# Patient Record
Sex: FEMALE | Race: WHITE | Employment: OTHER | URBAN - METROPOLITAN AREA
[De-identification: names, ages, dates, MRNs, and addresses within clinical notes are randomized per-mention and may not be internally consistent; named-entity substitution may affect disease eponyms.]

---

## 2019-09-17 ENCOUNTER — HOSPITAL ENCOUNTER (OUTPATIENT)
Dept: WOUND CARE | Age: 84
Discharge: HOME | End: 2019-09-17
Payer: MEDICARE

## 2019-09-17 VITALS
WEIGHT: 210 LBS | HEART RATE: 60 BPM | BODY MASS INDEX: 48.6 KG/M2 | HEIGHT: 55 IN | DIASTOLIC BLOOD PRESSURE: 68 MMHG | SYSTOLIC BLOOD PRESSURE: 104 MMHG | TEMPERATURE: 97.8 F | RESPIRATION RATE: 18 BRPM

## 2019-09-17 PROCEDURE — 87070 CULTURE OTHR SPECIMN AEROBIC: CPT

## 2019-09-17 PROCEDURE — 87186 SC STD MICRODIL/AGAR DIL: CPT

## 2019-09-17 PROCEDURE — 11042 DBRDMT SUBQ TIS 1ST 20SQCM/<: CPT

## 2019-09-17 PROCEDURE — 11042 DBRDMT SUBQ TIS 1ST 20SQCM/<: CPT | Performed by: FAMILY MEDICINE

## 2019-09-17 PROCEDURE — 99213 OFFICE O/P EST LOW 20 MIN: CPT

## 2019-09-17 PROCEDURE — 99203 OFFICE O/P NEW LOW 30 MIN: CPT | Performed by: FAMILY MEDICINE

## 2019-09-17 PROCEDURE — 87075 CULTR BACTERIA EXCEPT BLOOD: CPT

## 2019-09-17 PROCEDURE — 87077 CULTURE AEROBIC IDENTIFY: CPT

## 2019-09-17 RX ORDER — LIDOCAINE HYDROCHLORIDE 20 MG/ML
JELLY TOPICAL ONCE
Status: DISCONTINUED | OUTPATIENT
Start: 2019-09-17 | End: 2019-09-18 | Stop reason: HOSPADM

## 2019-09-17 RX ORDER — MAGNESIUM OXIDE 400 MG/1
400 TABLET ORAL DAILY
COMMUNITY

## 2019-09-17 RX ORDER — ALLOPURINOL 100 MG/1
100 TABLET ORAL DAILY
COMMUNITY

## 2019-09-17 RX ORDER — TAMSULOSIN HYDROCHLORIDE 0.4 MG/1
0.4 CAPSULE ORAL DAILY
COMMUNITY
End: 2019-10-22

## 2019-09-17 RX ORDER — WARFARIN SODIUM 5 MG/1
5 TABLET ORAL
Status: ON HOLD | COMMUNITY
End: 2022-02-24 | Stop reason: HOSPADM

## 2019-09-17 RX ORDER — WARFARIN SODIUM 2.5 MG/1
2.5 TABLET ORAL
Status: ON HOLD | COMMUNITY
End: 2022-02-24 | Stop reason: HOSPADM

## 2019-09-17 RX ORDER — FERROUS SULFATE 325(65) MG
325 TABLET ORAL
COMMUNITY

## 2019-09-17 RX ORDER — SPIRONOLACTONE 25 MG/1
25 TABLET ORAL 2 TIMES DAILY
Status: ON HOLD | COMMUNITY
End: 2019-10-26 | Stop reason: HOSPADM

## 2019-09-17 NOTE — PROGRESS NOTES
Wound care supplies ordered for patient from [facility]
shown are from today's visit. Procedure: Excisional Debridement: Wound # 1. At 2.55 cm sq. The patient was placed in the supine position. Lidocaine  gauze was applied  at beginning of wound evaluation. An  Excisional Debridement was performed. Using a curette ,  the wound was debrided sharply of all fibrotic, necrotic, and hyperkeratotic tissue, including a layer of surrounding healthy tissue to stimulate epithelization. The wound was excised through the level of the subcutaneous Wound Percentage debrided is 100%   Wound was irrigated with normal saline solution. Bleeding was with a moderate amount of bleeding, and controlled with pressure . Patient tolerated procedure well and was given proper instruction. 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 H&P, General medical evaluation for the purpose of Comprehensive wound    management for maximum healing and minimal morbidity. 2.   Excisional Debridement. Collagen, duoderm  3. We had a lengthy discussion about the diagnosis and aspects  to consider.              Sudhir Wong D.O.                   9/17/2019    1:55 PM

## 2019-09-19 LAB — ANAEROBIC CULTURE: NORMAL

## 2019-09-21 LAB
ORGANISM: ABNORMAL
ORGANISM: ABNORMAL
WOUND/ABSCESS: ABNORMAL
WOUND/ABSCESS: ABNORMAL

## 2019-09-24 ENCOUNTER — HOSPITAL ENCOUNTER (OUTPATIENT)
Dept: WOUND CARE | Age: 84
Discharge: HOME | End: 2019-09-24
Payer: MEDICARE

## 2019-10-01 ENCOUNTER — HOSPITAL ENCOUNTER (OUTPATIENT)
Dept: WOUND CARE | Age: 84
Discharge: HOME | End: 2019-10-01
Payer: MEDICARE

## 2019-10-01 VITALS
WEIGHT: 210 LBS | SYSTOLIC BLOOD PRESSURE: 92 MMHG | HEIGHT: 55 IN | DIASTOLIC BLOOD PRESSURE: 60 MMHG | RESPIRATION RATE: 18 BRPM | HEART RATE: 72 BPM | BODY MASS INDEX: 48.6 KG/M2 | TEMPERATURE: 98.2 F

## 2019-10-01 PROCEDURE — 87147 CULTURE TYPE IMMUNOLOGIC: CPT

## 2019-10-01 PROCEDURE — 87186 SC STD MICRODIL/AGAR DIL: CPT

## 2019-10-01 PROCEDURE — 87077 CULTURE AEROBIC IDENTIFY: CPT

## 2019-10-01 PROCEDURE — 87070 CULTURE OTHR SPECIMN AEROBIC: CPT

## 2019-10-01 PROCEDURE — 11042 DBRDMT SUBQ TIS 1ST 20SQCM/<: CPT

## 2019-10-01 PROCEDURE — 11042 DBRDMT SUBQ TIS 1ST 20SQCM/<: CPT | Performed by: FAMILY MEDICINE

## 2019-10-01 PROCEDURE — 87075 CULTR BACTERIA EXCEPT BLOOD: CPT

## 2019-10-01 RX ORDER — LIDOCAINE HYDROCHLORIDE 20 MG/ML
JELLY TOPICAL ONCE
Status: DISCONTINUED | OUTPATIENT
Start: 2019-10-01 | End: 2019-10-02 | Stop reason: HOSPADM

## 2019-10-03 LAB — ANAEROBIC CULTURE: NORMAL

## 2019-10-04 LAB
ORGANISM: ABNORMAL
WOUND/ABSCESS: ABNORMAL

## 2019-10-08 ENCOUNTER — HOSPITAL ENCOUNTER (OUTPATIENT)
Dept: WOUND CARE | Age: 84
Discharge: HOME | End: 2019-10-08
Payer: MEDICARE

## 2019-10-08 VITALS
RESPIRATION RATE: 18 BRPM | WEIGHT: 210 LBS | HEIGHT: 55 IN | HEART RATE: 76 BPM | BODY MASS INDEX: 48.6 KG/M2 | DIASTOLIC BLOOD PRESSURE: 70 MMHG | SYSTOLIC BLOOD PRESSURE: 92 MMHG | TEMPERATURE: 97.7 F

## 2019-10-08 DIAGNOSIS — L97.112 LOWER LIMB ULCER, THIGH, RIGHT, WITH FAT LAYER EXPOSED (HCC): Primary | ICD-10-CM

## 2019-10-08 PROCEDURE — 11042 DBRDMT SUBQ TIS 1ST 20SQCM/<: CPT

## 2019-10-08 PROCEDURE — 11042 DBRDMT SUBQ TIS 1ST 20SQCM/<: CPT | Performed by: FAMILY MEDICINE

## 2019-10-08 RX ORDER — GENTAMICIN SULFATE 1 MG/G
OINTMENT TOPICAL DAILY
COMMUNITY
End: 2020-02-24 | Stop reason: ALTCHOICE

## 2019-10-08 RX ORDER — LIDOCAINE HYDROCHLORIDE 20 MG/ML
JELLY TOPICAL ONCE
Status: DISCONTINUED | OUTPATIENT
Start: 2019-10-08 | End: 2019-10-09 | Stop reason: HOSPADM

## 2019-10-08 RX ORDER — GENTAMICIN SULFATE 1 MG/G
OINTMENT TOPICAL
Qty: 30 G | Refills: 1 | Status: SHIPPED | OUTPATIENT
Start: 2019-10-08 | End: 2019-10-15

## 2019-10-08 RX ORDER — SULFAMETHOXAZOLE AND TRIMETHOPRIM 800; 160 MG/1; MG/1
1 TABLET ORAL 2 TIMES DAILY
COMMUNITY
End: 2019-10-15 | Stop reason: ALTCHOICE

## 2019-10-08 RX ORDER — SULFAMETHOXAZOLE AND TRIMETHOPRIM 800; 160 MG/1; MG/1
1 TABLET ORAL 2 TIMES DAILY
Qty: 14 TABLET | Refills: 1 | Status: SHIPPED | OUTPATIENT
Start: 2019-10-08 | End: 2019-10-15 | Stop reason: ALTCHOICE

## 2019-10-08 ASSESSMENT — PAIN DESCRIPTION - ONSET: ONSET: ON-GOING

## 2019-10-08 ASSESSMENT — PAIN DESCRIPTION - LOCATION: LOCATION: LEG

## 2019-10-08 ASSESSMENT — PAIN DESCRIPTION - DESCRIPTORS: DESCRIPTORS: BURNING

## 2019-10-08 ASSESSMENT — PAIN DESCRIPTION - PAIN TYPE: TYPE: ACUTE PAIN

## 2019-10-08 ASSESSMENT — PAIN - FUNCTIONAL ASSESSMENT: PAIN_FUNCTIONAL_ASSESSMENT: PREVENTS OR INTERFERES SOME ACTIVE ACTIVITIES AND ADLS

## 2019-10-08 ASSESSMENT — PAIN DESCRIPTION - FREQUENCY: FREQUENCY: INTERMITTENT

## 2019-10-08 ASSESSMENT — PAIN DESCRIPTION - ORIENTATION: ORIENTATION: RIGHT

## 2019-10-08 ASSESSMENT — PAIN SCALES - GENERAL: PAINLEVEL_OUTOF10: 2

## 2019-10-15 ENCOUNTER — HOSPITAL ENCOUNTER (OUTPATIENT)
Dept: WOUND CARE | Age: 84
Discharge: HOME | End: 2019-10-15
Payer: MEDICARE

## 2019-10-15 VITALS
BODY MASS INDEX: 55.15 KG/M2 | TEMPERATURE: 97.8 F | DIASTOLIC BLOOD PRESSURE: 62 MMHG | HEART RATE: 88 BPM | RESPIRATION RATE: 20 BRPM | SYSTOLIC BLOOD PRESSURE: 104 MMHG | WEIGHT: 233 LBS

## 2019-10-15 PROCEDURE — 11042 DBRDMT SUBQ TIS 1ST 20SQCM/<: CPT | Performed by: FAMILY MEDICINE

## 2019-10-15 PROCEDURE — 11042 DBRDMT SUBQ TIS 1ST 20SQCM/<: CPT

## 2019-10-15 RX ORDER — LIDOCAINE HYDROCHLORIDE 20 MG/ML
JELLY TOPICAL ONCE
Status: DISCONTINUED | OUTPATIENT
Start: 2019-10-15 | End: 2019-10-16 | Stop reason: HOSPADM

## 2019-10-15 ASSESSMENT — PAIN SCALES - GENERAL: PAINLEVEL_OUTOF10: 0

## 2019-10-22 ENCOUNTER — HOSPITAL ENCOUNTER (OUTPATIENT)
Dept: WOUND CARE | Age: 84
Discharge: HOME | DRG: 623 | End: 2019-10-22
Payer: MEDICARE

## 2019-10-22 ENCOUNTER — HOSPITAL ENCOUNTER (INPATIENT)
Age: 84
LOS: 5 days | Discharge: HOME | DRG: 623 | End: 2019-10-27
Attending: EMERGENCY MEDICINE | Admitting: INTERNAL MEDICINE
Payer: MEDICARE

## 2019-10-22 VITALS
SYSTOLIC BLOOD PRESSURE: 112 MMHG | WEIGHT: 227 LBS | TEMPERATURE: 97.9 F | BODY MASS INDEX: 53.73 KG/M2 | DIASTOLIC BLOOD PRESSURE: 62 MMHG | HEART RATE: 64 BPM | RESPIRATION RATE: 20 BRPM

## 2019-10-22 DIAGNOSIS — L97.112 SKIN ULCER OF RIGHT THIGH WITH FAT LAYER EXPOSED (HCC): Primary | ICD-10-CM

## 2019-10-22 DIAGNOSIS — N17.9 AKI (ACUTE KIDNEY INJURY) (HCC): ICD-10-CM

## 2019-10-22 DIAGNOSIS — E87.5 HYPERKALEMIA: Primary | ICD-10-CM

## 2019-10-22 LAB
ALBUMIN SERPL-MCNC: 4.1 G/DL (ref 3.5–5.2)
ALP BLD-CCNC: 39 U/L (ref 35–104)
ALT SERPL-CCNC: 13 U/L (ref 0–32)
ANION GAP SERPL CALCULATED.3IONS-SCNC: 10 MMOL/L (ref 7–16)
ANION GAP SERPL CALCULATED.3IONS-SCNC: 9 MMOL/L (ref 7–16)
AST SERPL-CCNC: 31 U/L (ref 0–31)
BASOPHILS ABSOLUTE: 0.08 E9/L (ref 0–0.2)
BASOPHILS RELATIVE PERCENT: 1.2 % (ref 0–2)
BILIRUB SERPL-MCNC: 0.4 MG/DL (ref 0–1.2)
BUN BLDV-MCNC: 41 MG/DL (ref 8–23)
BUN BLDV-MCNC: 41 MG/DL (ref 8–23)
CALCIUM SERPL-MCNC: 9.3 MG/DL (ref 8.6–10.2)
CALCIUM SERPL-MCNC: 9.7 MG/DL (ref 8.6–10.2)
CHLORIDE BLD-SCNC: 114 MMOL/L (ref 98–107)
CHLORIDE BLD-SCNC: 114 MMOL/L (ref 98–107)
CO2: 11 MMOL/L (ref 22–29)
CO2: 13 MMOL/L (ref 22–29)
CO2: 18 MMOL/L (ref 22–29)
CO2: 19 MMOL/L (ref 22–29)
CREAT SERPL-MCNC: 1.5 MG/DL (ref 0.5–1)
CREAT SERPL-MCNC: 1.6 MG/DL (ref 0.5–1)
EOSINOPHILS ABSOLUTE: 0.18 E9/L (ref 0.05–0.5)
EOSINOPHILS RELATIVE PERCENT: 2.7 % (ref 0–6)
GFR AFRICAN AMERICAN: 35
GFR AFRICAN AMERICAN: 37
GFR AFRICAN AMERICAN: 37
GFR AFRICAN AMERICAN: 40
GFR NON-AFRICAN AMERICAN: 29 ML/MIN/1.73
GFR NON-AFRICAN AMERICAN: 31 ML/MIN/1.73
GFR NON-AFRICAN AMERICAN: 31 ML/MIN/1.73
GFR NON-AFRICAN AMERICAN: 33 ML/MIN/1.73
GLUCOSE BLD-MCNC: 75 MG/DL (ref 74–99)
GLUCOSE BLD-MCNC: 79 MG/DL (ref 74–99)
GLUCOSE BLD-MCNC: 79 MG/DL (ref 74–99)
GLUCOSE BLD-MCNC: 96 MG/DL (ref 74–99)
HCT VFR BLD CALC: 36.9 % (ref 34–48)
HEMOGLOBIN: 11.1 G/DL (ref 11.5–15.5)
IMMATURE GRANULOCYTES #: 0.02 E9/L
IMMATURE GRANULOCYTES %: 0.3 % (ref 0–5)
INR BLD: 1.4
LYMPHOCYTES ABSOLUTE: 1.46 E9/L (ref 1.5–4)
LYMPHOCYTES RELATIVE PERCENT: 22.1 % (ref 20–42)
MCH RBC QN AUTO: 31.3 PG (ref 26–35)
MCHC RBC AUTO-ENTMCNC: 30.1 % (ref 32–34.5)
MCV RBC AUTO: 103.9 FL (ref 80–99.9)
MONOCYTES ABSOLUTE: 0.44 E9/L (ref 0.1–0.95)
MONOCYTES RELATIVE PERCENT: 6.6 % (ref 2–12)
NEUTROPHILS ABSOLUTE: 4.44 E9/L (ref 1.8–7.3)
NEUTROPHILS RELATIVE PERCENT: 67.1 % (ref 43–80)
PDW BLD-RTO: 15.7 FL (ref 11.5–15)
PLATELET # BLD: 255 E9/L (ref 130–450)
PMV BLD AUTO: 10.5 FL (ref 7–12)
POC ANION GAP: 1 MMOL/L (ref 7–16)
POC ANION GAP: 7 MMOL/L (ref 7–16)
POC BUN: 52 MG/DL (ref 8–23)
POC BUN: 62 MG/DL (ref 8–23)
POC CHLORIDE: 115 MMOL/L (ref 100–108)
POC CHLORIDE: 118 MMOL/L (ref 100–108)
POC CREATININE: 1.6 MG/DL (ref 0.5–1)
POC CREATININE: 1.7 MG/DL (ref 0.5–1)
POC POTASSIUM: 6.3 MMOL/L (ref 3.5–5)
POC POTASSIUM: 8.9 MMOL/L (ref 3.5–5)
POC SODIUM: 137 MMOL/L (ref 132–146)
POC SODIUM: 141 MMOL/L (ref 132–146)
POTASSIUM REFLEX MAGNESIUM: 7.9 MMOL/L (ref 3.5–5)
POTASSIUM SERPL-SCNC: 7.3 MMOL/L (ref 3.5–5)
PROTHROMBIN TIME: 15.2 SEC (ref 9.3–12.4)
RBC # BLD: 3.55 E12/L (ref 3.5–5.5)
SODIUM BLD-SCNC: 134 MMOL/L (ref 132–146)
SODIUM BLD-SCNC: 137 MMOL/L (ref 132–146)
TOTAL PROTEIN: 7.6 G/DL (ref 6.4–8.3)
WBC # BLD: 6.6 E9/L (ref 4.5–11.5)

## 2019-10-22 PROCEDURE — 2580000003 HC RX 258: Performed by: INTERNAL MEDICINE

## 2019-10-22 PROCEDURE — 2580000003 HC RX 258: Performed by: EMERGENCY MEDICINE

## 2019-10-22 PROCEDURE — 82565 ASSAY OF CREATININE: CPT

## 2019-10-22 PROCEDURE — 11042 DBRDMT SUBQ TIS 1ST 20SQCM/<: CPT | Performed by: FAMILY MEDICINE

## 2019-10-22 PROCEDURE — 2060000000 HC ICU INTERMEDIATE R&B

## 2019-10-22 PROCEDURE — 96374 THER/PROPH/DIAG INJ IV PUSH: CPT

## 2019-10-22 PROCEDURE — 96375 TX/PRO/DX INJ NEW DRUG ADDON: CPT

## 2019-10-22 PROCEDURE — 6360000002 HC RX W HCPCS: Performed by: EMERGENCY MEDICINE

## 2019-10-22 PROCEDURE — 11042 DBRDMT SUBQ TIS 1ST 20SQCM/<: CPT

## 2019-10-22 PROCEDURE — 2500000003 HC RX 250 WO HCPCS: Performed by: EMERGENCY MEDICINE

## 2019-10-22 PROCEDURE — 82947 ASSAY GLUCOSE BLOOD QUANT: CPT

## 2019-10-22 PROCEDURE — 36415 COLL VENOUS BLD VENIPUNCTURE: CPT

## 2019-10-22 PROCEDURE — 99285 EMERGENCY DEPT VISIT HI MDM: CPT

## 2019-10-22 PROCEDURE — 80051 ELECTROLYTE PANEL: CPT

## 2019-10-22 PROCEDURE — 80048 BASIC METABOLIC PNL TOTAL CA: CPT

## 2019-10-22 PROCEDURE — 93005 ELECTROCARDIOGRAM TRACING: CPT | Performed by: EMERGENCY MEDICINE

## 2019-10-22 PROCEDURE — 85610 PROTHROMBIN TIME: CPT

## 2019-10-22 PROCEDURE — 84520 ASSAY OF UREA NITROGEN: CPT

## 2019-10-22 PROCEDURE — 6370000000 HC RX 637 (ALT 250 FOR IP): Performed by: INTERNAL MEDICINE

## 2019-10-22 PROCEDURE — 85025 COMPLETE CBC W/AUTO DIFF WBC: CPT

## 2019-10-22 PROCEDURE — 6370000000 HC RX 637 (ALT 250 FOR IP): Performed by: EMERGENCY MEDICINE

## 2019-10-22 PROCEDURE — 80053 COMPREHEN METABOLIC PANEL: CPT

## 2019-10-22 RX ORDER — SULFAMETHOXAZOLE AND TRIMETHOPRIM 800; 160 MG/1; MG/1
1 TABLET ORAL 2 TIMES DAILY
Qty: 20 TABLET | Refills: 0 | Status: ON HOLD | OUTPATIENT
Start: 2019-10-22 | End: 2019-10-26 | Stop reason: HOSPADM

## 2019-10-22 RX ORDER — SODIUM CHLORIDE 9 MG/ML
INJECTION, SOLUTION INTRAVENOUS CONTINUOUS
Status: DISCONTINUED | OUTPATIENT
Start: 2019-10-22 | End: 2019-10-23

## 2019-10-22 RX ORDER — LIDOCAINE HYDROCHLORIDE 20 MG/ML
JELLY TOPICAL ONCE
Status: DISCONTINUED | OUTPATIENT
Start: 2019-10-22 | End: 2019-10-23 | Stop reason: HOSPADM

## 2019-10-22 RX ORDER — DEXTROSE MONOHYDRATE 25 G/50ML
25 INJECTION, SOLUTION INTRAVENOUS ONCE
Status: COMPLETED | OUTPATIENT
Start: 2019-10-22 | End: 2019-10-22

## 2019-10-22 RX ORDER — DEXTROSE MONOHYDRATE 50 MG/ML
100 INJECTION, SOLUTION INTRAVENOUS PRN
Status: DISCONTINUED | OUTPATIENT
Start: 2019-10-22 | End: 2019-10-27 | Stop reason: HOSPADM

## 2019-10-22 RX ORDER — SULFAMETHOXAZOLE AND TRIMETHOPRIM 800; 160 MG/1; MG/1
1 TABLET ORAL 2 TIMES DAILY
Status: ON HOLD | COMMUNITY
End: 2019-10-26 | Stop reason: HOSPADM

## 2019-10-22 RX ORDER — ACETAMINOPHEN 500 MG
1000 TABLET ORAL ONCE
Status: COMPLETED | OUTPATIENT
Start: 2019-10-22 | End: 2019-10-22

## 2019-10-22 RX ORDER — LISINOPRIL 10 MG/1
10 TABLET ORAL DAILY
Status: ON HOLD | COMMUNITY
End: 2019-10-26 | Stop reason: HOSPADM

## 2019-10-22 RX ORDER — SODIUM CHLORIDE 0.9 % (FLUSH) 0.9 %
10 SYRINGE (ML) INJECTION PRN
Status: DISCONTINUED | OUTPATIENT
Start: 2019-10-22 | End: 2019-10-27 | Stop reason: HOSPADM

## 2019-10-22 RX ORDER — WARFARIN SODIUM 5 MG/1
5 TABLET ORAL
Status: DISCONTINUED | OUTPATIENT
Start: 2019-10-26 | End: 2019-10-23

## 2019-10-22 RX ORDER — FERROUS SULFATE 325(65) MG
325 TABLET ORAL
Status: DISCONTINUED | OUTPATIENT
Start: 2019-10-23 | End: 2019-10-27 | Stop reason: HOSPADM

## 2019-10-22 RX ORDER — DEXTROSE MONOHYDRATE 25 G/50ML
12.5 INJECTION, SOLUTION INTRAVENOUS PRN
Status: DISCONTINUED | OUTPATIENT
Start: 2019-10-22 | End: 2019-10-27 | Stop reason: HOSPADM

## 2019-10-22 RX ORDER — SODIUM CHLORIDE 0.9 % (FLUSH) 0.9 %
10 SYRINGE (ML) INJECTION EVERY 12 HOURS SCHEDULED
Status: DISCONTINUED | OUTPATIENT
Start: 2019-10-22 | End: 2019-10-27 | Stop reason: HOSPADM

## 2019-10-22 RX ORDER — WARFARIN SODIUM 2.5 MG/1
2.5 TABLET ORAL
Status: DISCONTINUED | OUTPATIENT
Start: 2019-10-22 | End: 2019-10-23

## 2019-10-22 RX ORDER — FUROSEMIDE 10 MG/ML
40 INJECTION INTRAMUSCULAR; INTRAVENOUS ONCE
Status: COMPLETED | OUTPATIENT
Start: 2019-10-22 | End: 2019-10-22

## 2019-10-22 RX ORDER — 0.9 % SODIUM CHLORIDE 0.9 %
1000 INTRAVENOUS SOLUTION INTRAVENOUS ONCE
Status: COMPLETED | OUTPATIENT
Start: 2019-10-22 | End: 2019-10-22

## 2019-10-22 RX ORDER — GENTAMICIN SULFATE 1 MG/G
OINTMENT TOPICAL DAILY
Status: DISCONTINUED | OUTPATIENT
Start: 2019-10-23 | End: 2019-10-27 | Stop reason: HOSPADM

## 2019-10-22 RX ORDER — PANTOPRAZOLE SODIUM 40 MG/1
40 TABLET, DELAYED RELEASE ORAL
Status: DISCONTINUED | OUTPATIENT
Start: 2019-10-23 | End: 2019-10-27 | Stop reason: HOSPADM

## 2019-10-22 RX ORDER — NICOTINE POLACRILEX 4 MG
15 LOZENGE BUCCAL PRN
Status: DISCONTINUED | OUTPATIENT
Start: 2019-10-22 | End: 2019-10-27 | Stop reason: HOSPADM

## 2019-10-22 RX ORDER — ACETAMINOPHEN 325 MG/1
650 TABLET ORAL EVERY 4 HOURS PRN
Status: DISCONTINUED | OUTPATIENT
Start: 2019-10-22 | End: 2019-10-27 | Stop reason: HOSPADM

## 2019-10-22 RX ORDER — SODIUM POLYSTYRENE SULFONATE 15 G/60ML
30 SUSPENSION ORAL; RECTAL ONCE
Status: COMPLETED | OUTPATIENT
Start: 2019-10-22 | End: 2019-10-22

## 2019-10-22 RX ADMIN — Medication 10 ML: at 22:35

## 2019-10-22 RX ADMIN — ACETAMINOPHEN 1000 MG: 500 TABLET, FILM COATED ORAL at 18:53

## 2019-10-22 RX ADMIN — WARFARIN SODIUM 2.5 MG: 2.5 TABLET ORAL at 23:23

## 2019-10-22 RX ADMIN — INSULIN HUMAN 10 UNITS: 100 INJECTION, SOLUTION PARENTERAL at 20:19

## 2019-10-22 RX ADMIN — SODIUM POLYSTYRENE SULFONATE 30 G: 15 SUSPENSION ORAL; RECTAL at 19:54

## 2019-10-22 RX ADMIN — SODIUM BICARBONATE 50 MEQ: 84 INJECTION INTRAVENOUS at 20:26

## 2019-10-22 RX ADMIN — FUROSEMIDE 40 MG: 10 INJECTION, SOLUTION INTRAMUSCULAR; INTRAVENOUS at 20:21

## 2019-10-22 RX ADMIN — SODIUM CHLORIDE 1000 ML: 9 INJECTION, SOLUTION INTRAVENOUS at 20:38

## 2019-10-22 RX ADMIN — CALCIUM GLUCONATE 1 G: 98 INJECTION, SOLUTION INTRAVENOUS at 20:32

## 2019-10-22 RX ADMIN — DEXTROSE MONOHYDRATE 25 G: 500 INJECTION PARENTERAL at 20:13

## 2019-10-22 RX ADMIN — SODIUM CHLORIDE: 9 INJECTION, SOLUTION INTRAVENOUS at 22:34

## 2019-10-22 ASSESSMENT — ENCOUNTER SYMPTOMS
DIARRHEA: 1
VOMITING: 0
NAUSEA: 0
CHEST TIGHTNESS: 0
SHORTNESS OF BREATH: 0
ABDOMINAL PAIN: 0
BACK PAIN: 0

## 2019-10-22 ASSESSMENT — PAIN DESCRIPTION - PAIN TYPE
TYPE: CHRONIC PAIN
TYPE: ACUTE PAIN

## 2019-10-22 ASSESSMENT — PAIN DESCRIPTION - LOCATION
LOCATION: LEG
LOCATION: LEG

## 2019-10-22 ASSESSMENT — PAIN DESCRIPTION - ORIENTATION
ORIENTATION: RIGHT
ORIENTATION: RIGHT

## 2019-10-22 ASSESSMENT — PAIN DESCRIPTION - FREQUENCY: FREQUENCY: INTERMITTENT

## 2019-10-22 ASSESSMENT — PAIN SCALES - GENERAL
PAINLEVEL_OUTOF10: 8
PAINLEVEL_OUTOF10: 10
PAINLEVEL_OUTOF10: 4

## 2019-10-22 ASSESSMENT — PAIN DESCRIPTION - ONSET: ONSET: ON-GOING

## 2019-10-22 ASSESSMENT — PAIN DESCRIPTION - DESCRIPTORS
DESCRIPTORS: BURNING
DESCRIPTORS: SHARP

## 2019-10-23 ENCOUNTER — APPOINTMENT (OUTPATIENT)
Dept: ULTRASOUND IMAGING | Age: 84
DRG: 623 | End: 2019-10-23
Payer: MEDICARE

## 2019-10-23 LAB
ANION GAP SERPL CALCULATED.3IONS-SCNC: 10 MMOL/L (ref 7–16)
ANION GAP SERPL CALCULATED.3IONS-SCNC: 10 MMOL/L (ref 7–16)
ANION GAP SERPL CALCULATED.3IONS-SCNC: 12 MMOL/L (ref 7–16)
BACTERIA: NORMAL /HPF
BILIRUBIN URINE: NEGATIVE
BLOOD, URINE: NEGATIVE
BUN BLDV-MCNC: 40 MG/DL (ref 8–23)
BUN BLDV-MCNC: 40 MG/DL (ref 8–23)
BUN BLDV-MCNC: 41 MG/DL (ref 8–23)
CALCIUM SERPL-MCNC: 9.2 MG/DL (ref 8.6–10.2)
CALCIUM SERPL-MCNC: 9.5 MG/DL (ref 8.6–10.2)
CALCIUM SERPL-MCNC: 9.5 MG/DL (ref 8.6–10.2)
CHLORIDE BLD-SCNC: 110 MMOL/L (ref 98–107)
CHLORIDE BLD-SCNC: 113 MMOL/L (ref 98–107)
CHLORIDE BLD-SCNC: 115 MMOL/L (ref 98–107)
CLARITY: CLEAR
CO2: 13 MMOL/L (ref 22–29)
CO2: 14 MMOL/L (ref 22–29)
CO2: 15 MMOL/L (ref 22–29)
COLOR: YELLOW
CREAT SERPL-MCNC: 1.4 MG/DL (ref 0.5–1)
CREAT SERPL-MCNC: 1.5 MG/DL (ref 0.5–1)
CREAT SERPL-MCNC: 1.5 MG/DL (ref 0.5–1)
EKG ATRIAL RATE: 94 BPM
EKG Q-T INTERVAL: 350 MS
EKG QRS DURATION: 90 MS
EKG QTC CALCULATION (BAZETT): 411 MS
EKG R AXIS: 28 DEGREES
EKG T AXIS: 12 DEGREES
EKG VENTRICULAR RATE: 83 BPM
FOLATE: 6.8 NG/ML (ref 4.8–24.2)
GFR AFRICAN AMERICAN: 40
GFR AFRICAN AMERICAN: 40
GFR AFRICAN AMERICAN: 43
GFR NON-AFRICAN AMERICAN: 33 ML/MIN/1.73
GFR NON-AFRICAN AMERICAN: 33 ML/MIN/1.73
GFR NON-AFRICAN AMERICAN: 36 ML/MIN/1.73
GLUCOSE BLD-MCNC: 80 MG/DL (ref 74–99)
GLUCOSE BLD-MCNC: 81 MG/DL (ref 74–99)
GLUCOSE BLD-MCNC: 92 MG/DL (ref 74–99)
GLUCOSE URINE: NEGATIVE MG/DL
HBA1C MFR BLD: 5.1 % (ref 4–5.6)
HCT VFR BLD CALC: 34.2 % (ref 34–48)
HEMOGLOBIN: 10.2 G/DL (ref 11.5–15.5)
INR BLD: 1.3
KETONES, URINE: NEGATIVE MG/DL
LACTIC ACID: 0.9 MMOL/L (ref 0.5–2.2)
LEUKOCYTE ESTERASE, URINE: NEGATIVE
MAGNESIUM: 1.6 MG/DL (ref 1.6–2.6)
MCH RBC QN AUTO: 30.9 PG (ref 26–35)
MCHC RBC AUTO-ENTMCNC: 29.8 % (ref 32–34.5)
MCV RBC AUTO: 103.6 FL (ref 80–99.9)
NITRITE, URINE: NEGATIVE
PDW BLD-RTO: 15.9 FL (ref 11.5–15)
PH UA: 5 (ref 5–9)
PHOSPHORUS: 3.4 MG/DL (ref 2.5–4.5)
PLATELET # BLD: 260 E9/L (ref 130–450)
PMV BLD AUTO: 10.6 FL (ref 7–12)
POTASSIUM SERPL-SCNC: 5.6 MMOL/L (ref 3.5–5)
POTASSIUM SERPL-SCNC: 5.9 MMOL/L (ref 3.5–5)
POTASSIUM SERPL-SCNC: 6.2 MMOL/L (ref 3.5–5)
PROTEIN UA: NEGATIVE MG/DL
PROTHROMBIN TIME: 15 SEC (ref 9.3–12.4)
RBC # BLD: 3.3 E12/L (ref 3.5–5.5)
RBC UA: NORMAL /HPF (ref 0–2)
SODIUM BLD-SCNC: 137 MMOL/L (ref 132–146)
SODIUM BLD-SCNC: 137 MMOL/L (ref 132–146)
SODIUM BLD-SCNC: 138 MMOL/L (ref 132–146)
SPECIFIC GRAVITY UA: 1.01 (ref 1–1.03)
TSH SERPL DL<=0.05 MIU/L-ACNC: 1.96 UIU/ML (ref 0.27–4.2)
UROBILINOGEN, URINE: 0.2 E.U./DL
VITAMIN B-12: 450 PG/ML (ref 211–946)
WBC # BLD: 6 E9/L (ref 4.5–11.5)
WBC UA: NORMAL /HPF (ref 0–5)

## 2019-10-23 PROCEDURE — 6370000000 HC RX 637 (ALT 250 FOR IP): Performed by: INTERNAL MEDICINE

## 2019-10-23 PROCEDURE — 83735 ASSAY OF MAGNESIUM: CPT

## 2019-10-23 PROCEDURE — 99222 1ST HOSP IP/OBS MODERATE 55: CPT | Performed by: SURGERY

## 2019-10-23 PROCEDURE — 83036 HEMOGLOBIN GLYCOSYLATED A1C: CPT

## 2019-10-23 PROCEDURE — 80048 BASIC METABOLIC PNL TOTAL CA: CPT

## 2019-10-23 PROCEDURE — 2580000003 HC RX 258: Performed by: INTERNAL MEDICINE

## 2019-10-23 PROCEDURE — 85027 COMPLETE CBC AUTOMATED: CPT

## 2019-10-23 PROCEDURE — 81001 URINALYSIS AUTO W/SCOPE: CPT

## 2019-10-23 PROCEDURE — 2500000003 HC RX 250 WO HCPCS: Performed by: INTERNAL MEDICINE

## 2019-10-23 PROCEDURE — 36415 COLL VENOUS BLD VENIPUNCTURE: CPT

## 2019-10-23 PROCEDURE — 84100 ASSAY OF PHOSPHORUS: CPT

## 2019-10-23 PROCEDURE — 84443 ASSAY THYROID STIM HORMONE: CPT

## 2019-10-23 PROCEDURE — 82746 ASSAY OF FOLIC ACID SERUM: CPT

## 2019-10-23 PROCEDURE — 2580000003 HC RX 258: Performed by: CLINICAL NURSE SPECIALIST

## 2019-10-23 PROCEDURE — 85610 PROTHROMBIN TIME: CPT

## 2019-10-23 PROCEDURE — 87070 CULTURE OTHR SPECIMN AEROBIC: CPT

## 2019-10-23 PROCEDURE — 93010 ELECTROCARDIOGRAM REPORT: CPT | Performed by: INTERNAL MEDICINE

## 2019-10-23 PROCEDURE — 2060000000 HC ICU INTERMEDIATE R&B

## 2019-10-23 PROCEDURE — 6360000002 HC RX W HCPCS: Performed by: CLINICAL NURSE SPECIALIST

## 2019-10-23 PROCEDURE — 82607 VITAMIN B-12: CPT

## 2019-10-23 PROCEDURE — 83605 ASSAY OF LACTIC ACID: CPT

## 2019-10-23 PROCEDURE — 76882 US LMTD JT/FCL EVL NVASC XTR: CPT

## 2019-10-23 RX ORDER — LEVOFLOXACIN 750 MG/1
750 TABLET ORAL EVERY OTHER DAY
Status: DISCONTINUED | OUTPATIENT
Start: 2019-10-23 | End: 2019-10-27 | Stop reason: HOSPADM

## 2019-10-23 RX ORDER — LEVOFLOXACIN 750 MG/1
TABLET ORAL
Status: DISPENSED
Start: 2019-10-23 | End: 2019-10-23

## 2019-10-23 RX ADMIN — GENTAMICIN SULFATE: 1 OINTMENT TOPICAL at 10:15

## 2019-10-23 RX ADMIN — FERROUS SULFATE TAB 325 MG (65 MG ELEMENTAL FE) 325 MG: 325 (65 FE) TAB at 11:03

## 2019-10-23 RX ADMIN — ACETAMINOPHEN 650 MG: 325 TABLET, FILM COATED ORAL at 08:36

## 2019-10-23 RX ADMIN — ACETAMINOPHEN 650 MG: 325 TABLET, FILM COATED ORAL at 23:16

## 2019-10-23 RX ADMIN — PANTOPRAZOLE SODIUM 40 MG: 40 TABLET, DELAYED RELEASE ORAL at 06:06

## 2019-10-23 RX ADMIN — LEVOFLOXACIN 750 MG: 750 TABLET, FILM COATED ORAL at 08:35

## 2019-10-23 RX ADMIN — SODIUM BICARBONATE: 84 INJECTION, SOLUTION INTRAVENOUS at 03:27

## 2019-10-23 RX ADMIN — METOPROLOL TARTRATE 25 MG: 25 TABLET ORAL at 20:28

## 2019-10-23 RX ADMIN — METOPROLOL TARTRATE 25 MG: 25 TABLET ORAL at 08:35

## 2019-10-23 RX ADMIN — VANCOMYCIN HYDROCHLORIDE 1250 MG: 10 INJECTION, POWDER, LYOPHILIZED, FOR SOLUTION INTRAVENOUS at 15:28

## 2019-10-23 ASSESSMENT — PAIN SCALES - GENERAL
PAINLEVEL_OUTOF10: 4
PAINLEVEL_OUTOF10: 8
PAINLEVEL_OUTOF10: 0
PAINLEVEL_OUTOF10: 0
PAINLEVEL_OUTOF10: 3
PAINLEVEL_OUTOF10: 2

## 2019-10-23 ASSESSMENT — PAIN DESCRIPTION - PROGRESSION: CLINICAL_PROGRESSION: GRADUALLY WORSENING

## 2019-10-23 ASSESSMENT — PAIN DESCRIPTION - PAIN TYPE
TYPE: ACUTE PAIN
TYPE: CHRONIC PAIN

## 2019-10-23 ASSESSMENT — PAIN DESCRIPTION - DESCRIPTORS: DESCRIPTORS: ACHING;DISCOMFORT;DULL

## 2019-10-23 ASSESSMENT — PAIN DESCRIPTION - ONSET: ONSET: ON-GOING

## 2019-10-23 ASSESSMENT — PAIN SCALES - WONG BAKER
WONGBAKER_NUMERICALRESPONSE: 0
WONGBAKER_NUMERICALRESPONSE: 0

## 2019-10-23 ASSESSMENT — PAIN - FUNCTIONAL ASSESSMENT: PAIN_FUNCTIONAL_ASSESSMENT: ACTIVITIES ARE NOT PREVENTED

## 2019-10-23 ASSESSMENT — PAIN DESCRIPTION - LOCATION
LOCATION: HEAD
LOCATION: GENERALIZED

## 2019-10-23 ASSESSMENT — PAIN DESCRIPTION - FREQUENCY: FREQUENCY: CONTINUOUS

## 2019-10-23 ASSESSMENT — PAIN DESCRIPTION - ORIENTATION: ORIENTATION: RIGHT;LEFT

## 2019-10-24 ENCOUNTER — ANESTHESIA (OUTPATIENT)
Dept: OPERATING ROOM | Age: 84
DRG: 623 | End: 2019-10-24
Payer: MEDICARE

## 2019-10-24 ENCOUNTER — ANESTHESIA EVENT (OUTPATIENT)
Dept: OPERATING ROOM | Age: 84
DRG: 623 | End: 2019-10-24
Payer: MEDICARE

## 2019-10-24 VITALS
RESPIRATION RATE: 15 BRPM | SYSTOLIC BLOOD PRESSURE: 80 MMHG | DIASTOLIC BLOOD PRESSURE: 51 MMHG | OXYGEN SATURATION: 98 %

## 2019-10-24 LAB
ANION GAP SERPL CALCULATED.3IONS-SCNC: 12 MMOL/L (ref 7–16)
BUN BLDV-MCNC: 36 MG/DL (ref 8–23)
CALCIUM SERPL-MCNC: 9.3 MG/DL (ref 8.6–10.2)
CHLORIDE BLD-SCNC: 107 MMOL/L (ref 98–107)
CHLORIDE URINE RANDOM: 62 MMOL/L
CO2: 17 MMOL/L (ref 22–29)
CREAT SERPL-MCNC: 1.5 MG/DL (ref 0.5–1)
CREATININE URINE: 173 MG/DL (ref 29–226)
GFR AFRICAN AMERICAN: 40
GFR NON-AFRICAN AMERICAN: 33 ML/MIN/1.73
GLUCOSE BLD-MCNC: 85 MG/DL (ref 74–99)
HCT VFR BLD CALC: 34.5 % (ref 34–48)
HEMOGLOBIN: 10.5 G/DL (ref 11.5–15.5)
INR BLD: 1.5
MCH RBC QN AUTO: 31.3 PG (ref 26–35)
MCHC RBC AUTO-ENTMCNC: 30.4 % (ref 32–34.5)
MCV RBC AUTO: 102.7 FL (ref 80–99.9)
OSMOLALITY URINE: 638 MOSM/KG (ref 300–900)
OSMOLALITY: 296 MOSM/KG (ref 285–310)
PDW BLD-RTO: 15.9 FL (ref 11.5–15)
PLATELET # BLD: 253 E9/L (ref 130–450)
PMV BLD AUTO: 10.7 FL (ref 7–12)
POTASSIUM SERPL-SCNC: 5.2 MMOL/L (ref 3.5–5)
POTASSIUM, UR: 43.8 MMOL/L
PROTHROMBIN TIME: 16.7 SEC (ref 9.3–12.4)
RBC # BLD: 3.36 E12/L (ref 3.5–5.5)
SODIUM BLD-SCNC: 136 MMOL/L (ref 132–146)
SODIUM URINE: 67 MMOL/L
VANCOMYCIN RANDOM: 7.9 MCG/ML (ref 5–40)
WBC # BLD: 7.4 E9/L (ref 4.5–11.5)

## 2019-10-24 PROCEDURE — 84133 ASSAY OF URINE POTASSIUM: CPT

## 2019-10-24 PROCEDURE — 2580000003 HC RX 258: Performed by: NURSE ANESTHETIST, CERTIFIED REGISTERED

## 2019-10-24 PROCEDURE — 3700000000 HC ANESTHESIA ATTENDED CARE: Performed by: SURGERY

## 2019-10-24 PROCEDURE — 36415 COLL VENOUS BLD VENIPUNCTURE: CPT

## 2019-10-24 PROCEDURE — 7100000000 HC PACU RECOVERY - FIRST 15 MIN: Performed by: SURGERY

## 2019-10-24 PROCEDURE — 80202 ASSAY OF VANCOMYCIN: CPT

## 2019-10-24 PROCEDURE — 87070 CULTURE OTHR SPECIMN AEROBIC: CPT

## 2019-10-24 PROCEDURE — 7100000001 HC PACU RECOVERY - ADDTL 15 MIN: Performed by: SURGERY

## 2019-10-24 PROCEDURE — 87205 SMEAR GRAM STAIN: CPT

## 2019-10-24 PROCEDURE — 2060000000 HC ICU INTERMEDIATE R&B

## 2019-10-24 PROCEDURE — 2500000003 HC RX 250 WO HCPCS: Performed by: INTERNAL MEDICINE

## 2019-10-24 PROCEDURE — 85610 PROTHROMBIN TIME: CPT

## 2019-10-24 PROCEDURE — 82570 ASSAY OF URINE CREATININE: CPT

## 2019-10-24 PROCEDURE — 0JBL0ZZ EXCISION OF RIGHT UPPER LEG SUBCUTANEOUS TISSUE AND FASCIA, OPEN APPROACH: ICD-10-PCS | Performed by: SURGERY

## 2019-10-24 PROCEDURE — 87176 TISSUE HOMOGENIZATION CULTR: CPT

## 2019-10-24 PROCEDURE — 83930 ASSAY OF BLOOD OSMOLALITY: CPT

## 2019-10-24 PROCEDURE — 11042 DBRDMT SUBQ TIS 1ST 20SQCM/<: CPT | Performed by: SURGERY

## 2019-10-24 PROCEDURE — 3600000006 HC SURGERY LEVEL 6 BASE: Performed by: SURGERY

## 2019-10-24 PROCEDURE — 6370000000 HC RX 637 (ALT 250 FOR IP): Performed by: INTERNAL MEDICINE

## 2019-10-24 PROCEDURE — 3700000001 HC ADD 15 MINUTES (ANESTHESIA): Performed by: SURGERY

## 2019-10-24 PROCEDURE — 80048 BASIC METABOLIC PNL TOTAL CA: CPT

## 2019-10-24 PROCEDURE — 2580000003 HC RX 258: Performed by: INTERNAL MEDICINE

## 2019-10-24 PROCEDURE — 82436 ASSAY OF URINE CHLORIDE: CPT

## 2019-10-24 PROCEDURE — 6360000002 HC RX W HCPCS: Performed by: NURSE ANESTHETIST, CERTIFIED REGISTERED

## 2019-10-24 PROCEDURE — 6370000000 HC RX 637 (ALT 250 FOR IP): Performed by: SPECIALIST

## 2019-10-24 PROCEDURE — 87075 CULTR BACTERIA EXCEPT BLOOD: CPT

## 2019-10-24 PROCEDURE — 3600000016 HC SURGERY LEVEL 6 ADDTL 15MIN: Performed by: SURGERY

## 2019-10-24 PROCEDURE — 83935 ASSAY OF URINE OSMOLALITY: CPT

## 2019-10-24 PROCEDURE — 84300 ASSAY OF URINE SODIUM: CPT

## 2019-10-24 PROCEDURE — 2709999900 HC NON-CHARGEABLE SUPPLY: Performed by: SURGERY

## 2019-10-24 PROCEDURE — 2500000003 HC RX 250 WO HCPCS: Performed by: SURGERY

## 2019-10-24 PROCEDURE — 85027 COMPLETE CBC AUTOMATED: CPT

## 2019-10-24 PROCEDURE — 0J9L3ZX DRAINAGE OF RIGHT UPPER LEG SUBCUTANEOUS TISSUE AND FASCIA, PERCUTANEOUS APPROACH, DIAGNOSTIC: ICD-10-PCS | Performed by: SURGERY

## 2019-10-24 PROCEDURE — 87102 FUNGUS ISOLATION CULTURE: CPT

## 2019-10-24 PROCEDURE — 2500000003 HC RX 250 WO HCPCS: Performed by: NURSE ANESTHETIST, CERTIFIED REGISTERED

## 2019-10-24 RX ORDER — HYDROMORPHONE HYDROCHLORIDE 1 MG/ML
0.5 INJECTION, SOLUTION INTRAMUSCULAR; INTRAVENOUS; SUBCUTANEOUS EVERY 5 MIN PRN
Status: DISCONTINUED | OUTPATIENT
Start: 2019-10-24 | End: 2019-10-24 | Stop reason: HOSPADM

## 2019-10-24 RX ORDER — SODIUM CHLORIDE 9 MG/ML
INJECTION, SOLUTION INTRAVENOUS CONTINUOUS PRN
Status: DISCONTINUED | OUTPATIENT
Start: 2019-10-24 | End: 2019-10-24 | Stop reason: SDUPTHER

## 2019-10-24 RX ORDER — PROPOFOL 10 MG/ML
INJECTION, EMULSION INTRAVENOUS CONTINUOUS PRN
Status: DISCONTINUED | OUTPATIENT
Start: 2019-10-24 | End: 2019-10-24 | Stop reason: SDUPTHER

## 2019-10-24 RX ORDER — BUPIVACAINE HYDROCHLORIDE AND EPINEPHRINE 2.5; 5 MG/ML; UG/ML
INJECTION, SOLUTION EPIDURAL; INFILTRATION; INTRACAUDAL; PERINEURAL PRN
Status: DISCONTINUED | OUTPATIENT
Start: 2019-10-24 | End: 2019-10-24 | Stop reason: ALTCHOICE

## 2019-10-24 RX ORDER — SODIUM BICARBONATE 650 MG/1
1300 TABLET ORAL 2 TIMES DAILY
Status: DISCONTINUED | OUTPATIENT
Start: 2019-10-24 | End: 2019-10-25

## 2019-10-24 RX ORDER — LIDOCAINE HYDROCHLORIDE 10 MG/ML
INJECTION, SOLUTION EPIDURAL; INFILTRATION; INTRACAUDAL; PERINEURAL PRN
Status: DISCONTINUED | OUTPATIENT
Start: 2019-10-24 | End: 2019-10-24 | Stop reason: ALTCHOICE

## 2019-10-24 RX ORDER — LIDOCAINE HYDROCHLORIDE 10 MG/ML
INJECTION, SOLUTION EPIDURAL; INFILTRATION; INTRACAUDAL; PERINEURAL PRN
Status: DISCONTINUED | OUTPATIENT
Start: 2019-10-24 | End: 2019-10-24 | Stop reason: SDUPTHER

## 2019-10-24 RX ORDER — LINEZOLID 600 MG/1
600 TABLET, FILM COATED ORAL EVERY 12 HOURS SCHEDULED
Status: DISCONTINUED | OUTPATIENT
Start: 2019-10-24 | End: 2019-10-27 | Stop reason: HOSPADM

## 2019-10-24 RX ORDER — WARFARIN SODIUM 5 MG/1
5 TABLET ORAL
Status: COMPLETED | OUTPATIENT
Start: 2019-10-25 | End: 2019-10-25

## 2019-10-24 RX ORDER — MEPERIDINE HYDROCHLORIDE 25 MG/ML
12.5 INJECTION INTRAMUSCULAR; INTRAVENOUS; SUBCUTANEOUS EVERY 5 MIN PRN
Status: DISCONTINUED | OUTPATIENT
Start: 2019-10-24 | End: 2019-10-24 | Stop reason: HOSPADM

## 2019-10-24 RX ADMIN — METOPROLOL TARTRATE 25 MG: 25 TABLET ORAL at 20:08

## 2019-10-24 RX ADMIN — PROPOFOL 50 MCG/KG/MIN: 10 INJECTION, EMULSION INTRAVENOUS at 10:53

## 2019-10-24 RX ADMIN — Medication 10 ML: at 20:09

## 2019-10-24 RX ADMIN — SODIUM CHLORIDE: 9 INJECTION, SOLUTION INTRAVENOUS at 10:50

## 2019-10-24 RX ADMIN — SODIUM BICARBONATE 650 MG TABLET 1300 MG: at 20:08

## 2019-10-24 RX ADMIN — SODIUM BICARBONATE: 84 INJECTION, SOLUTION INTRAVENOUS at 07:02

## 2019-10-24 RX ADMIN — PANTOPRAZOLE SODIUM 40 MG: 40 TABLET, DELAYED RELEASE ORAL at 05:31

## 2019-10-24 RX ADMIN — ACETAMINOPHEN 650 MG: 325 TABLET, FILM COATED ORAL at 20:09

## 2019-10-24 RX ADMIN — SODIUM BICARBONATE: 84 INJECTION, SOLUTION INTRAVENOUS at 18:00

## 2019-10-24 RX ADMIN — LINEZOLID 600 MG: 600 TABLET, FILM COATED ORAL at 13:45

## 2019-10-24 RX ADMIN — LIDOCAINE HYDROCHLORIDE 20 MG: 10 INJECTION, SOLUTION EPIDURAL; INFILTRATION; INTRACAUDAL; PERINEURAL at 10:53

## 2019-10-24 ASSESSMENT — PULMONARY FUNCTION TESTS
PIF_VALUE: 3
PIF_VALUE: -15
PIF_VALUE: 4
PIF_VALUE: 3
PIF_VALUE: 1
PIF_VALUE: 3
PIF_VALUE: 42
PIF_VALUE: 3
PIF_VALUE: 3
PIF_VALUE: -15
PIF_VALUE: 3
PIF_VALUE: 0
PIF_VALUE: 3
PIF_VALUE: -15

## 2019-10-24 ASSESSMENT — PAIN SCALES - GENERAL
PAINLEVEL_OUTOF10: 0
PAINLEVEL_OUTOF10: 7
PAINLEVEL_OUTOF10: 0
PAINLEVEL_OUTOF10: 7

## 2019-10-24 ASSESSMENT — PAIN DESCRIPTION - PAIN TYPE
TYPE: ACUTE PAIN
TYPE: ACUTE PAIN

## 2019-10-24 ASSESSMENT — PAIN DESCRIPTION - ORIENTATION: ORIENTATION: RIGHT

## 2019-10-24 ASSESSMENT — PAIN SCALES - WONG BAKER
WONGBAKER_NUMERICALRESPONSE: 0
WONGBAKER_NUMERICALRESPONSE: 0

## 2019-10-24 ASSESSMENT — PAIN DESCRIPTION - DESCRIPTORS
DESCRIPTORS: ACHING
DESCRIPTORS: ACHING

## 2019-10-24 ASSESSMENT — PAIN DESCRIPTION - LOCATION
LOCATION: LEG
LOCATION: LEG

## 2019-10-25 LAB
ANION GAP SERPL CALCULATED.3IONS-SCNC: 10 MMOL/L (ref 7–16)
BUN BLDV-MCNC: 31 MG/DL (ref 8–23)
CALCIUM SERPL-MCNC: 8.9 MG/DL (ref 8.6–10.2)
CHLORIDE BLD-SCNC: 102 MMOL/L (ref 98–107)
CO2: 25 MMOL/L (ref 22–29)
CREAT SERPL-MCNC: 1.4 MG/DL (ref 0.5–1)
GFR AFRICAN AMERICAN: 43
GFR NON-AFRICAN AMERICAN: 36 ML/MIN/1.73
GLUCOSE BLD-MCNC: 104 MG/DL (ref 74–99)
HCT VFR BLD CALC: 31.1 % (ref 34–48)
HEMOGLOBIN: 9.7 G/DL (ref 11.5–15.5)
INR BLD: 1.4
IRON SATURATION: 13 % (ref 15–50)
IRON: 35 MCG/DL (ref 37–145)
LV EF: 50 %
LVEF MODALITY: NORMAL
MCH RBC QN AUTO: 31.2 PG (ref 26–35)
MCHC RBC AUTO-ENTMCNC: 31.2 % (ref 32–34.5)
MCV RBC AUTO: 100 FL (ref 80–99.9)
PDW BLD-RTO: 15.8 FL (ref 11.5–15)
PLATELET # BLD: 226 E9/L (ref 130–450)
PMV BLD AUTO: 10.9 FL (ref 7–12)
POTASSIUM SERPL-SCNC: 5.2 MMOL/L (ref 3.5–5)
PROTHROMBIN TIME: 16.3 SEC (ref 9.3–12.4)
RBC # BLD: 3.11 E12/L (ref 3.5–5.5)
SODIUM BLD-SCNC: 137 MMOL/L (ref 132–146)
TOTAL IRON BINDING CAPACITY: 276 MCG/DL (ref 250–450)
WBC # BLD: 7.2 E9/L (ref 4.5–11.5)
WOUND/ABSCESS: NORMAL

## 2019-10-25 PROCEDURE — 97530 THERAPEUTIC ACTIVITIES: CPT

## 2019-10-25 PROCEDURE — 83550 IRON BINDING TEST: CPT

## 2019-10-25 PROCEDURE — 97116 GAIT TRAINING THERAPY: CPT | Performed by: PHYSICAL THERAPIST

## 2019-10-25 PROCEDURE — 2500000003 HC RX 250 WO HCPCS: Performed by: INTERNAL MEDICINE

## 2019-10-25 PROCEDURE — 6370000000 HC RX 637 (ALT 250 FOR IP): Performed by: INTERNAL MEDICINE

## 2019-10-25 PROCEDURE — 85610 PROTHROMBIN TIME: CPT

## 2019-10-25 PROCEDURE — 6370000000 HC RX 637 (ALT 250 FOR IP): Performed by: SPECIALIST

## 2019-10-25 PROCEDURE — 97162 PT EVAL MOD COMPLEX 30 MIN: CPT | Performed by: PHYSICAL THERAPIST

## 2019-10-25 PROCEDURE — 36415 COLL VENOUS BLD VENIPUNCTURE: CPT

## 2019-10-25 PROCEDURE — 97535 SELF CARE MNGMENT TRAINING: CPT

## 2019-10-25 PROCEDURE — 83540 ASSAY OF IRON: CPT

## 2019-10-25 PROCEDURE — 85027 COMPLETE CBC AUTOMATED: CPT

## 2019-10-25 PROCEDURE — 2580000003 HC RX 258: Performed by: INTERNAL MEDICINE

## 2019-10-25 PROCEDURE — 93306 TTE W/DOPPLER COMPLETE: CPT

## 2019-10-25 PROCEDURE — 97165 OT EVAL LOW COMPLEX 30 MIN: CPT

## 2019-10-25 PROCEDURE — 2060000000 HC ICU INTERMEDIATE R&B

## 2019-10-25 PROCEDURE — 80048 BASIC METABOLIC PNL TOTAL CA: CPT

## 2019-10-25 RX ORDER — SODIUM BICARBONATE 650 MG/1
650 TABLET ORAL 2 TIMES DAILY
Status: DISCONTINUED | OUTPATIENT
Start: 2019-10-25 | End: 2019-10-27 | Stop reason: HOSPADM

## 2019-10-25 RX ADMIN — Medication 10 ML: at 20:30

## 2019-10-25 RX ADMIN — METOPROLOL TARTRATE 25 MG: 25 TABLET ORAL at 08:23

## 2019-10-25 RX ADMIN — METOPROLOL TARTRATE 25 MG: 25 TABLET ORAL at 20:30

## 2019-10-25 RX ADMIN — PANTOPRAZOLE SODIUM 40 MG: 40 TABLET, DELAYED RELEASE ORAL at 05:31

## 2019-10-25 RX ADMIN — SODIUM BICARBONATE: 84 INJECTION, SOLUTION INTRAVENOUS at 05:42

## 2019-10-25 RX ADMIN — SODIUM BICARBONATE 650 MG TABLET 1300 MG: at 08:23

## 2019-10-25 RX ADMIN — SODIUM BICARBONATE 650 MG TABLET 650 MG: at 20:29

## 2019-10-25 RX ADMIN — LINEZOLID 600 MG: 600 TABLET, FILM COATED ORAL at 08:23

## 2019-10-25 RX ADMIN — LINEZOLID 600 MG: 600 TABLET, FILM COATED ORAL at 20:29

## 2019-10-25 RX ADMIN — ACETAMINOPHEN 650 MG: 325 TABLET, FILM COATED ORAL at 10:56

## 2019-10-25 RX ADMIN — WARFARIN SODIUM 5 MG: 5 TABLET ORAL at 16:12

## 2019-10-25 RX ADMIN — ACETAMINOPHEN 650 MG: 325 TABLET, FILM COATED ORAL at 16:11

## 2019-10-25 RX ADMIN — GENTAMICIN SULFATE: 1 OINTMENT TOPICAL at 08:23

## 2019-10-25 RX ADMIN — FERROUS SULFATE TAB 325 MG (65 MG ELEMENTAL FE) 325 MG: 325 (65 FE) TAB at 13:31

## 2019-10-25 RX ADMIN — ACETAMINOPHEN 650 MG: 325 TABLET, FILM COATED ORAL at 20:29

## 2019-10-25 RX ADMIN — LEVOFLOXACIN 750 MG: 750 TABLET, FILM COATED ORAL at 08:23

## 2019-10-25 ASSESSMENT — PAIN SCALES - GENERAL
PAINLEVEL_OUTOF10: 7
PAINLEVEL_OUTOF10: 7
PAINLEVEL_OUTOF10: 0
PAINLEVEL_OUTOF10: 6
PAINLEVEL_OUTOF10: 0

## 2019-10-26 LAB
ANION GAP SERPL CALCULATED.3IONS-SCNC: 11 MMOL/L (ref 7–16)
ANION GAP SERPL CALCULATED.3IONS-SCNC: 14 MMOL/L (ref 7–16)
BUN BLDV-MCNC: 33 MG/DL (ref 8–23)
BUN BLDV-MCNC: 34 MG/DL (ref 8–23)
CALCIUM SERPL-MCNC: 8.2 MG/DL (ref 8.6–10.2)
CALCIUM SERPL-MCNC: 8.4 MG/DL (ref 8.6–10.2)
CHLORIDE BLD-SCNC: 101 MMOL/L (ref 98–107)
CHLORIDE BLD-SCNC: 101 MMOL/L (ref 98–107)
CO2: 23 MMOL/L (ref 22–29)
CO2: 24 MMOL/L (ref 22–29)
CREAT SERPL-MCNC: 2 MG/DL (ref 0.5–1)
CREAT SERPL-MCNC: 2.1 MG/DL (ref 0.5–1)
CULTURE SURGICAL: NORMAL
CULTURE SURGICAL: NORMAL
FERRITIN: 114 NG/ML
GFR AFRICAN AMERICAN: 27
GFR AFRICAN AMERICAN: 29
GFR NON-AFRICAN AMERICAN: 22 ML/MIN/1.73
GFR NON-AFRICAN AMERICAN: 24 ML/MIN/1.73
GLUCOSE BLD-MCNC: 105 MG/DL (ref 74–99)
GLUCOSE BLD-MCNC: 87 MG/DL (ref 74–99)
HCT VFR BLD CALC: 27.8 % (ref 34–48)
HEMOGLOBIN: 8.5 G/DL (ref 11.5–15.5)
INR BLD: 1.5
MAGNESIUM: 1.3 MG/DL (ref 1.6–2.6)
MCH RBC QN AUTO: 31 PG (ref 26–35)
MCHC RBC AUTO-ENTMCNC: 30.6 % (ref 32–34.5)
MCV RBC AUTO: 101.5 FL (ref 80–99.9)
PDW BLD-RTO: 15.7 FL (ref 11.5–15)
PHOSPHORUS: 3.4 MG/DL (ref 2.5–4.5)
PLATELET # BLD: 181 E9/L (ref 130–450)
PMV BLD AUTO: 11.2 FL (ref 7–12)
POTASSIUM SERPL-SCNC: 4.6 MMOL/L (ref 3.5–5)
POTASSIUM SERPL-SCNC: 4.7 MMOL/L (ref 3.5–5)
PROTHROMBIN TIME: 17.3 SEC (ref 9.3–12.4)
RBC # BLD: 2.74 E12/L (ref 3.5–5.5)
SODIUM BLD-SCNC: 135 MMOL/L (ref 132–146)
SODIUM BLD-SCNC: 139 MMOL/L (ref 132–146)
WBC # BLD: 5.8 E9/L (ref 4.5–11.5)

## 2019-10-26 PROCEDURE — 82728 ASSAY OF FERRITIN: CPT

## 2019-10-26 PROCEDURE — 83735 ASSAY OF MAGNESIUM: CPT

## 2019-10-26 PROCEDURE — 2580000003 HC RX 258: Performed by: INTERNAL MEDICINE

## 2019-10-26 PROCEDURE — 6370000000 HC RX 637 (ALT 250 FOR IP): Performed by: INTERNAL MEDICINE

## 2019-10-26 PROCEDURE — 6370000000 HC RX 637 (ALT 250 FOR IP): Performed by: SPECIALIST

## 2019-10-26 PROCEDURE — 85610 PROTHROMBIN TIME: CPT

## 2019-10-26 PROCEDURE — 6370000000 HC RX 637 (ALT 250 FOR IP): Performed by: NURSE PRACTITIONER

## 2019-10-26 PROCEDURE — 84100 ASSAY OF PHOSPHORUS: CPT

## 2019-10-26 PROCEDURE — 36415 COLL VENOUS BLD VENIPUNCTURE: CPT

## 2019-10-26 PROCEDURE — 2060000000 HC ICU INTERMEDIATE R&B

## 2019-10-26 PROCEDURE — 85027 COMPLETE CBC AUTOMATED: CPT

## 2019-10-26 PROCEDURE — 6360000002 HC RX W HCPCS: Performed by: INTERNAL MEDICINE

## 2019-10-26 PROCEDURE — 80048 BASIC METABOLIC PNL TOTAL CA: CPT

## 2019-10-26 RX ORDER — LEVOFLOXACIN 750 MG/1
750 TABLET ORAL EVERY OTHER DAY
Qty: 7 TABLET | Refills: 0 | Status: SHIPPED | OUTPATIENT
Start: 2019-10-27 | End: 2019-11-03

## 2019-10-26 RX ORDER — WARFARIN SODIUM 5 MG/1
5 TABLET ORAL
Status: COMPLETED | OUTPATIENT
Start: 2019-10-26 | End: 2019-10-26

## 2019-10-26 RX ORDER — MAGNESIUM SULFATE 1 G/100ML
1 INJECTION INTRAVENOUS ONCE
Status: COMPLETED | OUTPATIENT
Start: 2019-10-26 | End: 2019-10-26

## 2019-10-26 RX ORDER — SODIUM BICARBONATE 650 MG/1
650 TABLET ORAL DAILY
Qty: 30 TABLET | Refills: 0 | Status: SHIPPED | OUTPATIENT
Start: 2019-10-26 | End: 2019-11-11

## 2019-10-26 RX ORDER — LINEZOLID 600 MG/1
600 TABLET, FILM COATED ORAL EVERY 12 HOURS SCHEDULED
Qty: 14 TABLET | Refills: 0 | Status: SHIPPED | OUTPATIENT
Start: 2019-10-26 | End: 2019-11-02

## 2019-10-26 RX ORDER — SODIUM CHLORIDE 9 MG/ML
INJECTION, SOLUTION INTRAVENOUS CONTINUOUS
Status: ACTIVE | OUTPATIENT
Start: 2019-10-26 | End: 2019-10-26

## 2019-10-26 RX ADMIN — METOPROLOL TARTRATE 25 MG: 25 TABLET ORAL at 20:15

## 2019-10-26 RX ADMIN — ACETAMINOPHEN 650 MG: 325 TABLET, FILM COATED ORAL at 11:49

## 2019-10-26 RX ADMIN — ACETAMINOPHEN 650 MG: 325 TABLET, FILM COATED ORAL at 06:24

## 2019-10-26 RX ADMIN — Medication 10 ML: at 08:56

## 2019-10-26 RX ADMIN — ACETAMINOPHEN 650 MG: 325 TABLET, FILM COATED ORAL at 23:07

## 2019-10-26 RX ADMIN — LINEZOLID 600 MG: 600 TABLET, FILM COATED ORAL at 20:15

## 2019-10-26 RX ADMIN — LINEZOLID 600 MG: 600 TABLET, FILM COATED ORAL at 08:56

## 2019-10-26 RX ADMIN — ACETAMINOPHEN 650 MG: 325 TABLET, FILM COATED ORAL at 00:54

## 2019-10-26 RX ADMIN — MAGNESIUM SULFATE HEPTAHYDRATE 1 G: 1 INJECTION, SOLUTION INTRAVENOUS at 10:44

## 2019-10-26 RX ADMIN — SODIUM BICARBONATE 650 MG TABLET 650 MG: at 08:56

## 2019-10-26 RX ADMIN — WARFARIN SODIUM 5 MG: 5 TABLET ORAL at 17:08

## 2019-10-26 RX ADMIN — Medication 10 ML: at 20:15

## 2019-10-26 RX ADMIN — SODIUM BICARBONATE 650 MG TABLET 650 MG: at 20:15

## 2019-10-26 RX ADMIN — GENTAMICIN SULFATE: 1 OINTMENT TOPICAL at 16:09

## 2019-10-26 RX ADMIN — METOPROLOL TARTRATE 25 MG: 25 TABLET ORAL at 08:56

## 2019-10-26 RX ADMIN — PANTOPRAZOLE SODIUM 40 MG: 40 TABLET, DELAYED RELEASE ORAL at 06:20

## 2019-10-26 RX ADMIN — SODIUM CHLORIDE: 9 INJECTION, SOLUTION INTRAVENOUS at 10:44

## 2019-10-26 ASSESSMENT — PAIN DESCRIPTION - DESCRIPTORS
DESCRIPTORS: JABBING;BURNING
DESCRIPTORS: BURNING;JABBING
DESCRIPTORS: ACHING;DISCOMFORT

## 2019-10-26 ASSESSMENT — PAIN SCALES - GENERAL
PAINLEVEL_OUTOF10: 0
PAINLEVEL_OUTOF10: 10
PAINLEVEL_OUTOF10: 8
PAINLEVEL_OUTOF10: 2
PAINLEVEL_OUTOF10: 4
PAINLEVEL_OUTOF10: 0
PAINLEVEL_OUTOF10: 3
PAINLEVEL_OUTOF10: 0
PAINLEVEL_OUTOF10: 4

## 2019-10-26 ASSESSMENT — PAIN - FUNCTIONAL ASSESSMENT
PAIN_FUNCTIONAL_ASSESSMENT: ACTIVITIES ARE NOT PREVENTED
PAIN_FUNCTIONAL_ASSESSMENT: ACTIVITIES ARE NOT PREVENTED

## 2019-10-26 ASSESSMENT — PAIN DESCRIPTION - LOCATION
LOCATION: LEG
LOCATION: LEG

## 2019-10-26 ASSESSMENT — PAIN DESCRIPTION - ONSET
ONSET: ON-GOING
ONSET: ON-GOING

## 2019-10-26 ASSESSMENT — PAIN DESCRIPTION - PAIN TYPE
TYPE: ACUTE PAIN
TYPE: ACUTE PAIN

## 2019-10-26 ASSESSMENT — PAIN DESCRIPTION - FREQUENCY
FREQUENCY: INTERMITTENT
FREQUENCY: INTERMITTENT

## 2019-10-26 ASSESSMENT — PAIN DESCRIPTION - ORIENTATION
ORIENTATION: RIGHT;UPPER
ORIENTATION: RIGHT;UPPER

## 2019-10-26 ASSESSMENT — PAIN DESCRIPTION - PROGRESSION: CLINICAL_PROGRESSION: GRADUALLY WORSENING

## 2019-10-27 VITALS
OXYGEN SATURATION: 99 % | WEIGHT: 230.4 LBS | HEIGHT: 55 IN | SYSTOLIC BLOOD PRESSURE: 119 MMHG | BODY MASS INDEX: 53.32 KG/M2 | TEMPERATURE: 97.9 F | RESPIRATION RATE: 16 BRPM | HEART RATE: 16 BPM | DIASTOLIC BLOOD PRESSURE: 58 MMHG

## 2019-10-27 LAB
ANION GAP SERPL CALCULATED.3IONS-SCNC: 12 MMOL/L (ref 7–16)
BUN BLDV-MCNC: 34 MG/DL (ref 8–23)
CALCIUM SERPL-MCNC: 8.5 MG/DL (ref 8.6–10.2)
CHLORIDE BLD-SCNC: 99 MMOL/L (ref 98–107)
CO2: 24 MMOL/L (ref 22–29)
CREAT SERPL-MCNC: 2.1 MG/DL (ref 0.5–1)
GFR AFRICAN AMERICAN: 27
GFR NON-AFRICAN AMERICAN: 22 ML/MIN/1.73
GLUCOSE BLD-MCNC: 97 MG/DL (ref 74–99)
HCT VFR BLD CALC: 28.6 % (ref 34–48)
HEMOGLOBIN: 8.8 G/DL (ref 11.5–15.5)
INR BLD: 1.6
MAGNESIUM: 1.6 MG/DL (ref 1.6–2.6)
MCH RBC QN AUTO: 31.3 PG (ref 26–35)
MCHC RBC AUTO-ENTMCNC: 30.8 % (ref 32–34.5)
MCV RBC AUTO: 101.8 FL (ref 80–99.9)
PDW BLD-RTO: 15.7 FL (ref 11.5–15)
PHOSPHORUS: 4.2 MG/DL (ref 2.5–4.5)
PLATELET # BLD: 185 E9/L (ref 130–450)
PMV BLD AUTO: 11.1 FL (ref 7–12)
POTASSIUM SERPL-SCNC: 4.4 MMOL/L (ref 3.5–5)
PROTHROMBIN TIME: 18.1 SEC (ref 9.3–12.4)
RBC # BLD: 2.81 E12/L (ref 3.5–5.5)
SODIUM BLD-SCNC: 135 MMOL/L (ref 132–146)
WBC # BLD: 5.5 E9/L (ref 4.5–11.5)

## 2019-10-27 PROCEDURE — 85027 COMPLETE CBC AUTOMATED: CPT

## 2019-10-27 PROCEDURE — 36415 COLL VENOUS BLD VENIPUNCTURE: CPT

## 2019-10-27 PROCEDURE — 85610 PROTHROMBIN TIME: CPT

## 2019-10-27 PROCEDURE — 84100 ASSAY OF PHOSPHORUS: CPT

## 2019-10-27 PROCEDURE — 6370000000 HC RX 637 (ALT 250 FOR IP): Performed by: INTERNAL MEDICINE

## 2019-10-27 PROCEDURE — 83735 ASSAY OF MAGNESIUM: CPT

## 2019-10-27 PROCEDURE — 6370000000 HC RX 637 (ALT 250 FOR IP): Performed by: SPECIALIST

## 2019-10-27 PROCEDURE — 2580000003 HC RX 258: Performed by: INTERNAL MEDICINE

## 2019-10-27 PROCEDURE — 80048 BASIC METABOLIC PNL TOTAL CA: CPT

## 2019-10-27 RX ORDER — WARFARIN SODIUM 5 MG/1
5 TABLET ORAL
Status: DISCONTINUED | OUTPATIENT
Start: 2019-10-27 | End: 2019-10-27 | Stop reason: HOSPADM

## 2019-10-27 RX ADMIN — SODIUM BICARBONATE 650 MG TABLET 650 MG: at 08:49

## 2019-10-27 RX ADMIN — GENTAMICIN SULFATE: 1 OINTMENT TOPICAL at 11:54

## 2019-10-27 RX ADMIN — FERROUS SULFATE TAB 325 MG (65 MG ELEMENTAL FE) 325 MG: 325 (65 FE) TAB at 11:54

## 2019-10-27 RX ADMIN — LINEZOLID 600 MG: 600 TABLET, FILM COATED ORAL at 08:51

## 2019-10-27 RX ADMIN — METOPROLOL TARTRATE 25 MG: 25 TABLET ORAL at 08:49

## 2019-10-27 RX ADMIN — PANTOPRAZOLE SODIUM 40 MG: 40 TABLET, DELAYED RELEASE ORAL at 06:34

## 2019-10-27 RX ADMIN — LEVOFLOXACIN 750 MG: 750 TABLET, FILM COATED ORAL at 08:49

## 2019-10-27 RX ADMIN — ACETAMINOPHEN 650 MG: 325 TABLET, FILM COATED ORAL at 03:29

## 2019-10-27 RX ADMIN — Medication 10 ML: at 08:49

## 2019-10-27 RX ADMIN — ACETAMINOPHEN 650 MG: 325 TABLET, FILM COATED ORAL at 11:54

## 2019-10-27 RX ADMIN — ACETAMINOPHEN 650 MG: 325 TABLET, FILM COATED ORAL at 07:48

## 2019-10-27 ASSESSMENT — PAIN SCALES - GENERAL
PAINLEVEL_OUTOF10: 3
PAINLEVEL_OUTOF10: 6
PAINLEVEL_OUTOF10: 0
PAINLEVEL_OUTOF10: 7
PAINLEVEL_OUTOF10: 4
PAINLEVEL_OUTOF10: 2
PAINLEVEL_OUTOF10: 0

## 2019-10-28 ENCOUNTER — HOSPITAL ENCOUNTER (OUTPATIENT)
Dept: WOUND CARE | Age: 84
Discharge: HOME | End: 2019-10-28
Payer: MEDICARE

## 2019-10-28 VITALS
HEART RATE: 80 BPM | DIASTOLIC BLOOD PRESSURE: 76 MMHG | WEIGHT: 224 LBS | RESPIRATION RATE: 16 BRPM | SYSTOLIC BLOOD PRESSURE: 118 MMHG | TEMPERATURE: 97.6 F | BODY MASS INDEX: 54.01 KG/M2

## 2019-10-28 DIAGNOSIS — S71.101A OPEN THIGH WOUND, RIGHT, INITIAL ENCOUNTER: ICD-10-CM

## 2019-10-28 PROCEDURE — 99213 OFFICE O/P EST LOW 20 MIN: CPT | Performed by: SURGERY

## 2019-10-28 PROCEDURE — 99213 OFFICE O/P EST LOW 20 MIN: CPT

## 2019-10-28 RX ORDER — LIDOCAINE HYDROCHLORIDE 20 MG/ML
JELLY TOPICAL ONCE
Status: DISCONTINUED | OUTPATIENT
Start: 2019-10-28 | End: 2019-10-29 | Stop reason: HOSPADM

## 2019-10-28 ASSESSMENT — PAIN SCALES - GENERAL: PAINLEVEL_OUTOF10: 0

## 2019-10-29 LAB
ANAEROBIC CULTURE: NORMAL
ANAEROBIC CULTURE: NORMAL

## 2019-11-11 ENCOUNTER — HOSPITAL ENCOUNTER (OUTPATIENT)
Dept: WOUND CARE | Age: 84
Discharge: HOME | End: 2019-11-11
Payer: MEDICARE

## 2019-11-11 VITALS
SYSTOLIC BLOOD PRESSURE: 120 MMHG | WEIGHT: 224 LBS | BODY MASS INDEX: 51.84 KG/M2 | HEART RATE: 76 BPM | HEIGHT: 55 IN | DIASTOLIC BLOOD PRESSURE: 60 MMHG | RESPIRATION RATE: 18 BRPM | TEMPERATURE: 97.7 F

## 2019-11-11 DIAGNOSIS — S71.101A OPEN THIGH WOUND, RIGHT, INITIAL ENCOUNTER: Primary | ICD-10-CM

## 2019-11-11 PROCEDURE — 11042 DBRDMT SUBQ TIS 1ST 20SQCM/<: CPT | Performed by: SURGERY

## 2019-11-11 PROCEDURE — 11042 DBRDMT SUBQ TIS 1ST 20SQCM/<: CPT

## 2019-11-11 RX ORDER — LIDOCAINE HYDROCHLORIDE 20 MG/ML
JELLY TOPICAL ONCE
Status: DISCONTINUED | OUTPATIENT
Start: 2019-11-11 | End: 2019-11-12 | Stop reason: HOSPADM

## 2019-11-11 ASSESSMENT — PAIN SCALES - GENERAL: PAINLEVEL_OUTOF10: 10

## 2019-11-11 ASSESSMENT — PAIN DESCRIPTION - LOCATION: LOCATION: LEG

## 2019-11-11 ASSESSMENT — PAIN - FUNCTIONAL ASSESSMENT: PAIN_FUNCTIONAL_ASSESSMENT: ACTIVITIES ARE NOT PREVENTED

## 2019-11-11 ASSESSMENT — PAIN DESCRIPTION - FREQUENCY: FREQUENCY: INTERMITTENT

## 2019-11-11 ASSESSMENT — PAIN DESCRIPTION - DESCRIPTORS: DESCRIPTORS: ACHING;CONSTANT;DISCOMFORT

## 2019-11-11 ASSESSMENT — PAIN DESCRIPTION - ORIENTATION: ORIENTATION: RIGHT

## 2019-11-11 ASSESSMENT — PAIN DESCRIPTION - PROGRESSION: CLINICAL_PROGRESSION: GRADUALLY WORSENING

## 2019-11-11 ASSESSMENT — PAIN DESCRIPTION - ONSET: ONSET: ON-GOING

## 2019-11-18 ENCOUNTER — HOSPITAL ENCOUNTER (OUTPATIENT)
Dept: WOUND CARE | Age: 84
Discharge: HOME | End: 2019-11-18
Payer: MEDICARE

## 2019-11-18 VITALS
RESPIRATION RATE: 18 BRPM | HEART RATE: 62 BPM | BODY MASS INDEX: 51.84 KG/M2 | TEMPERATURE: 97.7 F | WEIGHT: 224 LBS | HEIGHT: 55 IN | DIASTOLIC BLOOD PRESSURE: 62 MMHG | SYSTOLIC BLOOD PRESSURE: 100 MMHG

## 2019-11-18 DIAGNOSIS — S71.101A OPEN THIGH WOUND, RIGHT, INITIAL ENCOUNTER: Primary | ICD-10-CM

## 2019-11-18 PROCEDURE — 11042 DBRDMT SUBQ TIS 1ST 20SQCM/<: CPT

## 2019-11-18 PROCEDURE — 11042 DBRDMT SUBQ TIS 1ST 20SQCM/<: CPT | Performed by: SURGERY

## 2019-11-18 RX ORDER — LIDOCAINE HYDROCHLORIDE 20 MG/ML
JELLY TOPICAL PRN
Status: DISCONTINUED | OUTPATIENT
Start: 2019-11-18 | End: 2019-11-19 | Stop reason: HOSPADM

## 2019-11-18 ASSESSMENT — PAIN DESCRIPTION - ONSET: ONSET: ON-GOING

## 2019-11-18 ASSESSMENT — PAIN DESCRIPTION - FREQUENCY: FREQUENCY: INTERMITTENT

## 2019-11-18 ASSESSMENT — PAIN DESCRIPTION - ORIENTATION: ORIENTATION: RIGHT

## 2019-11-18 ASSESSMENT — PAIN DESCRIPTION - PAIN TYPE: TYPE: ACUTE PAIN

## 2019-11-18 ASSESSMENT — PAIN SCALES - GENERAL: PAINLEVEL_OUTOF10: 10

## 2019-11-18 ASSESSMENT — PAIN DESCRIPTION - PROGRESSION: CLINICAL_PROGRESSION: GRADUALLY WORSENING

## 2019-11-18 ASSESSMENT — PAIN DESCRIPTION - DESCRIPTORS: DESCRIPTORS: ACHING;BURNING;CONSTANT

## 2019-11-18 ASSESSMENT — PAIN DESCRIPTION - LOCATION: LOCATION: LEG

## 2019-11-25 LAB
FUNGUS (MYCOLOGY) CULTURE: NORMAL
FUNGUS STAIN: NORMAL

## 2019-12-02 ENCOUNTER — HOSPITAL ENCOUNTER (OUTPATIENT)
Dept: WOUND CARE | Age: 84
Discharge: HOME | End: 2019-12-02
Payer: MEDICARE

## 2019-12-02 VITALS
HEART RATE: 70 BPM | TEMPERATURE: 97.7 F | SYSTOLIC BLOOD PRESSURE: 100 MMHG | BODY MASS INDEX: 52.3 KG/M2 | HEIGHT: 55 IN | DIASTOLIC BLOOD PRESSURE: 64 MMHG | WEIGHT: 226 LBS | RESPIRATION RATE: 20 BRPM

## 2019-12-02 DIAGNOSIS — S71.101A OPEN THIGH WOUND, RIGHT, INITIAL ENCOUNTER: Primary | ICD-10-CM

## 2019-12-02 PROCEDURE — 11042 DBRDMT SUBQ TIS 1ST 20SQCM/<: CPT

## 2019-12-02 PROCEDURE — 11042 DBRDMT SUBQ TIS 1ST 20SQCM/<: CPT | Performed by: SURGERY

## 2019-12-02 RX ORDER — LIDOCAINE HYDROCHLORIDE 20 MG/ML
JELLY TOPICAL ONCE
Status: DISCONTINUED | OUTPATIENT
Start: 2019-12-02 | End: 2019-12-03 | Stop reason: HOSPADM

## 2019-12-02 RX ORDER — BUMETANIDE 0.5 MG/1
0.5 TABLET ORAL DAILY
COMMUNITY
End: 2020-01-27 | Stop reason: ALTCHOICE

## 2019-12-23 ENCOUNTER — HOSPITAL ENCOUNTER (OUTPATIENT)
Dept: WOUND CARE | Age: 84
Discharge: HOME | End: 2019-12-23
Payer: MEDICARE

## 2019-12-23 VITALS — HEART RATE: 80 BPM | SYSTOLIC BLOOD PRESSURE: 130 MMHG | RESPIRATION RATE: 14 BRPM | DIASTOLIC BLOOD PRESSURE: 60 MMHG

## 2019-12-23 DIAGNOSIS — S71.101A OPEN THIGH WOUND, RIGHT, INITIAL ENCOUNTER: Primary | ICD-10-CM

## 2019-12-23 PROCEDURE — 97597 DBRDMT OPN WND 1ST 20 CM/<: CPT | Performed by: SURGERY

## 2019-12-23 PROCEDURE — 11042 DBRDMT SUBQ TIS 1ST 20SQCM/<: CPT

## 2020-01-06 ENCOUNTER — HOSPITAL ENCOUNTER (OUTPATIENT)
Dept: WOUND CARE | Age: 85
Discharge: HOME | End: 2020-01-06
Payer: MEDICARE

## 2020-01-06 VITALS
WEIGHT: 233 LBS | RESPIRATION RATE: 24 BRPM | HEIGHT: 55 IN | HEART RATE: 84 BPM | BODY MASS INDEX: 53.92 KG/M2 | TEMPERATURE: 98 F | DIASTOLIC BLOOD PRESSURE: 60 MMHG | SYSTOLIC BLOOD PRESSURE: 100 MMHG

## 2020-01-06 PROCEDURE — 11042 DBRDMT SUBQ TIS 1ST 20SQCM/<: CPT | Performed by: SURGERY

## 2020-01-06 PROCEDURE — 11042 DBRDMT SUBQ TIS 1ST 20SQCM/<: CPT

## 2020-01-06 RX ORDER — LIDOCAINE HYDROCHLORIDE 20 MG/ML
JELLY TOPICAL ONCE
Status: DISCONTINUED | OUTPATIENT
Start: 2020-01-06 | End: 2020-01-07 | Stop reason: HOSPADM

## 2020-01-06 ASSESSMENT — PAIN SCALES - GENERAL: PAINLEVEL_OUTOF10: 0

## 2020-01-06 NOTE — PROGRESS NOTES
W[facility] Healing Center Followup Visit Note    Referring Physician : David Mchugh MD  [address] [facility] RECORD NUMBER:  [id number]  AGE: 81 y.o. GENDER: female  : 1935  EPISODE DATE:  2020    Subjective:     Chief Complaint   Patient presents with    Wound Check     right thigh      HISTORY of PRESENT ILLNESS HPI  Lianna Felix a 81 y. o. female who presents today for wound/ulcer evaluation. History of Wound Context:  The patient has had a wound of right which was first noted approximately 4 weeks ago. .  This has been treated with local debridement however she was found to have underlying fluid collection while in the hospital last week. She under excisional debridement with drainage of fluid collection. The patient has noted that the wound has been improving.  The patient has not had similar previous wounds in the past.       19 update - wound is filling in nicely. No more tunneling. Patient is using less and less aquacel rope.    - wound continues to heal. No signs of infection.    - Wound has contracted but continues to be slow to heal. No infection. Persistent edema   - Continues to contract. Less edema. : wound small. Top layer of fibrinous exudate removed.     Pt is currently no longer on abx.     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  Moderate dilation of RA Moderate TR RV systolic pressure = 60 mm Hg     Bilateral sensorineural hearing loss 2015    Degenerative joint disease of sacroiliac region 3/29/2013    HTN (hypertension) 2012    Morbid obesity with BMI of 50.0-59.9, adult [facility])      Past Surgical History:   Procedure Measurements:  Wound/Ulcer Descriptions are Pre Debridement except measurements:    Wound 09/17/19 Thigh Right;Medial #1 Right Medial inner Thigh wound (Acquired August 2019) (Active)   Wound Image   9/17/2019  1:40 PM   Dressing Status Clean;Dry; Intact 10/22/2019  2:12 PM   Dressing Changed Changed/New 12/23/2019  1:38 PM   Dressing/Treatment Alginate;Hydrocolloid 12/23/2019  1:38 PM   Wound Cleansed Rinsed/Irrigated with saline 10/22/2019  2:12 PM   Wound Length (cm) 1.3 cm 1/6/2020  1:24 PM   Wound Width (cm) 2.4 cm 1/6/2020  1:24 PM   Wound Depth (cm) 0.5 cm 1/6/2020  1:24 PM   Wound Surface Area (cm^2) 3.12 cm^2 1/6/2020  1:24 PM   Change in Wound Size % (l*w) -22.35 1/6/2020  1:24 PM   Wound Volume (cm^3) 1.56 cm^3 1/6/2020  1:24 PM   Wound Healing % -500 1/6/2020  1:24 PM   Post-Procedure Length (cm) 1.2 cm 12/23/2019  1:32 PM   Post-Procedure Width (cm) 2.3 cm 12/23/2019  1:32 PM   Post-Procedure Depth (cm) 0.8 cm 12/23/2019  1:32 PM   Post-Procedure Surface Area (cm^2) 2.76 cm^2 12/23/2019  1:32 PM   Post-Procedure Volume (cm^3) 2.21 cm^3 12/23/2019  1:32 PM   Wound Assessment Yellow;Pink 1/6/2020  1:24 PM   Drainage Amount Moderate 1/6/2020  1:24 PM   Drainage Description Yellow 1/6/2020  1:24 PM   Odor None 1/6/2020  1:24 PM   Laura-wound Assessment Pink; Intact 1/6/2020  1:24 PM   Culture Taken Yes 10/1/2019  1:55 PM   Number of days: 111     Percent of Wound/Ulcer Debrided: 100%    Total Surface Area Debrided:  3.12 sq cm     Estimated Blood Loss:  None  Hemostasis Achieved:  not needed    Procedural Pain:  4  / 10   Post Procedural Pain:  2 / 10     Response to treatment:  Well tolerated by patient. Plan:   Treatment Note please see attached Discharge Instructions    Written patient dismissal instructions given to patient and signed by patient or POA.                  Electronically signed by Ad Roman MD on 1/6/2020 at 1:40 PM

## 2020-01-27 ENCOUNTER — HOSPITAL ENCOUNTER (OUTPATIENT)
Dept: WOUND CARE | Age: 85
Discharge: HOME | End: 2020-01-27
Payer: MEDICARE

## 2020-01-27 VITALS
DIASTOLIC BLOOD PRESSURE: 72 MMHG | SYSTOLIC BLOOD PRESSURE: 110 MMHG | HEART RATE: 76 BPM | TEMPERATURE: 97.8 F | RESPIRATION RATE: 20 BRPM

## 2020-01-27 PROCEDURE — 11042 DBRDMT SUBQ TIS 1ST 20SQCM/<: CPT

## 2020-01-27 PROCEDURE — 11042 DBRDMT SUBQ TIS 1ST 20SQCM/<: CPT | Performed by: SURGERY

## 2020-01-27 RX ORDER — LIDOCAINE HYDROCHLORIDE 20 MG/ML
JELLY TOPICAL ONCE
Status: DISCONTINUED | OUTPATIENT
Start: 2020-01-27 | End: 2020-01-28 | Stop reason: HOSPADM

## 2020-01-27 RX ORDER — FUROSEMIDE 40 MG/1
40 TABLET ORAL 2 TIMES DAILY
COMMUNITY

## 2020-01-27 ASSESSMENT — PAIN SCALES - GENERAL: PAINLEVEL_OUTOF10: 0

## 2020-01-27 NOTE — PROGRESS NOTES
Wound Healing Center Followup Visit Note    Referring Physician : Daniela Alcala MD  [address] RECORD NUMBER:  [id number]  AGE: 81 y.o. GENDER: female  : 1935  EPISODE DATE:  2020    Subjective:     Chief Complaint   Patient presents with    Wound Check     right thigh      HISTORY of PRESENT ILLNESS HPI  Brandon Vilchis a 81 y. o. female who presents today for wound/ulcer evaluation. History of Wound Context:  The patient has had a wound of right which was first noted approximately 4 weeks ago. .  This has been treated with local debridement however she was found to have underlying fluid collection while in the hospital last week. She under excisional debridement with drainage of fluid collection. The patient has noted that the wound has been improving.  The patient has not had similar previous wounds in the past.       19 update - wound is filling in nicely. No more tunneling. Patient is using less and less aquacel rope.    - wound continues to heal. No signs of infection.    - Wound has contracted but continues to be slow to heal. No infection. Persistent edema   - Continues to contract. Less edema.   : wound small. Top layer of fibrinous exudate removed. : wound thi. Healthy base. Fibrin layer excisonal debrided     Pt is currently no longer on abx.     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  Moderate dilation of RA Moderate TR RV systolic pressure = 60 mm Hg     Bilateral sensorineural hearing loss 2015    Degenerative joint disease of sacroiliac region 3/29/2013    HTN (hypertension) TIME DAILY -PLEASE REORDER 1 BUSINESS DAY IN ADVANCE USING PHONE NUMBER ABOVE. 90 capsule 3     No current facility-administered medications on file prior to encounter. 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    Outpatient observation status Z04.9    HTN (hypertension) I10    Diverticulosis of colon K57.30    Degenerative joint disease of sacroiliac region M47.818    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 Performed by: Ad Roman MD    Consent obtained:  Yes    Time out taken:  Yes    Pain Control: Anesthetic  Anesthetic: 2% Lidocaine Gel Topical     Debridement:Excisional Debridement    Using curette the wound(s)/ulcer(s) was/were sharply debrided down through and including the removal of epidermis, dermis and subcutaneous tissue.         Devitalized Tissue Debrided:  fibrin to stimulate bleeding to promote healing, post debridement good bleeding base and wound edges noted    Pre Debridement Measurements:  Are located in the [place]  Documentation Flow Sheet    Wound/Ulcer #: 1    Post Debridement Measurements:  Wound/Ulcer Descriptions are Pre Debridement except measurements:    Wound 09/17/19 Thigh Right;Medial #1 Right Medial inner Thigh wound (Acquired August 2019) (Active)   Wound Image   9/17/2019  1:40 PM   Dressing Status Clean;Dry; Intact 1/27/2020  1:37 PM   Dressing Changed Changed/New 1/27/2020  1:37 PM   Dressing/Treatment Other (comment) 1/27/2020  1:37 PM   Wound Cleansed Rinsed/Irrigated with saline 1/27/2020  1:37 PM   Wound Length (cm) 1.2 cm 1/27/2020  1:12 PM   Wound Width (cm) 1.8 cm 1/27/2020  1:12 PM   Wound Depth (cm) 0.4 cm 1/27/2020  1:12 PM   Wound Surface Area (cm^2) 2.16 cm^2 1/27/2020  1:12 PM   Change in Wound Size % (l*w) 15.29 1/27/2020  1:12 PM   Wound Volume (cm^3) 0.86 cm^3 1/27/2020  1:12 PM   Wound Healing % -231 1/27/2020  1:12 PM   Post-Procedure Length (cm) 1.3 cm 1/27/2020  1:30 PM   Post-Procedure Width (cm) 1.9 cm 1/27/2020  1:30 PM   Post-Procedure Depth (cm) 0.4 cm 1/27/2020  1:30 PM   Post-Procedure Surface Area (cm^2) 2.47 cm^2 1/27/2020  1:30 PM   Post-Procedure Volume (cm^3) 0.99 cm^3 1/27/2020  1:30 PM   Wound Assessment Yellow;Pink 1/27/2020  1:12 PM   Drainage Amount Moderate 1/27/2020  1:12 PM   Drainage Description Yellow 1/27/2020  1:12 PM   Odor None 1/27/2020  1:12 PM   Laura-wound Assessment Pink; Intact 1/27/2020  1:12 PM   Culture Taken Yes 10/1/2019  1:55 PM   Number of days: 132     Percent of Wound/Ulcer Debrided: 100%    Total Surface Area Debrided:  2.5 sq cm     Estimated Blood Loss:  None  Hemostasis Achieved:  not needed    Procedural Pain:  3  / 10   Post Procedural Pain:  3 / 10     Response to treatment:  Well tolerated by patient. Plan:   Treatment Note please see attached Discharge Instructions    Written patient dismissal instructions given to patient and signed by patient or POA.          Discharge Instructions       Discharge Instructions      Visit [id number] Orders     Discharge condition: Stable     Assessment of pain at discharge: mild     Anesthetic used: 2% lidocaine     Discharge to: Home     Left via:Private automobile     Accompanied by: accompanied by self     ECF/HHA: [facility]      Dressing Orders: RT INNER THIGH: CLEANSE  WOUND WITH NORMAL STERILE SALINE,apply triple helex powder and wound gel INTO WOUND BED, COVER WITH DRY DRESSING AND SECURE. [facility].      Treatment Orders:  APPLY 1% CORTISONE CREAM SURROUNDING WOUND AREA AS NEEDED      [address] [facility] followup visit ____________4 weeks___DR Shemar Lopez ______________  (Please note your next appointment above and if you are unable to keep, kindly give a 24 hour notice.  Thank you.)     Physician signature:__________________________        If you experience any of the following, please call the [address] during business hours:     * Increase in Pain  * Temperature over 101  * Increase in drainage from your wound  * Drainage with a foul odor  * Bleeding  * Increase in swelling  * Need for compression bandage changes due to slippage, breakthrough drainage.     If you need medical attention outside of the business hours of the [address] please contact your PCP or go to the nearest emergency room.                                         Electronically signed by Michael Rodriguez MD on 1/27/2020 at 1:41 PM

## 2020-02-24 ENCOUNTER — HOSPITAL ENCOUNTER (OUTPATIENT)
Dept: WOUND CARE | Age: 85
Discharge: HOME | End: 2020-02-24
Payer: MEDICARE

## 2020-02-24 VITALS
HEIGHT: 55 IN | TEMPERATURE: 97.8 F | HEART RATE: 72 BPM | WEIGHT: 233 LBS | SYSTOLIC BLOOD PRESSURE: 112 MMHG | RESPIRATION RATE: 20 BRPM | DIASTOLIC BLOOD PRESSURE: 60 MMHG | BODY MASS INDEX: 53.92 KG/M2

## 2020-02-24 PROCEDURE — 11042 DBRDMT SUBQ TIS 1ST 20SQCM/<: CPT | Performed by: SURGERY

## 2020-02-24 PROCEDURE — 11042 DBRDMT SUBQ TIS 1ST 20SQCM/<: CPT

## 2020-02-24 RX ORDER — OMEPRAZOLE 20 MG/1
40 CAPSULE, DELAYED RELEASE ORAL DAILY
Status: ON HOLD | COMMUNITY
End: 2022-02-24 | Stop reason: HOSPADM

## 2020-02-24 RX ORDER — LIDOCAINE HYDROCHLORIDE 40 MG/ML
SOLUTION TOPICAL ONCE
Status: DISCONTINUED | OUTPATIENT
Start: 2020-02-24 | End: 2020-02-25 | Stop reason: HOSPADM

## 2020-02-24 NOTE — PLAN OF CARE
Problem: Wound:  Goal: Will show signs of wound healing; wound closure and no evidence of infection  Description  Will show signs of wound healing; wound closure and no evidence of infection  2/24/2020 1526 by Cristian Woody RN  Outcome: Ongoing  2/24/2020 1322 by Cristian Woody RN  Outcome: Ongoing

## 2020-02-24 NOTE — PROGRESS NOTES
ONE TIME DAILY -PLEASE REORDER 1 BUSINESS DAY IN ADVANCE USING PHONE NUMBER ABOVE. 90 capsule 3     No current facility-administered medications on file prior to encounter. REVIEW OF SYSTEMS See HPI    Objective:    /60   Pulse 72   Temp 97.8 °F (36.6 °C) (Oral)   Resp 20   Ht 4' 6\" (1.372 m)   Wt 233 lb (105.7 kg)   BMI 56.18 kg/m²   Wt Readings from Last 3 Encounters:   02/24/20 233 lb (105.7 kg)   01/06/20 233 lb (105.7 kg)   12/02/19 226 lb (102.5 kg)     PHYSICAL EXAM  CONSTITUTIONAL:   Awake, alert, cooperative   EYES:  lids and lashes normal   ENT: external ears and nose without lesions   NECK:  supple, symmetrical, trachea midline   SKIN:  Open wound Present    Assessment:     Problem List Items Addressed This Visit     Open thigh wound, right, initial encounter - Primary          Pre Debridement Measurements:  Are located in the [place]  Documentation Flow Sheet  Post Debridement Measurements:  Wound/Ulcer Descriptions are Pre Debridement except measurements:     Wound 09/17/19 Thigh Right;Medial #1 Right Medial inner Thigh wound (Acquired August 2019) (Active)   Wound Image   9/17/2019  1:40 PM   Dressing Status Clean;Dry; Intact 1/27/2020  1:37 PM   Dressing Changed Changed/New 1/27/2020  1:37 PM   Dressing/Treatment Other (comment) 1/27/2020  1:37 PM   Wound Cleansed Rinsed/Irrigated with saline 1/27/2020  1:37 PM   Wound Length (cm) 0.9 cm 2/24/2020  1:13 PM   Wound Width (cm) 1.4 cm 2/24/2020  1:13 PM   Wound Depth (cm) 0.2 cm 2/24/2020  1:13 PM   Wound Surface Area (cm^2) 1.26 cm^2 2/24/2020  1:13 PM   Change in Wound Size % (l*w) 50.59 2/24/2020  1:13 PM   Wound Volume (cm^3) 0.25 cm^3 2/24/2020  1:13 PM   Wound Healing % 4 2/24/2020  1:13 PM   Post-Procedure Length (cm) 0.9 cm 2/24/2020  1:33 PM   Post-Procedure Width (cm) 1.4 cm 2/24/2020  1:33 PM   Post-Procedure Depth (cm) 0.2 cm 2/24/2020  1:33 PM   Post-Procedure Surface Area (cm^2) 1.26 cm^2 2/24/2020  1:33 PM

## 2020-03-20 ENCOUNTER — HOSPITAL ENCOUNTER (OUTPATIENT)
Dept: CT IMAGING | Age: 85
Discharge: HOME | End: 2020-03-20
Payer: MEDICARE

## 2020-03-20 PROCEDURE — 6360000004 HC RX CONTRAST MEDICATION: Performed by: RADIOLOGY

## 2020-03-20 PROCEDURE — 74177 CT ABD & PELVIS W/CONTRAST: CPT

## 2020-03-20 RX ADMIN — IOPAMIDOL 80 ML: 755 INJECTION, SOLUTION INTRAVENOUS at 10:23

## 2020-03-23 ENCOUNTER — HOSPITAL ENCOUNTER (OUTPATIENT)
Dept: WOUND CARE | Age: 85
Discharge: HOME | End: 2020-03-23
Payer: MEDICARE

## 2020-04-06 ENCOUNTER — HOSPITAL ENCOUNTER (OUTPATIENT)
Dept: WOUND CARE | Age: 85
Discharge: HOME | End: 2020-04-06
Payer: MEDICARE

## 2020-04-06 VITALS
TEMPERATURE: 97.4 F | BODY MASS INDEX: 53.92 KG/M2 | RESPIRATION RATE: 18 BRPM | SYSTOLIC BLOOD PRESSURE: 130 MMHG | DIASTOLIC BLOOD PRESSURE: 70 MMHG | HEIGHT: 55 IN | HEART RATE: 88 BPM | WEIGHT: 233 LBS

## 2020-04-06 PROCEDURE — 11042 DBRDMT SUBQ TIS 1ST 20SQCM/<: CPT | Performed by: SURGERY

## 2020-04-06 PROCEDURE — 99213 OFFICE O/P EST LOW 20 MIN: CPT | Performed by: SURGERY

## 2020-04-06 PROCEDURE — 11042 DBRDMT SUBQ TIS 1ST 20SQCM/<: CPT

## 2020-04-06 RX ORDER — LIDOCAINE HYDROCHLORIDE 40 MG/ML
SOLUTION TOPICAL ONCE
Status: DISCONTINUED | OUTPATIENT
Start: 2020-04-06 | End: 2020-04-07 | Stop reason: HOSPADM

## 2020-04-06 ASSESSMENT — PAIN SCALES - GENERAL: PAINLEVEL_OUTOF10: 0

## 2020-04-06 ASSESSMENT — PAIN SCALES - WONG BAKER: WONGBAKER_NUMERICALRESPONSE: 0

## 2020-04-07 NOTE — PROGRESS NOTES
Degenerative joint disease of sacroiliac region 3/29/2013    HTN (hypertension) 1/19/2012    Morbid obesity with BMI of 50.0-59.9, adult [facility])      Past Surgical History:   Procedure Laterality Date    CARDIAC CATHETERIZATION  ~2005    CATARACT REMOVAL WITH IMPLANT Bilateral ~2007    CHOLECYSTECTOMY, OPEN  1995    PLEURAL CATHETER INSERTION Right 10/24/2019    EXCISIONAL DEBRIDEMENT RIGHT LEG performed by Paty Banda MD at [address] Bilateral 2008    TUBAL LIGATION  1966     Family History   Problem Relation Age of Onset    Cancer Maternal Grandmother         uterine    Heart Disease Mother         CAD    Diabetes Father         SON    Heart Disease Father     Hypertension Sister      Social History     Tobacco Use    Smoking status: Former Smoker    Smokeless tobacco: Never Used    Tobacco comment: social smoker quit 1980       Substance Use Topics    Alcohol use: No     Alcohol/week: 0.0 standard drinks    Drug use: No     Allergies   Allergen Reactions    Amoxicillin Hives    Iodine     Shellfish-Derived Products Hives     SEVERE HIVES WITH SHRIMP    Sodium Bicarbonate      Pt was throwing up while taking     Current Outpatient Medications on File Prior to Encounter   Medication Sig Dispense Refill    omeprazole (PRILOSEC) 20 MG delayed release capsule Take 40 mg by mouth daily      furosemide (LASIX) 40 MG tablet Take 40 mg by mouth 2 times daily      metoprolol tartrate (LOPRESSOR) 25 MG tablet Take 1 tablet by mouth 2 times daily 60 tablet 3    magnesium oxide (MAG-OX) 400 MG tablet Take 400 mg by mouth daily      ferrous sulfate 325 (65 Fe) MG tablet Take 325 mg by mouth daily (with breakfast)      allopurinol (ZYLOPRIM) 100 MG tablet Take 100 mg by mouth daily      warfarin (COUMADIN) 2.5 MG tablet Take 2.5 mg by mouth Indications: 2.5 Mg Monday thru Friday      warfarin (COUMADIN) 5 MG tablet Take 5 mg by mouth Indications: Saturday & Sunday's  omeprazole (PRILOSEC) 20 MG capsule TAKE 2 CAPSULES BY MOUTH ONE TIME DAILY -PLEASE REORDER 1 BUSINESS DAY IN ADVANCE USING PHONE NUMBER ABOVE. 90 capsule 3     No current facility-administered medications on file prior to encounter. REVIEW OF SYSTEMS See HPI    Objective:    /70   Pulse 88   Temp 97.4 °F (36.3 °C) (Oral)   Resp 18   Ht 4' 6\" (1.372 m)   Wt 233 lb (105.7 kg)   BMI 56.18 kg/m²   Wt Readings from Last 3 Encounters:   04/06/20 233 lb (105.7 kg)   02/24/20 233 lb (105.7 kg)   01/06/20 233 lb (105.7 kg)     PHYSICAL EXAM  CONSTITUTIONAL:   Awake, alert, cooperative   EYES:  lids and lashes normal   ENT: external ears and nose without lesions   NECK:  supple, symmetrical, trachea midline   SKIN:  Open wound Present    Assessment:     Problem List Items Addressed This Visit     Open thigh wound, right, initial encounter - Primary          Pre Debridement Measurements:  Are located in the [place]  Documentation Flow Sheet  Post Debridement Measurements:  Wound/Ulcer Descriptions are Pre Debridement except measurements:     Wound 09/17/19 Thigh Right;Medial #1 Right Medial inner Thigh wound (Acquired August 2019) (Active)   Wound Image   9/17/2019  1:40 PM   Dressing Status Clean;Dry; Intact 2/24/2020  1:57 PM   Dressing Changed Changed/New 2/24/2020  1:57 PM   Dressing/Treatment Hydrating gel;Dry dressing 4/6/2020  2:49 PM   Wound Cleansed Rinsed/Irrigated with saline 4/6/2020  2:49 PM   Wound Length (cm) 0.9 cm 4/6/2020  1:45 PM   Wound Width (cm) 1 cm 4/6/2020  1:45 PM   Wound Depth (cm) 0.2 cm 4/6/2020  1:45 PM   Wound Surface Area (cm^2) 0.9 cm^2 4/6/2020  1:45 PM   Change in Wound Size % (l*w) 64.71 4/6/2020  1:45 PM   Wound Volume (cm^3) 0.18 cm^3 4/6/2020  1:45 PM   Wound Healing % 31 4/6/2020  1:45 PM   Post-Procedure Length (cm) 0.9 cm 4/6/2020  2:34 PM   Post-Procedure Width (cm) 1 cm 4/6/2020  2:34 PM   Post-Procedure Depth (cm) 0.2 cm 4/6/2020  2:34 PM   Post-Procedure Surface Area (cm^2) 0.9 cm^2 4/6/2020  2:34 PM   Post-Procedure Volume (cm^3) 0.18 cm^3 4/6/2020  2:34 PM   Wound Assessment Pink;Yellow 4/6/2020  1:45 PM   Drainage Amount Small 4/6/2020  1:45 PM   Drainage Description Serous; Yellow 4/6/2020  1:45 PM   Odor None 4/6/2020  1:45 PM   Laura-wound Assessment Pink 4/6/2020  1:45 PM   Culture Taken Yes 10/1/2019  1:55 PM   Number of days: 202          Procedure Note  Indications:  Based on my examination of this patient's wound(s)/ulcer(s) today, debridement is required to promote healing and evaluate the wound base. Performed by: Mary Kate Garcia MD    Consent obtained:  Yes    Time out taken:  Yes    Pain Control:       Debridement:Excisional Debridement    Using curette the wound(s)/ulcer(s) was/were sharply debrided down through and including the removal of subcutaneous tissue. Devitalized Tissue Debrided:  biofilm to stimulate bleeding to promote healing, post debridement good bleeding base and wound edges noted    Wound/Ulcer #: 1    Percent of Wound/Ulcer Debrided: 100%    Total Surface Area Debrided:  1.26 sq cm     Estimated Blood Loss:  None  Hemostasis Achieved:  not needed    Procedural Pain:  3  / 10   Post Procedural Pain:  4 / 10     Response to treatment:  Well tolerated by patient. Plan:   Treatment Note please see attached Discharge Instructions    Written patient dismissal instructions given to patient and signed by patient or POA. Discharge Instructions       Discharge Instructions      Visit Discharge/Physician Orders     Discharge condition: Stable     Assessment of pain at discharge: mild     Anesthetic used: 2% lidocaine     Discharge to: Home     Left via:Private automobile     Accompanied by: accompanied by self     ECF/HHA: [facility]      Dressing Orders: RT INNER THIGH: CLEANSE  WOUND WITH NORMAL STERILE SALINE,apply triple helex powder and wound gel INTO WOUND BED, COVER WITH DRY DRESSING AND SECURE. [facility]

## 2020-05-04 ENCOUNTER — HOSPITAL ENCOUNTER (OUTPATIENT)
Dept: WOUND CARE | Age: 85
Discharge: HOME | End: 2020-05-04
Payer: MEDICARE

## 2020-05-04 VITALS
HEART RATE: 72 BPM | SYSTOLIC BLOOD PRESSURE: 104 MMHG | WEIGHT: 210 LBS | HEIGHT: 55 IN | TEMPERATURE: 97.4 F | BODY MASS INDEX: 48.6 KG/M2 | RESPIRATION RATE: 18 BRPM | DIASTOLIC BLOOD PRESSURE: 60 MMHG

## 2020-05-04 PROCEDURE — 99213 OFFICE O/P EST LOW 20 MIN: CPT | Performed by: SURGERY

## 2020-05-04 PROCEDURE — 11042 DBRDMT SUBQ TIS 1ST 20SQCM/<: CPT | Performed by: SURGERY

## 2020-05-04 PROCEDURE — 11042 DBRDMT SUBQ TIS 1ST 20SQCM/<: CPT

## 2020-05-04 RX ORDER — LIDOCAINE HYDROCHLORIDE 40 MG/ML
SOLUTION TOPICAL ONCE
Status: DISCONTINUED | OUTPATIENT
Start: 2020-05-04 | End: 2020-05-05 | Stop reason: HOSPADM

## 2020-05-04 NOTE — DISCHARGE INSTR - COC
Risk of Unplanned Readmission:        0           Discharging to Facility/ Agency   · Name:   · Address:  · Phone:  · Fax:    Dialysis Facility (if applicable)   · Name:  · Address:  · Dialysis Schedule:  · Phone:  · Fax:    / signature: {Esignature:[id number]}    PHYSICIAN SECTION    Prognosis: {Prognosis:9096683660}    Condition at Discharge: Lillian Juan Patient Condition:757821432}    Rehab Potential (if transferring to Rehab): {Prognosis:8141473129}    Recommended Labs or Other Treatments After Discharge: ***    Physician Certification: I certify the above information and transfer of Mick De Jesus  is necessary for the continuing treatment of the diagnosis listed and that she requires {Admit to Appropriate Level of Care:36181} for {GREATER/LESS:675845125} 30 days.      Update Admission H&P: {CHP DME Changes in LTGXU:853037069}    PHYSICIAN SIGNATURE:  {Esignature:[id number]}

## 2020-05-04 NOTE — PROGRESS NOTES
 Bilateral sensorineural hearing loss 1/29/2015    Degenerative joint disease of sacroiliac region 3/29/2013    HTN (hypertension) 1/19/2012    Morbid obesity with BMI of 50.0-59.9, adult [facility])      Past Surgical History:   Procedure Laterality Date    CARDIAC CATHETERIZATION  ~2005    CATARACT REMOVAL WITH IMPLANT Bilateral ~2007    CHOLECYSTECTOMY, OPEN  1995    PLEURAL CATHETER INSERTION Right 10/24/2019    EXCISIONAL DEBRIDEMENT RIGHT LEG performed by Brianna Delgado MD at [address] [facility] Bilateral 2008    TUBAL LIGATION  1966     Family History   Problem Relation Age of Onset    Cancer Maternal Grandmother         uterine    Heart Disease Mother         CAD    Diabetes Father         SON    Heart Disease Father     Hypertension Sister      Social History     Tobacco Use    Smoking status: Former Smoker    Smokeless tobacco: Never Used    Tobacco comment: social smoker quit 1980       Substance Use Topics    Alcohol use: No     Alcohol/week: 0.0 standard drinks    Drug use: No     Allergies   Allergen Reactions    Amoxicillin Hives    Iodine     Shellfish-Derived Products Hives     SEVERE HIVES WITH SHRIMP    Sodium Bicarbonate      Pt was throwing up while taking     Current Outpatient Medications on File Prior to Encounter   Medication Sig Dispense Refill    omeprazole (PRILOSEC) 20 MG delayed release capsule Take 40 mg by mouth daily      furosemide (LASIX) 40 MG tablet Take 40 mg by mouth 2 times daily      metoprolol tartrate (LOPRESSOR) 25 MG tablet Take 1 tablet by mouth 2 times daily 60 tablet 3    magnesium oxide (MAG-OX) 400 MG tablet Take 400 mg by mouth daily      ferrous sulfate 325 (65 Fe) MG tablet Take 325 mg by mouth daily (with breakfast)      allopurinol (ZYLOPRIM) 100 MG tablet Take 100 mg by mouth daily      warfarin (COUMADIN) 2.5 MG tablet Take 2.5 mg by mouth Indications: 2.5 Mg Monday thru Friday      warfarin (COUMADIN) 5 MG tablet Take 5 mg by mouth Indications: Saturday & Sunday's      omeprazole (PRILOSEC) 20 MG capsule TAKE 2 CAPSULES BY MOUTH ONE TIME DAILY -PLEASE REORDER 1 BUSINESS DAY IN ADVANCE USING PHONE NUMBER ABOVE. 90 capsule 3     No current facility-administered medications on file prior to encounter. REVIEW OF SYSTEMS See HPI    Objective:    /60   Pulse 72   Temp 97.4 °F (36.3 °C) (Oral)   Resp 18   Ht 4' 6\" (1.372 m)   Wt 210 lb (95.3 kg)   BMI 50.63 kg/m²   Wt Readings from Last 3 Encounters:   05/04/20 210 lb (95.3 kg)   04/06/20 233 lb (105.7 kg)   02/24/20 233 lb (105.7 kg)     PHYSICAL EXAM  CONSTITUTIONAL:   Awake, alert, cooperative   EYES:  lids and lashes normal   ENT: external ears and nose without lesions   NECK:  supple, symmetrical, trachea midline   SKIN:  Open wound Present    Assessment:     Problem List Items Addressed This Visit     Open thigh wound, right, initial encounter - Primary          Pre Debridement Measurements:  Are located in the [place]  Documentation Flow Sheet  Post Debridement Measurements:  Wound/Ulcer Descriptions are Pre Debridement except measurements:     Wound 09/17/19 Thigh Right;Medial #1 Right Medial inner Thigh wound (Acquired August 2019) (Active)   Wound Image   9/17/2019  1:40 PM   Dressing Status Clean;Dry; Intact 2/24/2020  1:57 PM   Dressing Changed Changed/New 2/24/2020  1:57 PM   Dressing/Treatment Hydrating gel;Dry dressing 4/6/2020  2:49 PM   Wound Cleansed Rinsed/Irrigated with saline 4/6/2020  2:49 PM   Wound Length (cm) 0.4 cm 5/4/2020  1:41 PM   Wound Width (cm) 0.3 cm 5/4/2020  1:41 PM   Wound Depth (cm) 0.2 cm 5/4/2020  1:41 PM   Wound Surface Area (cm^2) 0.12 cm^2 5/4/2020  1:41 PM   Change in Wound Size % (l*w) 95.29 5/4/2020  1:41 PM   Wound Volume (cm^3) 0.02 cm^3 5/4/2020  1:41 PM   Wound Healing % 92 5/4/2020  1:41 PM   Post-Procedure Length (cm) 0.4 cm 5/4/2020  1:53 PM   Post-Procedure Width (cm) 0.3 cm 5/4/2020  1:53 PM

## 2020-06-15 ENCOUNTER — HOSPITAL ENCOUNTER (OUTPATIENT)
Dept: WOUND CARE | Age: 85
Discharge: HOME | End: 2020-06-15
Payer: MEDICARE

## 2020-06-15 VITALS
HEART RATE: 70 BPM | DIASTOLIC BLOOD PRESSURE: 60 MMHG | TEMPERATURE: 97.8 F | SYSTOLIC BLOOD PRESSURE: 110 MMHG | RESPIRATION RATE: 20 BRPM

## 2020-06-15 PROCEDURE — 99213 OFFICE O/P EST LOW 20 MIN: CPT

## 2020-06-15 PROCEDURE — 99213 OFFICE O/P EST LOW 20 MIN: CPT | Performed by: SURGERY

## 2020-06-15 NOTE — PROGRESS NOTES
warfarin (COUMADIN) 5 MG tablet Take 5 mg by mouth Indications: Saturday & Sunday's      omeprazole (PRILOSEC) 20 MG capsule TAKE 2 CAPSULES BY MOUTH ONE TIME DAILY -PLEASE REORDER 1 BUSINESS DAY IN ADVANCE USING PHONE NUMBER ABOVE. 90 capsule 3     No current facility-administered medications on file prior to encounter. REVIEW OF SYSTEMS See HPI    Objective:    /60   Pulse 70   Temp 97.8 °F (36.6 °C) (Oral)   Resp 20   Wt Readings from Last 3 Encounters:   05/04/20 210 lb (95.3 kg)   04/06/20 233 lb (105.7 kg)   02/24/20 233 lb (105.7 kg)     PHYSICAL EXAM  CONSTITUTIONAL:   Awake, alert, cooperative   EYES:  lids and lashes normal   ENT: external ears and nose without lesions   NECK:  supple, symmetrical, trachea midline   SKIN:  Open wound Present    Assessment:     Problem List Items Addressed This Visit     Open thigh wound, right, initial encounter - Primary          Pre Debridement Measurements:  Are located in the [place]  Documentation Flow Sheet  Post Debridement Measurements:  Wound/Ulcer Descriptions are Pre Debridement except measurements:     Wound 09/17/19 Thigh Right;Medial #1 Right Medial inner Thigh wound (Acquired August 2019) (Active)   Wound Image   9/17/2019  1:40 PM   Dressing Status Clean;Dry; Intact 2/24/2020  1:57 PM   Dressing Changed Changed/New 2/24/2020  1:57 PM   Dressing/Treatment Hydrating gel;Dry dressing 5/4/2020  3:38 PM   Wound Cleansed Rinsed/Irrigated with saline 5/4/2020  3:38 PM   Wound Length (cm) 0.4 cm 5/4/2020  1:41 PM   Wound Width (cm) 0.3 cm 5/4/2020  1:41 PM   Wound Depth (cm) 0.2 cm 5/4/2020  1:41 PM   Wound Surface Area (cm^2) 0.12 cm^2 5/4/2020  1:41 PM   Change in Wound Size % (l*w) 95.29 5/4/2020  1:41 PM   Wound Volume (cm^3) 0.02 cm^3 5/4/2020  1:41 PM   Wound Healing % 92 5/4/2020  1:41 PM   Post-Procedure Length (cm) 0.4 cm 5/4/2020  1:53 PM   Post-Procedure Width (cm) 0.3 cm 5/4/2020  1:53 PM   Post-Procedure Depth (cm) 0.2 cm 5/4/2020 1:53 PM   Post-Procedure Surface Area (cm^2) 0.12 cm^2 5/4/2020  1:53 PM   Post-Procedure Volume (cm^3) 0.02 cm^3 5/4/2020  1:53 PM   Wound Assessment Pink;Yellow 5/4/2020  1:41 PM   Drainage Amount Scant 5/4/2020  1:41 PM   Drainage Description Yellow 5/4/2020  1:41 PM   Odor None 5/4/2020  1:41 PM   Laura-wound Assessment Intact 5/4/2020  1:41 PM   Culture Taken Yes 10/1/2019  1:55 PM   Number of days: 272          Procedure Note  Indications:  Based on my examination of this patient's wound(s)/ulcer(s) today, debridement is not required to promote healing and evaluate the wound base. Plan:   Treatment Note please see attached Discharge Instructions    Written patient dismissal instructions given to patient and signed by patient or POA. Discharge Instructions       Discharge Instructions      Visit Discharge/Physician Orders     Discharge condition: Stable     Assessment of pain at discharge: mild     Anesthetic used: 2% lidocaine     Discharge to: Home     Left via:Private automobile     Accompanied by: accompanied by self     ECF/[facility]: [facility]      Dressing Orders: RT INNER THIGH: CLEANSE  WOUND WITH NORMAL STERILE SALINE,apply triple helix powder and wound gel , COVER WITH DRY DRESSING AND SECURE.  CHANGE DAILY.      Treatment Orders:  APPLY 1% CORTISONE CREAM SURROUNDING WOUND AREA AS NEEDED      [facility] followup visit ____________4 weeks___DR Santiago Roy ______________  (Please note your next appointment above and if you are unable to keep, kindly give a 24 hour notice.  Thank you.)     Physician signature:__________________________        If you experience any of the following, please call the [address] [place] [address] during business hours:     * Increase in Pain  * Temperature over 101  * Increase in drainage from your wound  * Drainage with a foul odor  * Bleeding  * Increase in swelling  * Need for compression bandage changes due to slippage, breakthrough drainage.     If you need medical attention outside of the business hours of the [address] please contact your PCP or go to the nearest emergency room.                                           Electronically signed by Mannie Maier MD on 6/15/2020 at 1:43 PM

## 2021-01-02 ENCOUNTER — HOSPITAL ENCOUNTER (EMERGENCY)
Age: 86
Discharge: HOME | End: 2021-01-02
Attending: EMERGENCY MEDICINE
Payer: MEDICARE

## 2021-01-02 ENCOUNTER — APPOINTMENT (OUTPATIENT)
Dept: ULTRASOUND IMAGING | Age: 86
End: 2021-01-02
Payer: MEDICARE

## 2021-01-02 VITALS
TEMPERATURE: 97.5 F | HEART RATE: 86 BPM | RESPIRATION RATE: 20 BRPM | OXYGEN SATURATION: 98 % | DIASTOLIC BLOOD PRESSURE: 68 MMHG | SYSTOLIC BLOOD PRESSURE: 144 MMHG

## 2021-01-02 DIAGNOSIS — R09.89 JVD (JUGULAR VENOUS DISTENSION): Primary | ICD-10-CM

## 2021-01-02 LAB
ALBUMIN SERPL-MCNC: 4 G/DL (ref 3.5–5.2)
ALP BLD-CCNC: 52 U/L (ref 35–104)
ALT SERPL-CCNC: 14 U/L (ref 0–32)
ANION GAP SERPL CALCULATED.3IONS-SCNC: 9 MMOL/L (ref 7–16)
AST SERPL-CCNC: 25 U/L (ref 0–31)
BASOPHILS ABSOLUTE: 0.09 E9/L (ref 0–0.2)
BASOPHILS RELATIVE PERCENT: 1.2 % (ref 0–2)
BILIRUB SERPL-MCNC: 1.3 MG/DL (ref 0–1.2)
BUN BLDV-MCNC: 31 MG/DL (ref 8–23)
CALCIUM SERPL-MCNC: 9.7 MG/DL (ref 8.6–10.2)
CHLORIDE BLD-SCNC: 101 MMOL/L (ref 98–107)
CO2: 25 MMOL/L (ref 22–29)
CREAT SERPL-MCNC: 1.2 MG/DL (ref 0.5–1)
EOSINOPHILS ABSOLUTE: 0.11 E9/L (ref 0.05–0.5)
EOSINOPHILS RELATIVE PERCENT: 1.5 % (ref 0–6)
GFR AFRICAN AMERICAN: 52
GFR NON-AFRICAN AMERICAN: 43 ML/MIN/1.73
GLUCOSE BLD-MCNC: 87 MG/DL (ref 74–99)
HCT VFR BLD CALC: 39 % (ref 34–48)
HEMOGLOBIN: 11.5 G/DL (ref 11.5–15.5)
IMMATURE GRANULOCYTES #: 0.02 E9/L
IMMATURE GRANULOCYTES %: 0.3 % (ref 0–5)
LYMPHOCYTES ABSOLUTE: 1.25 E9/L (ref 1.5–4)
LYMPHOCYTES RELATIVE PERCENT: 16.8 % (ref 20–42)
MCH RBC QN AUTO: 27.3 PG (ref 26–35)
MCHC RBC AUTO-ENTMCNC: 29.5 % (ref 32–34.5)
MCV RBC AUTO: 92.6 FL (ref 80–99.9)
MONOCYTES ABSOLUTE: 0.42 E9/L (ref 0.1–0.95)
MONOCYTES RELATIVE PERCENT: 5.6 % (ref 2–12)
NEUTROPHILS ABSOLUTE: 5.56 E9/L (ref 1.8–7.3)
NEUTROPHILS RELATIVE PERCENT: 74.6 % (ref 43–80)
PDW BLD-RTO: 16.9 FL (ref 11.5–15)
PLATELET # BLD: 235 E9/L (ref 130–450)
PMV BLD AUTO: 10.4 FL (ref 7–12)
POTASSIUM SERPL-SCNC: 4.4 MMOL/L (ref 3.5–5)
PRO-BNP: 5119 PG/ML (ref 0–450)
RBC # BLD: 4.21 E12/L (ref 3.5–5.5)
SODIUM BLD-SCNC: 135 MMOL/L (ref 132–146)
TOTAL PROTEIN: 7.7 G/DL (ref 6.4–8.3)
TROPONIN: <0.01 NG/ML (ref 0–0.03)
WBC # BLD: 7.5 E9/L (ref 4.5–11.5)

## 2021-01-02 PROCEDURE — 99283 EMERGENCY DEPT VISIT LOW MDM: CPT

## 2021-01-02 PROCEDURE — 80053 COMPREHEN METABOLIC PANEL: CPT

## 2021-01-02 PROCEDURE — 85025 COMPLETE CBC W/AUTO DIFF WBC: CPT

## 2021-01-02 PROCEDURE — 93971 EXTREMITY STUDY: CPT

## 2021-01-02 PROCEDURE — 83880 ASSAY OF NATRIURETIC PEPTIDE: CPT

## 2021-01-02 PROCEDURE — 84484 ASSAY OF TROPONIN QUANT: CPT

## 2021-01-02 PROCEDURE — 93005 ELECTROCARDIOGRAM TRACING: CPT | Performed by: EMERGENCY MEDICINE

## 2021-01-02 ASSESSMENT — ENCOUNTER SYMPTOMS
PHOTOPHOBIA: 0
COUGH: 0
SINUS PRESSURE: 0
SINUS PAIN: 0
WHEEZING: 0
BACK PAIN: 0
RHINORRHEA: 0
VOMITING: 0
ABDOMINAL PAIN: 0
SHORTNESS OF BREATH: 0
DIARRHEA: 0
SORE THROAT: 0
CONSTIPATION: 0
NAUSEA: 0

## 2021-01-02 NOTE — ED PROVIDER NOTES
Patient is an 71-year-old female who presents the chief complaint of right-sided neck pain and bulging vein that she noticed today. Patient that she woke up and she noticed that she had a bulging right neck vein that was pulsatile, she was concerned for possible aneurysm or clot. She did have some tingling of her right face, but states that she is not sure if that was due to her being concerned about the bulging vein. The patient states that she is always short of breath, is not worse. She also has chronic leg edema that is also not worse. She is on a water pill daily, but states it does not do very much for her leg swelling. Denies any lightheadedness, dizziness, chest pain, abdominal pain, nausea, vomiting. No new medications or recent illnesses. The history is provided by the patient. No  was used. Review of Systems   Constitutional: Negative for chills, fatigue and fever. HENT: Negative for congestion, rhinorrhea, sinus pressure, sinus pain, sneezing and sore throat. Eyes: Negative for photophobia and visual disturbance. Respiratory: Negative for cough, shortness of breath and wheezing. Cardiovascular: Negative for chest pain and palpitations. Gastrointestinal: Negative for abdominal pain, constipation, diarrhea, nausea and vomiting. Genitourinary: Negative for dysuria, frequency and hematuria. Musculoskeletal: Positive for neck pain (bulging neck vein). Negative for back pain. Skin: Negative for rash and wound. Neurological: Negative for dizziness, light-headedness and headaches. Psychiatric/Behavioral: Negative for agitation, behavioral problems and confusion. Physical Exam  Constitutional:       General: She is not in acute distress. Appearance: She is not toxic-appearing. HENT:      Head: Normocephalic. Mouth/Throat:      Mouth: Mucous membranes are moist.   Eyes:      General: No scleral icterus.      Pupils: Pupils are equal, round, and reactive to light. Neck:      Musculoskeletal: Normal range of motion. No neck rigidity. Vascular: JVD present. No hepatojugular reflux. Comments: Patient does have JVD of the right external jugular vein. No hepatojugular reflex. No pulsatile nature, no bruit. Cardiovascular:      Rate and Rhythm: Normal rate. Rhythm irregular. Heart sounds: No murmur. No gallop. Pulmonary:      Effort: No respiratory distress. Breath sounds: Normal breath sounds. No wheezing. Comments: Clear breath sounds in all lung fields. No acute respiratory stress. Pulse ox 98% on room air. Abdominal:      General: There is no distension. Palpations: Abdomen is soft. Tenderness: There is no abdominal tenderness. Musculoskeletal: Normal range of motion. General: No swelling or deformity. Comments: Chronic lower extremity edema present, nonpitting. Lymphadenopathy:      Cervical: No cervical adenopathy. Skin:     General: Skin is warm. Coloration: Skin is not jaundiced. Findings: No bruising. Neurological:      Mental Status: She is alert and oriented to person, place, and time. Cranial Nerves: No cranial nerve deficit. Motor: No weakness. Comments: Patient is awake, alert, oriented x3. No focal neurological deficit. Psychiatric:         Mood and Affect: Mood normal.         Thought Content: Thought content normal.          Procedures     MDM  Number of Diagnoses or Management Options  JVD (jugular venous distension)  Diagnosis management comments: Patient is a 26-year-old female who presented with a chief complaint of bulge of her right neck, concern for possible clot of her carotid artery. On exam I did look like she had JVD, no hepatojugular reflex. He does have history of edema and leg swelling. She was not having any worsening shortness of breath on exam, her sats were 98% on room air.   I did get an ultrasound of the right upper extremity which did not show any evidence of aneurysm and no thrombus. Normal carotid artery. I did have a discussion with the patient about decreasing fluid by elevating her legs, she states that she cannot do this because she not lay on her back for very long. She does have a chair that lifts up her legs, she is not able to wear compression stockings because they are too long for her. She is on Lasix currently and states that they do not do very much for her leg swelling. The patient's BNP is elevated, but she has no other evidence of failure at this time on exam.  She is not requiring any supplemental oxygen. I did advise her to follow with her PCP to maybe switch her water pill. The patient is in no acute distress. The patient will follow up with her PCP, she understands has no further questions. ED Course as of Jan 02 1554   Sat Jan 02, 2021 1536 Patient was updated on her imaging and lab results. I did discuss that she likely has some additional volume which is contributing to enlarging external jugular vein. There is no evidence of any aneurysm or thrombus. The patient otherwise has no complaints. The patient has tried multiple options for like compressions and elevation but they are not improving her symptoms. She is on Lasix. She will follow up with her PCP and if she has any worsening symptoms she will return. No further questions. [MS]      ED Course User Index  [MS] Jeffery Salcido DO        ED Course as of Jan 02 1554   Sat Jan 02, 2021 1536 Patient was updated on her imaging and lab results. I did discuss that she likely has some additional volume which is contributing to enlarging external jugular vein. There is no evidence of any aneurysm or thrombus. The patient otherwise has no complaints. The patient has tried multiple options for like compressions and elevation but they are not improving her symptoms. She is on Lasix.   She will follow up with her PCP and if she has any worsening symptoms she will return. No further questions. 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 Past Surgical History:  has a past surgical history that includes Tubal ligation (1966); Cholecystectomy, open (1995); Total knee arthroplasty (Bilateral, 2008); Cataract removal with implant (Bilateral, ~2007); Cardiac catheterization (~2005); and Leg biopsy excision (Right, 10/24/2019). Social History:  reports that she has quit smoking. She has never used smokeless tobacco. She reports that she does not drink alcohol or use drugs. Family History: family history includes Cancer in her maternal grandmother; Diabetes in her father; Heart Disease in her father and mother; Hypertension in her sister. The patients home medications have been reviewed.     Allergies: Amoxicillin, Iodine, Shellfish-derived products, and Sodium bicarbonate    -------------------------------------------------- RESULTS -------------------------------------------------  Labs:  Results for orders placed or performed during the hospital encounter of 01/02/21   CBC Auto Differential   Result Value Ref Range    WBC 7.5 4.5 - 11.5 E9/L    RBC 4.21 3.50 - 5.50 E12/L    Hemoglobin 11.5 11.5 - 15.5 g/dL    Hematocrit 39.0 34.0 - 48.0 %    MCV 92.6 80.0 - 99.9 fL    MCH 27.3 26.0 - 35.0 pg    MCHC 29.5 (L) 32.0 - 34.5 %    RDW 16.9 (H) 11.5 - 15.0 fL    Platelets 728 189 - 606 E9/L    MPV 10.4 7.0 - 12.0 fL    Neutrophils % 74.6 43.0 - 80.0 %    Immature Granulocytes % 0.3 0.0 - 5.0 %    Lymphocytes % 16.8 (L) 20.0 - 42.0 % Monocytes % 5.6 2.0 - 12.0 %    Eosinophils % 1.5 0.0 - 6.0 %    Basophils % 1.2 0.0 - 2.0 %    Neutrophils Absolute 5.56 1.80 - 7.30 E9/L    Immature Granulocytes # 0.02 E9/L    Lymphocytes Absolute 1.25 (L) 1.50 - 4.00 E9/L    Monocytes Absolute 0.42 0.10 - 0.95 E9/L    Eosinophils Absolute 0.11 0.05 - 0.50 E9/L    Basophils Absolute 0.09 0.00 - 0.20 E9/L   Comprehensive metabolic panel   Result Value Ref Range    Sodium 135 132 - 146 mmol/L    Potassium 4.4 3.5 - 5.0 mmol/L    Chloride 101 98 - 107 mmol/L    CO2 25 22 - 29 mmol/L    Anion Gap 9 7 - 16 mmol/L    Glucose 87 74 - 99 mg/dL    BUN 31 (H) 8 - 23 mg/dL    CREATININE 1.2 (H) 0.5 - 1.0 mg/dL    GFR Non-African American 43 >=60 mL/min/1.73    GFR African American 52     Calcium 9.7 8.6 - 10.2 mg/dL    Total Protein 7.7 6.4 - 8.3 g/dL    Alb 4.0 3.5 - 5.2 g/dL    Total Bilirubin 1.3 (H) 0.0 - 1.2 mg/dL    Alkaline Phosphatase 52 35 - 104 U/L    ALT 14 0 - 32 U/L    AST 25 0 - 31 U/L   Brain Natriuretic Peptide   Result Value Ref Range    Pro-BNP 5,119 (H) 0 - 450 pg/mL   Troponin   Result Value Ref Range    Troponin <0.01 0.00 - 0.03 ng/mL   EKG 12 Lead   Result Value Ref Range    Ventricular Rate 63 BPM    Atrial Rate 312 BPM    QRS Duration 88 ms    Q-T Interval 402 ms    QTc Calculation (Bazett) 411 ms    R Axis 44 degrees    T Axis 19 degrees       Radiology:  US DUP UPPER EXTREMITY RIGHT VENOUS   Final Result   1. Dilated venous structure seen within the right supraclavicular region   demonstrating venous flow on the color Doppler images. Differential includes   a AV fistula or possible varicose vein. No appreciable mass is noted. Further evaluation with nonemergent CTA  is recommended. Jennifer Martinez ------------------------- NURSING NOTES AND VITALS REVIEWED ---------------------------  Date / Time Roomed:  1/2/2021 11:44 AM  ED Bed Assignment:  23/23    The nursing notes within the ED encounter and vital signs as below have been reviewed.    BP (!) 153/65   Pulse 78   Temp 97.5 °F (36.4 °C)   Resp 18   SpO2 97%   Oxygen Saturation Interpretation: Normal    EKG: This EKG is signed and interpreted by me. Rate: 63  Rhythm: Atrial fibrillation  Interpretation: Atrial fibrillation, no acute ischemic changes seen. ------------------------------------------ PROGRESS NOTES ------------------------------------------  I have spoken with the patient and discussed todays results, in addition to providing specific details for the plan of care and counseling regarding the diagnosis and prognosis. Their questions are answered at this time and they are agreeable with the plan. I discussed at length with them reasons for immediate return here for re evaluation. They will followup with primary care by calling their office tomorrow. --------------------------------- ADDITIONAL PROVIDER NOTES ---------------------------------  At this time the patient is without objective evidence of an acute process requiring hospitalization or inpatient management. They have remained hemodynamically stable throughout their entire ED visit and are stable for discharge with outpatient follow-up. The plan has been discussed in detail and they are aware of the specific conditions for emergent return, as well as the importance of follow-up. New Prescriptions    No medications on file       Diagnosis:  1. JVD (jugular venous distension)        Disposition:  Patient's disposition: Discharge to home  Patient's condition is stable.           Jeffery Salcido DO  01/02/21 2033

## 2021-01-03 LAB
EKG ATRIAL RATE: 312 BPM
EKG Q-T INTERVAL: 402 MS
EKG QRS DURATION: 88 MS
EKG QTC CALCULATION (BAZETT): 411 MS
EKG R AXIS: 44 DEGREES
EKG T AXIS: 19 DEGREES
EKG VENTRICULAR RATE: 63 BPM

## 2022-02-19 ENCOUNTER — HOSPITAL ENCOUNTER (INPATIENT)
Age: 87
LOS: 5 days | Discharge: LEFT BEFORE DISCHARGE | DRG: 378 | End: 2022-02-24
Attending: EMERGENCY MEDICINE | Admitting: INTERNAL MEDICINE
Payer: MEDICARE

## 2022-02-19 DIAGNOSIS — K92.2 GASTROINTESTINAL HEMORRHAGE, UNSPECIFIED GASTROINTESTINAL HEMORRHAGE TYPE: Primary | ICD-10-CM

## 2022-02-19 PROBLEM — D62 ACUTE BLOOD LOSS ANEMIA: Status: ACTIVE | Noted: 2022-02-19

## 2022-02-19 PROBLEM — E87.6 HYPOKALEMIA: Status: ACTIVE | Noted: 2022-02-19

## 2022-02-19 LAB
ABO/RH: NORMAL
ALBUMIN SERPL-MCNC: 4 G/DL (ref 3.5–5.2)
ALP BLD-CCNC: 42 U/L (ref 35–104)
ALT SERPL-CCNC: 6 U/L (ref 0–32)
ANION GAP SERPL CALCULATED.3IONS-SCNC: 14 MMOL/L (ref 7–16)
ANISOCYTOSIS: ABNORMAL
ANTIBODY SCREEN: NORMAL
AST SERPL-CCNC: 17 U/L (ref 0–31)
BASOPHILS ABSOLUTE: 0 E9/L (ref 0–0.2)
BASOPHILS RELATIVE PERCENT: 0.8 % (ref 0–2)
BILIRUB SERPL-MCNC: 0.6 MG/DL (ref 0–1.2)
BLOOD BANK DISPENSE STATUS: NORMAL
BLOOD BANK PRODUCT CODE: NORMAL
BPU ID: NORMAL
BUN BLDV-MCNC: 74 MG/DL (ref 6–23)
CALCIUM SERPL-MCNC: 9.9 MG/DL (ref 8.6–10.2)
CHLORIDE BLD-SCNC: 94 MMOL/L (ref 98–107)
CO2: 30 MMOL/L (ref 22–29)
CREAT SERPL-MCNC: 1.6 MG/DL (ref 0.5–1)
DESCRIPTION BLOOD BANK: NORMAL
EOSINOPHILS ABSOLUTE: 0 E9/L (ref 0.05–0.5)
EOSINOPHILS RELATIVE PERCENT: 0.7 % (ref 0–6)
GFR AFRICAN AMERICAN: 37
GFR NON-AFRICAN AMERICAN: 31 ML/MIN/1.73
GLUCOSE BLD-MCNC: 106 MG/DL (ref 74–99)
HCT VFR BLD CALC: 22.8 % (ref 34–48)
HCT VFR BLD CALC: 23.8 % (ref 34–48)
HEMOGLOBIN: 6.6 G/DL (ref 11.5–15.5)
HEMOGLOBIN: 6.7 G/DL (ref 11.5–15.5)
HYPOCHROMIA: ABNORMAL
INR BLD: 1.9
LACTIC ACID: 2.3 MMOL/L (ref 0.5–2.2)
LIPASE: 57 U/L (ref 13–60)
LYMPHOCYTES ABSOLUTE: 1.02 E9/L (ref 1.5–4)
LYMPHOCYTES RELATIVE PERCENT: 8.8 % (ref 20–42)
MAGNESIUM: 2.9 MG/DL (ref 1.6–2.6)
MCH RBC QN AUTO: 23.5 PG (ref 26–35)
MCHC RBC AUTO-ENTMCNC: 28.2 % (ref 32–34.5)
MCV RBC AUTO: 83.5 FL (ref 80–99.9)
MONOCYTES ABSOLUTE: 0.79 E9/L (ref 0.1–0.95)
MONOCYTES RELATIVE PERCENT: 7 % (ref 2–12)
NEUTROPHILS ABSOLUTE: 9.49 E9/L (ref 1.8–7.3)
NEUTROPHILS RELATIVE PERCENT: 84.2 % (ref 43–80)
PDW BLD-RTO: 18.1 FL (ref 11.5–15)
PLATELET # BLD: 411 E9/L (ref 130–450)
PMV BLD AUTO: 9.6 FL (ref 7–12)
POLYCHROMASIA: ABNORMAL
POTASSIUM REFLEX MAGNESIUM: 3.2 MMOL/L (ref 3.5–5)
PROTHROMBIN TIME: 21.9 SEC (ref 9.3–12.4)
RBC # BLD: 2.85 E12/L (ref 3.5–5.5)
SODIUM BLD-SCNC: 138 MMOL/L (ref 132–146)
TOTAL PROTEIN: 7.7 G/DL (ref 6.4–8.3)
WBC # BLD: 11.3 E9/L (ref 4.5–11.5)

## 2022-02-19 PROCEDURE — 99223 1ST HOSP IP/OBS HIGH 75: CPT | Performed by: INTERNAL MEDICINE

## 2022-02-19 PROCEDURE — 99284 EMERGENCY DEPT VISIT MOD MDM: CPT

## 2022-02-19 PROCEDURE — 86900 BLOOD TYPING SEROLOGIC ABO: CPT

## 2022-02-19 PROCEDURE — 85014 HEMATOCRIT: CPT

## 2022-02-19 PROCEDURE — 86901 BLOOD TYPING SEROLOGIC RH(D): CPT

## 2022-02-19 PROCEDURE — 36415 COLL VENOUS BLD VENIPUNCTURE: CPT

## 2022-02-19 PROCEDURE — 85018 HEMOGLOBIN: CPT

## 2022-02-19 PROCEDURE — 85025 COMPLETE CBC W/AUTO DIFF WBC: CPT

## 2022-02-19 PROCEDURE — 83735 ASSAY OF MAGNESIUM: CPT

## 2022-02-19 PROCEDURE — 2060000000 HC ICU INTERMEDIATE R&B

## 2022-02-19 PROCEDURE — 80053 COMPREHEN METABOLIC PANEL: CPT

## 2022-02-19 PROCEDURE — P9059 PLASMA, FRZ BETWEEN 8-24HOUR: HCPCS

## 2022-02-19 PROCEDURE — 83605 ASSAY OF LACTIC ACID: CPT

## 2022-02-19 PROCEDURE — 86923 COMPATIBILITY TEST ELECTRIC: CPT

## 2022-02-19 PROCEDURE — 86850 RBC ANTIBODY SCREEN: CPT

## 2022-02-19 PROCEDURE — 36430 TRANSFUSION BLD/BLD COMPNT: CPT

## 2022-02-19 PROCEDURE — 2580000003 HC RX 258: Performed by: INTERNAL MEDICINE

## 2022-02-19 PROCEDURE — C9113 INJ PANTOPRAZOLE SODIUM, VIA: HCPCS | Performed by: EMERGENCY MEDICINE

## 2022-02-19 PROCEDURE — 83690 ASSAY OF LIPASE: CPT

## 2022-02-19 PROCEDURE — 85610 PROTHROMBIN TIME: CPT

## 2022-02-19 PROCEDURE — 6360000002 HC RX W HCPCS: Performed by: EMERGENCY MEDICINE

## 2022-02-19 PROCEDURE — 6370000000 HC RX 637 (ALT 250 FOR IP): Performed by: INTERNAL MEDICINE

## 2022-02-19 PROCEDURE — P9016 RBC LEUKOCYTES REDUCED: HCPCS

## 2022-02-19 PROCEDURE — 2580000003 HC RX 258: Performed by: EMERGENCY MEDICINE

## 2022-02-19 RX ORDER — FERROUS SULFATE 325(65) MG
325 TABLET ORAL
Status: DISCONTINUED | OUTPATIENT
Start: 2022-02-20 | End: 2022-02-24 | Stop reason: HOSPADM

## 2022-02-19 RX ORDER — SODIUM CHLORIDE 9 MG/ML
INJECTION, SOLUTION INTRAVENOUS PRN
Status: DISCONTINUED | OUTPATIENT
Start: 2022-02-19 | End: 2022-02-24 | Stop reason: HOSPADM

## 2022-02-19 RX ORDER — SODIUM CHLORIDE 9 MG/ML
25 INJECTION, SOLUTION INTRAVENOUS PRN
Status: DISCONTINUED | OUTPATIENT
Start: 2022-02-19 | End: 2022-02-24 | Stop reason: HOSPADM

## 2022-02-19 RX ORDER — ACETAMINOPHEN 325 MG/1
650 TABLET ORAL EVERY 6 HOURS PRN
Status: DISCONTINUED | OUTPATIENT
Start: 2022-02-19 | End: 2022-02-24 | Stop reason: HOSPADM

## 2022-02-19 RX ORDER — ACETAMINOPHEN 650 MG/1
650 SUPPOSITORY RECTAL EVERY 6 HOURS PRN
Status: DISCONTINUED | OUTPATIENT
Start: 2022-02-19 | End: 2022-02-24 | Stop reason: HOSPADM

## 2022-02-19 RX ORDER — MAGNESIUM OXIDE 400 MG/1
400 TABLET ORAL DAILY
Status: DISCONTINUED | OUTPATIENT
Start: 2022-02-19 | End: 2022-02-24 | Stop reason: HOSPADM

## 2022-02-19 RX ORDER — SODIUM CHLORIDE, SODIUM LACTATE, POTASSIUM CHLORIDE, CALCIUM CHLORIDE 600; 310; 30; 20 MG/100ML; MG/100ML; MG/100ML; MG/100ML
INJECTION, SOLUTION INTRAVENOUS CONTINUOUS
Status: DISCONTINUED | OUTPATIENT
Start: 2022-02-19 | End: 2022-02-19

## 2022-02-19 RX ORDER — ONDANSETRON 2 MG/ML
4 INJECTION INTRAMUSCULAR; INTRAVENOUS EVERY 6 HOURS PRN
Status: DISCONTINUED | OUTPATIENT
Start: 2022-02-19 | End: 2022-02-24 | Stop reason: HOSPADM

## 2022-02-19 RX ORDER — ONDANSETRON 4 MG/1
4 TABLET, ORALLY DISINTEGRATING ORAL EVERY 8 HOURS PRN
Status: DISCONTINUED | OUTPATIENT
Start: 2022-02-19 | End: 2022-02-24 | Stop reason: HOSPADM

## 2022-02-19 RX ORDER — SODIUM CHLORIDE 0.9 % (FLUSH) 0.9 %
5-40 SYRINGE (ML) INJECTION PRN
Status: DISCONTINUED | OUTPATIENT
Start: 2022-02-19 | End: 2022-02-24 | Stop reason: HOSPADM

## 2022-02-19 RX ORDER — POLYETHYLENE GLYCOL 3350 17 G/17G
17 POWDER, FOR SOLUTION ORAL DAILY PRN
Status: DISCONTINUED | OUTPATIENT
Start: 2022-02-19 | End: 2022-02-24 | Stop reason: HOSPADM

## 2022-02-19 RX ORDER — ALLOPURINOL 100 MG/1
100 TABLET ORAL DAILY
Status: DISCONTINUED | OUTPATIENT
Start: 2022-02-20 | End: 2022-02-24 | Stop reason: HOSPADM

## 2022-02-19 RX ORDER — SODIUM CHLORIDE 9 MG/ML
INJECTION, SOLUTION INTRAVENOUS CONTINUOUS
Status: DISCONTINUED | OUTPATIENT
Start: 2022-02-19 | End: 2022-02-22

## 2022-02-19 RX ORDER — SODIUM CHLORIDE 0.9 % (FLUSH) 0.9 %
5-40 SYRINGE (ML) INJECTION EVERY 12 HOURS SCHEDULED
Status: DISCONTINUED | OUTPATIENT
Start: 2022-02-19 | End: 2022-02-24 | Stop reason: HOSPADM

## 2022-02-19 RX ADMIN — PANTOPRAZOLE SODIUM 80 MG: 40 INJECTION, POWDER, FOR SOLUTION INTRAVENOUS at 13:28

## 2022-02-19 RX ADMIN — MAGNESIUM OXIDE 400 MG: 400 TABLET ORAL at 18:47

## 2022-02-19 RX ADMIN — Medication 10 ML: at 21:33

## 2022-02-19 ASSESSMENT — ENCOUNTER SYMPTOMS
COUGH: 0
NAUSEA: 1
SHORTNESS OF BREATH: 0
BLOOD IN STOOL: 1
BACK PAIN: 0

## 2022-02-19 NOTE — PLAN OF CARE
Problem:  Bowel Function - Altered:  Goal: Bowel elimination is within specified parameters  Description: Bowel elimination is within specified parameters  Outcome: Ongoing     Problem: Fluid Volume - Imbalance:  Goal: Will show no signs and symptoms of excessive bleeding  Description: Will show no signs and symptoms of excessive bleeding  Outcome: Ongoing  Goal: Absence of imbalanced fluid volume signs and symptoms  Description: Absence of imbalanced fluid volume signs and symptoms  Outcome: Ongoing     Problem: Nausea/Vomiting:  Goal: Absence of nausea/vomiting  Description: Absence of nausea/vomiting  Outcome: Ongoing  Goal: Able to drink  Description: Able to drink  Outcome: Ongoing  Goal: Able to eat  Description: Able to eat  Outcome: Ongoing  Goal: Ability to achieve adequate nutritional intake will improve  Description: Ability to achieve adequate nutritional intake will improve  Outcome: Ongoing

## 2022-02-19 NOTE — ED PROVIDER NOTES
This is an 81-year-old female with a past medical history of atrial fibrillation who presents to the ED for evaluation of black stools. Patient states that she is on Coumadin for atrial fibrillation and notes that her last dose was on Thursday. Patient states that she has been having black-colored stools for about the past 1 month. Patient states she has been feeling increasingly fatigued has some slight nausea but no pain whatsoever. Patient states she is on a PPI did see her GI specialist yesterday was found to have a hemoglobin of 7.0 was advised to come to the ER for further evaluation. States she had a bowel movement today noticed some dark red blood with the bowel movement. No chest pain or shortness of breath. No other reported mitigating or exacerbating factors. The history is provided by the patient. No  was used. Review of Systems   Constitutional: Positive for fatigue. Negative for fever. HENT: Negative for congestion. Eyes: Negative for visual disturbance. Respiratory: Negative for cough and shortness of breath. Cardiovascular: Negative for chest pain. Gastrointestinal: Positive for blood in stool and nausea. Endocrine: Negative for polyuria. Genitourinary: Negative for dysuria. Musculoskeletal: Negative for back pain. Skin: Negative for rash. Allergic/Immunologic: Negative for immunocompromised state. Neurological: Negative for headaches. Hematological: Bruises/bleeds easily. Psychiatric/Behavioral: Negative for confusion. Physical Exam  Vitals and nursing note reviewed. Constitutional:       General: She is not in acute distress. Appearance: She is well-developed. She is not ill-appearing. HENT:      Head: Normocephalic and atraumatic. Mouth/Throat:      Mouth: Mucous membranes are moist.   Eyes:      Extraocular Movements: Extraocular movements intact. Neck:      Vascular: No JVD.    Cardiovascular:      Rate and Rhythm: Normal rate. Heart sounds: No murmur heard. Pulmonary:      Effort: Pulmonary effort is normal.      Breath sounds: No wheezing, rhonchi or rales. Chest:      Chest wall: No tenderness. Abdominal:      General: There is no distension. Palpations: Abdomen is soft. Tenderness: There is no abdominal tenderness. There is no guarding or rebound. Hernia: No hernia is present. Musculoskeletal:      Cervical back: Normal range of motion and neck supple. Right lower leg: No edema. Left lower leg: No edema. Skin:     General: Skin is warm and dry. Capillary Refill: Capillary refill takes less than 2 seconds. Neurological:      General: No focal deficit present. Mental Status: She is alert and oriented to person, place, and time. Mental status is at baseline. Cranial Nerves: No cranial nerve deficit. Psychiatric:         Mood and Affect: Mood normal.         Behavior: Behavior normal.          Procedures     MDM  Number of Diagnoses or Management Options  Gastrointestinal hemorrhage, unspecified gastrointestinal hemorrhage type  Diagnosis management comments: Patient is an 81-year-old female presented to the ED for evaluation of black-colored stools and abnormal hemoglobin she did appear somewhat pale but was hemodynamically stable here in the department patient already stopped taking her Coumadin INR was 1.9 I did speak with the GI fellow who recommended giving 1 unit FFP patient was transfused 1 unit packed red blood cells as well.  Plan was for scope tomorrow patient was placed on a liquid diet until midnight which point she was n.p.o. 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, Atrial fibrillation ([address] Bilateral Hemoglobin 6.7 (LL) 11.5 - 15.5 g/dL    Hematocrit 23.8 (L) 34.0 - 48.0 %    MCV 83.5 80.0 - 99.9 fL    MCH 23.5 (L) 26.0 - 35.0 pg    MCHC 28.2 (L) 32.0 - 34.5 %    RDW 18.1 (H) 11.5 - 15.0 fL    Platelets 025 946 - 916 E9/L    MPV 9.6 7.0 - 12.0 fL    Neutrophils % 84.2 (H) 43.0 - 80.0 %    Lymphocytes % 8.8 (L) 20.0 - 42.0 %    Monocytes % 7.0 2.0 - 12.0 %    Eosinophils % 0.7 0.0 - 6.0 %    Basophils % 0.8 0.0 - 2.0 %    Neutrophils Absolute 9.49 (H) 1.80 - 7.30 E9/L    Lymphocytes Absolute 1.02 (L) 1.50 - 4.00 E9/L    Monocytes Absolute 0.79 0.10 - 0.95 E9/L    Eosinophils Absolute 0.00 (L) 0.05 - 0.50 E9/L    Basophils Absolute 0.00 0.00 - 0.20 E9/L    Anisocytosis 1+     Polychromasia 1+     Hypochromia 2+    Lipase   Result Value Ref Range    Lipase 57 13 - 60 U/L   Lactic Acid   Result Value Ref Range    Lactic Acid 2.3 (H) 0.5 - 2.2 mmol/L   Protime-INR   Result Value Ref Range    Protime 21.9 (H) 9.3 - 12.4 sec    INR 1.9    Magnesium   Result Value Ref Range    Magnesium 2.9 (H) 1.6 - 2.6 mg/dL   Protime-INR   Result Value Ref Range    Protime 20.4 (H) 9.3 - 12.4 sec    INR 1.7    Hemoglobin and Hematocrit   Result Value Ref Range    Hemoglobin 6.6 (LL) 11.5 - 15.5 g/dL    Hematocrit 22.8 (L) 34.0 - 48.0 %   CBC with Auto Differential   Result Value Ref Range    WBC 9.9 4.5 - 11.5 E9/L    RBC 3.20 (L) 3.50 - 5.50 E12/L    Hemoglobin 8.2 (L) 11.5 - 15.5 g/dL    Hematocrit 27.2 (L) 34.0 - 48.0 %    MCV 85.0 80.0 - 99.9 fL    MCH 25.6 (L) 26.0 - 35.0 pg    MCHC 30.1 (L) 32.0 - 34.5 %    RDW 17.3 (H) 11.5 - 15.0 fL    Platelets 021 871 - 298 E9/L    MPV 9.4 7.0 - 12.0 fL    Neutrophils % 72.6 43.0 - 80.0 %    Immature Granulocytes % 0.4 0.0 - 5.0 %    Lymphocytes % 17.4 (L) 20.0 - 42.0 %    Monocytes % 7.4 2.0 - 12.0 %    Eosinophils % 1.5 0.0 - 6.0 %    Basophils % 0.7 0.0 - 2.0 %    Neutrophils Absolute 7.19 1.80 - 7.30 E9/L    Immature Granulocytes # 0.04 E9/L    Lymphocytes Absolute 1.72 1.50 - 4.00 E9/L    Monocytes Absolute 0.73 0.10 - 0.95 E9/L    Eosinophils Absolute 0.15 0.05 - 0.50 E9/L    Basophils Absolute 0.07 0.00 - 0.20 E9/L   Comprehensive Metabolic Panel   Result Value Ref Range    Sodium 137 132 - 146 mmol/L    Potassium 3.5 3.5 - 5.0 mmol/L    Chloride 97 (L) 98 - 107 mmol/L    CO2 27 22 - 29 mmol/L    Anion Gap 13 7 - 16 mmol/L    Glucose 92 74 - 99 mg/dL    BUN 64 (H) 6 - 23 mg/dL    CREATININE 1.5 (H) 0.5 - 1.0 mg/dL    GFR Non-African American 33 >=60 mL/min/1.73    GFR African American 40     Calcium 9.6 8.6 - 10.2 mg/dL    Total Protein 6.8 6.4 - 8.3 g/dL    Albumin 3.7 3.5 - 5.2 g/dL    Total Bilirubin 1.1 0.0 - 1.2 mg/dL    Alkaline Phosphatase 40 35 - 104 U/L    ALT 8 0 - 32 U/L    AST 20 0 - 31 U/L   Magnesium   Result Value Ref Range    Magnesium 2.4 1.6 - 2.6 mg/dL   TYPE AND SCREEN   Result Value Ref Range    ABO/Rh A POS     Antibody Screen NEG    PREPARE RBC (CROSSMATCH)   Result Value Ref Range    Product Code Blood Bank [id number]     Description Blood Bank Red Blood Cells, Leuko-reduced     Unit Number [id number]     Dispense Status Blood Bank transfused     Product Code Blood Bank [id number]     Description Blood Bank Red Blood Cells, Leuko-reduced     Unit Number [id number]     Dispense Status Blood Bank transfused     Product Code Blood Bank [id number]     Description Blood Bank Red Blood Cells, Leuko-reduced     Unit Number [id number]     Dispense Status Blood Bank issued    PREPARE FRESH FROZEN PLASMA   Result Value Ref Range    Product Code Blood Bank [id number]     Description Blood Bank Plasma, 5 Day, Thawed     Unit Number [id number]     Dispense Status Blood Bank transfused        RADIOLOGY:  No orders to display           ------------------------- NURSING NOTES AND VITALS REVIEWED ---------------------------  Date / Time Roomed:  2/19/2022 11:06 AM  ED Bed Assignment:  [id number]    The nursing notes within the ED encounter and vital signs as below have been reviewed. Patient Vitals for the past 24 hrs:   BP Temp Temp src Pulse Resp SpO2 Height Weight   02/20/22 0353 (!) 100/40 98.6 °F (37 °C) Oral 80 10 -- -- --   02/20/22 0156 (!) 106/54 98.6 °F (37 °C) Oral 97 18 -- -- --   02/20/22 0124 (!) 101/52 98.6 °F (37 °C) Oral 95 19 -- -- --   02/19/22 2330 (!) 120/30 98.4 °F (36.9 °C) Oral 85 19 -- -- --   02/19/22 2315 (!) 124/50 98.6 °F (37 °C) Oral 90 18 -- -- --   02/19/22 2300 -- -- -- 90 -- -- -- --   02/19/22 2129 (!) 143/53 98.4 °F (36.9 °C) Oral 65 18 -- -- --   02/19/22 2051 133/70 98.4 °F (36.9 °C) Oral 81 17 -- -- --   02/19/22 2030 (!) 125/58 98.4 °F (36.9 °C) Oral 73 16 96 % -- --   02/19/22 1957 (!) 110/38 98.6 °F (37 °C) Oral 75 16 96 % -- --   02/19/22 1805 (!) 146/58 98.1 °F (36.7 °C) Oral 64 20 100 % -- --   02/19/22 1619 (!) 146/58 98.1 °F (36.7 °C) Oral 64 20 100 % -- --   02/19/22 1546 (!) 110/49 97.9 °F (36.6 °C) Oral 77 20 99 % -- --   02/19/22 1417 -- -- -- 54 -- -- -- --   02/19/22 1409 (!) 119/46 98.3 °F (36.8 °C) Oral 54 20 100 % -- --   02/19/22 1105 (!) 121/56 -- -- 89 20 100 % 4' 6\" (1.372 m) 200 lb (90.7 kg)   02/19/22 1100 -- 97.4 °F (36.3 °C) -- -- -- -- -- --       Oxygen Saturation Interpretation: Normal    ------------------------------------------ PROGRESS NOTES ------------------------------------------  Re-evaluation(s):  Time: 7053 Patients symptoms show no change  Repeat physical examination is not changed    Counseling:  I have spoken with the patient and discussed todays results, in addition to providing specific details for the plan of care and counseling regarding the diagnosis and prognosis. Their questions are answered at this time and they are agreeable with the plan of admission.    --------------------------------- ADDITIONAL PROVIDER NOTES ---------------------------------  Consultations:  Spoke with Dr. Demarco Argueta  Discussed case. They will admit the patient.   This patient's ED course included: a personal history and physicial examination, re-evaluation prior to disposition, multiple bedside re-evaluations, IV medications, cardiac monitoring, continuous pulse oximetry and complex medical decision making and emergency management    This patient has remained hemodynamically stable during their ED course. Please note that the withdrawal or failure to initiate urgent interventions for this patient would likely result in a life threatening deterioration or permanent disability. Systems at risk for deterioration include: GI/CV    Accordingly this patient received 32 minutes of critical care time, excluding separately billable procedures. Diagnosis:  1. Gastrointestinal hemorrhage, unspecified gastrointestinal hemorrhage type        Disposition:  Patient's disposition: Admit to telemetry  Patient's condition is stable.         Mariposa Spears,   02/20/22 1439

## 2022-02-19 NOTE — ED NOTES
Report called to the floor. No further questions and did inform them that the patient still needs a unit of blood.       Yuval Fang RN  02/19/22 5040

## 2022-02-19 NOTE — H&P
[address] [place], [place] [address]ist Group   History and Physical      CHIEF COMPLAINT: Black stool and blood in stool    History of Present Illness:  81 y.o. female with a history of paroxysmal atrial fibrillation on chronic warfarin, peptic ulcer disease, stage IIIb chronic kidney disease, hypertension and obesity presents with melena and hematochezia. Patient states that over the last month or possibly longer, she has noticed frequent dark stool. She had outpatient labs done about 1 week ago which showed hemoglobin of 9. Over the last few days she has also noticed blood in the toilet after bowel movements. She subsequently had labs done again yesterday and hemoglobin was found to be 7.0, and she was advised to go to the ED. At this time she is awake, alert, feels very fatigued. She has occasional shortness of breath, but uses oxygen intermittently at home. She has also had intermittent dizziness. In the ED, vital signs showed blood pressure 121/56 but heart rate, respiratory rate, temperature, and oxygen saturation with limits on room air. CBC remarkable for hemoglobin of 6.7. Chemistries remarkable for mild hypokalemia with potassium of 3.2, creatinine consistent with her chronic kidney disease, and mild lactic acidosis with lactic acid 2.3. Hepatic function panel is unremarkable. INR 1.9. Informant(s) for H&P: Patient, ED physician, chart review    REVIEW OF SYSTEMS:  no fevers, chills, cp, sob, n/v, ha, vision/hearing changes, wt changes, hot/cold flashes, other open skin lesions, diarrhea, constipation, dysuria/hematuria unless noted in HPI. Complete ROS performed with the patient and is otherwise negative. 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  Moderate dilation of RA Moderate TR RV systolic pressure = 60 mm Hg     Bilateral sensorineural hearing loss 01/29/2015    Degenerative joint disease of sacroiliac region ([facility] [address] 03/29/2013    HTN (hypertension) 01/19/2012    Morbid obesity with BMI of 50.0-59.9, adult ([facility] [address]     Peptic ulcer disease     Stage 3b [facility]        Surgical History:  Past Surgical History:   Procedure Laterality Date    CARDIAC CATHETERIZATION  ~2005    CATARACT REMOVAL WITH IMPLANT Bilateral ~2007    CHOLECYSTECTOMY, OPEN  1995    LEG BIOPSY EXCISION Right 10/24/2019    EXCISIONAL DEBRIDEMENT RIGHT LEG performed by Edith Wilder MD at [facility] [address] Bilateral 2008   [facility]       Medications Prior to Admission:    Prior to Admission medications    Medication Sig Start Date End Date Taking?  Authorizing Provider   omeprazole (PRILOSEC) 20 MG delayed release capsule Take 40 mg by mouth daily    Historical Provider, MD   furosemide (LASIX) 40 MG tablet Take 40 mg by mouth 2 times daily    Historical Provider, MD   metoprolol tartrate (LOPRESSOR) 25 MG tablet Take 1 tablet by mouth 2 times daily 10/26/19   Oumar Elizabeth DO   magnesium oxide (MAG-OX) 400 MG tablet Take 400 mg by mouth daily    Historical Provider, MD   ferrous sulfate 325 (65 Fe) MG tablet Take 325 mg by mouth daily (with breakfast)    Historical Provider, MD   allopurinol (ZYLOPRIM) 100 MG tablet Take 100 mg by mouth daily    Historical Provider, MD   warfarin (COUMADIN) 2.5 MG tablet Take 2.5 mg by mouth Indications: 2.5 Mg Monday thru Friday    Historical Provider, MD   warfarin (COUMADIN) 5 MG tablet Take 5 mg by mouth Indications: Saturday & Sunday's    Historical Provider, MD   omeprazole (PRILOSEC) 20 MG capsule TAKE 2 CAPSULES BY MOUTH ONE TIME DAILY -PLEASE REORDER 1 BUSINESS DAY IN ADVANCE USING PHONE NUMBER ABOVE. 9/16/15 9/17/19  Sofía Fees       Allergies:    Amoxicillin, Iodine, Shellfish-derived products, and Sodium bicarbonate    Social History:    reports that she has quit smoking. She has never used smokeless tobacco. She reports that she does not drink alcohol and does not use drugs. Family History:   family history includes Cancer in her maternal grandmother; Diabetes in her father; Heart Disease in her father and mother; Hypertension in her sister. PHYSICAL EXAM:  Vitals:  BP (!) 121/56   Pulse 89   Temp 97.4 °F (36.3 °C)   Resp 20   Ht 4' 6\" (1.372 m)   Wt 200 lb (90.7 kg)   SpO2 100%   BMI 48.22 kg/m²     General Appearance: alert and oriented to person, place and time and in no acute distress  Skin: warm and dry, mild pallor  Head: normocephalic and atraumatic  Eyes: Mild conjunctival pallor present. Pupils equal, round, and reactive to light, extraocular eye movements intact, anicteric sclera  Neck: neck supple and non tender without mass   Pulmonary/Chest: Nonlabored on room air. Clear to auscultation bilaterally  Cardiovascular: Normal rate, irregular rhythm, S1, S2 with grade 1/6 systolic flow murmur  Abdomen: soft, mild epigastric tenderness without peritoneal signs, non-distended, normal bowel sounds, no masses or organomegaly  Extremities: no cyanosis, no clubbing and no edema  Neurologic: no cranial nerve deficit and speech normal    LABS:  Recent Labs     02/19/22  1116      K 3.2*   CL 94*   CO2 30*   BUN 74*   CREATININE 1.6*   GLUCOSE 106*   CALCIUM 9.9       Recent Labs     02/19/22  1116   WBC 11.3   RBC 2.85*   HGB 6.7*   HCT 23.8*   MCV 83.5   MCH 23.5*   MCHC 28.2*   RDW 18.1*      MPV 9.6       No results for input(s): POCGLU in the last 72 hours. Radiology: No results found. ASSESSMENT AND PLAN:      Principal Problem:    Upper GI hemorrhage  Active Problems:    Acute blood loss anemia    HTN (hypertension)    Atrial fibrillation (HCC)    GERD (gastroesophageal reflux disease)    Hypokalemia  Resolved Problems:    * No resolved hospital problems. *      1.   Upper gastrointestinal hemorrhage  -IV PPI bolus ordered per ED  -Start continuous PPI drip  -IV fluids with lactated Ringer's at 100 mL/h for 5 hours then 75mL/hr after  -Consult GI for urgeny endoscopy     2. Acute blood loss anemia  -Due to above  -1 unit PRBC ordered in the ED  -PPI as noted  -H&H every 6 hours for now. Transfuse for hemoglobin less than 7, signs of brisk bleeding, or hemodynamic instability    3. Paroxysmal atrial fibrillation  -Hold anticoagulation given acute GI hemorrhage  -She has been on warfarin for several years, and this is at least the second GI hemorrhage she has had. She has inquired about changing anticoagulation to apixaban which is a reasonable consideration. We will discuss further after her GI bleeding has been addressed  -Heart rate is well controlled. We will hold beta-blocker for now given soft blood pressure in setting of GI bleed. Will use as needed IV medications if heart rate becomes uncontrolled    4. Hypokalemia  -Mild  -Hold off on replacement for now as she will be receiving PRBC transfusion which may increase potassium level    5. GERD  -IV PPI for now, then oral after endoscopy    6. Obesity with BMI 48.22  -Counseled on diet and lifestyle modifications    Code Status: DNR CCA  DVT prophylaxis: SCDs    NOTE: This report was transcribed using voice recognition software.  Every effort was made to ensure accuracy; however, inadvertent computerized transcription errors may be present.     Electronically signed by Anders Caballero DO on 2/19/2022 at 1:42 PM

## 2022-02-19 NOTE — CONSULTS
[facility]  Dr. Jim Rajput M.D., Dr. Cheri Smith M.D., NGOZI Macias.O., Dr. Roby Gilliam M.D., NGOZI Ramirez.O. Patient Name: aMra Guerrier  MRN: [id number]  : 1935 (81 y.o. female)  Allergies: is allergic to amoxicillin, iodine, shellfish-derived products, and sodium bicarbonate. Date of Service: 2022       Reason for Consultation:  Anemia    HISTORY OF PRESENT ILLNESS:      The patient is a 81 y.o. female who presents with anemia. She has a history of afib on coumadin. She was seen in the office with Dr. Saroj Joya 2022 for melena and POC hemoglobin was 7.0 - she was advised to come to the ER, but she did not. Overnight, she had an episode of dark stool mixed with bright red blood, with increasing fatigue, which prompted her visit to the ER. Currently she denies abdominal pain, nasuea, vomiting, hematemesis. Last dose of coumadin was 2 days ago. 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  Moderate dilation of RA Moderate TR RV systolic pressure = 60 mm Hg     Bilateral sensorineural hearing loss 2015    Degenerative joint disease of sacroiliac region ([address] 2013    HTN (hypertension) 2012    Morbid obesity with BMI of 50.0-59.9, adult ([address]     Peptic ulcer disease     Stage 3b chronic kidney disease ([address]        Past Surgical History:  Past Surgical History:   Procedure Laterality Date    CARDIAC CATHETERIZATION  ~    CATARACT REMOVAL WITH IMPLANT Bilateral ~    CHOLECYSTECTOMY, OPEN      LEG BIOPSY EXCISION Right 10/24/2019    EXCISIONAL DEBRIDEMENT RIGHT LEG performed by Oswaldo Avila MD at [facility] Bilateral     TUBAL LIGATION  1966       Home Medications:  Prior to Admission medications    Medication Sig Start Date End Date Taking?  Authorizing Provider   omeprazole (PRILOSEC) 20 MG delayed release capsule Take 40 mg by mouth daily    Historical Provider, MD   furosemide (LASIX) 40 MG tablet Take 40 mg by mouth 2 times daily    Historical Provider, MD   metoprolol tartrate (LOPRESSOR) 25 MG tablet Take 1 tablet by mouth 2 times daily 10/26/19   Albertina Martínez DO   magnesium oxide (MAG-OX) 400 MG tablet Take 400 mg by mouth daily    Historical Provider, MD   ferrous sulfate 325 (65 Fe) MG tablet Take 325 mg by mouth daily (with breakfast)    Historical Provider, MD   allopurinol (ZYLOPRIM) 100 MG tablet Take 100 mg by mouth daily    Historical Provider, MD   warfarin (COUMADIN) 2.5 MG tablet Take 2.5 mg by mouth Indications: 2.5 Mg Monday thru Friday    Historical Provider, MD   warfarin (COUMADIN) 5 MG tablet Take 5 mg by mouth Indications: Saturday & Sunday's    Historical Provider, MD   omeprazole (PRILOSEC) 20 MG capsule TAKE 2 CAPSULES BY MOUTH ONE TIME DAILY -PLEASE REORDER 1 BUSINESS DAY IN ADVANCE USING PHONE NUMBER ABOVE. 9/16/15 9/17/19  Qing Jaimes       Allergies: Amoxicillin, Iodine, Shellfish-derived products, and Sodium bicarbonate    Social History:  Social History     Socioeconomic History    Marital status:      Spouse name:     Number of children: 5    Years of education: Not on file    Highest education level: Not on file   Occupational History    Occupation: CLERICAL    Tobacco Use    Smoking status: Former Smoker    Smokeless tobacco: Never Used    Tobacco comment: social smoker quit 1980       Vaping Use    Vaping Use: Never used   Substance and Sexual Activity    Alcohol use: No     Alcohol/week: 0.0 standard drinks    Drug use: No    Sexual activity: Not Currently   Other Topics Concern    Not on file   Social History Narrative    Not on file     Social Determinants of Health     Financial Resource Strain:     Difficulty of Paying Living Expenses: Not on file   Food Insecurity:     Worried About [address] [employer] in the Last Year: Not on file    Ran Out of Food in the Last Year: Not on file   Transportation Needs:     Lack of Transportation (Medical): Not on file    Lack of Transportation (Non-Medical): Not on file   Physical Activity:     Days of Exercise per Week: Not on file    Minutes of Exercise per Session: Not on file   Stress:     Feeling of Stress : Not on file   Social Connections:     Frequency of Communication with Friends and Family: Not on file    Frequency of Social Gatherings with Friends and Family: Not on file    Attends [employer] Services: Not on file    Active Member of [address] [place] [address] or Organizations: Not on file    Attends Club or Organization Meetings: Not on file    Marital Status: Not on file   Intimate Partner Violence:     Fear of Current or Ex-Partner: Not on file    Emotionally Abused: Not on file    Physically Abused: Not on file    Sexually Abused: Not on file   Housing Stability:     Unable to Pay for Housing in the Last Year: Not on file    Number of [place] in the Last Year: Not on file    Unstable Housing in the Last Year: Not on file       Family History:  Family History   Problem Relation Age of Onset    Cancer Maternal Grandmother         uterine    Heart Disease Mother         CAD    Diabetes Father         SON    Heart Disease Father     Hypertension Sister          PHYSICAL EXAM:  Vital Signs: BP (!) 143/58   Pulse 54   Temp 98.3 °F (36.8 °C) (Oral)   Resp 20   Ht 4' 6\" (1.372 m)   Wt 200 lb (90.7 kg)   SpO2 100%   BMI 48.22 kg/m²   GENERAL APPEARANCE:  awake, alert, oriented, cooperative, and in no acute distress  EYES:  Lids and lashes normal, sclera clear  HENT:  Normocephalic, without obvious abnormality  LUNGS:  Clear to auscultation bilaterally. No increased work of breathing, good air exchange.   CARDIOVASCULAR: Regular rate and rhythm  ABDOMEN: normal bowel sounds in all 4 quadrants, soft, non-distended, non-tender, no masses palpated, no hepatosplenomegally  MUSCULOSKELETAL:  There is no redness, warmth, or swelling of the joints. EXTREMITIES: No edema  NEUROLOGIC:  Awake, alert, oriented to name, place and time. SKIN: Normal skin color, no rashes  PSYCH: Affect, behavior and insight are all within normal limits.       DATA:  Results for orders placed or performed during the hospital encounter of 02/19/22   Comprehensive Metabolic Panel w/ Reflex to MG   Result Value Ref Range    Sodium 138 132 - 146 mmol/L    Potassium reflex Magnesium 3.2 (L) 3.5 - 5.0 mmol/L    Chloride 94 (L) 98 - 107 mmol/L    CO2 30 (H) 22 - 29 mmol/L    Anion Gap 14 7 - 16 mmol/L    Glucose 106 (H) 74 - 99 mg/dL    BUN 74 (H) 6 - 23 mg/dL    CREATININE 1.6 (H) 0.5 - 1.0 mg/dL    GFR Non-African American 31 >=60 mL/min/1.73    GFR African American 37     Calcium 9.9 8.6 - 10.2 mg/dL    Total Protein 7.7 6.4 - 8.3 g/dL    Albumin 4.0 3.5 - 5.2 g/dL    Total Bilirubin 0.6 0.0 - 1.2 mg/dL    Alkaline Phosphatase 42 35 - 104 U/L    ALT 6 0 - 32 U/L    AST 17 0 - 31 U/L   CBC with Auto Differential   Result Value Ref Range    WBC 11.3 4.5 - 11.5 E9/L    RBC 2.85 (L) 3.50 - 5.50 E12/L    Hemoglobin 6.7 (LL) 11.5 - 15.5 g/dL    Hematocrit 23.8 (L) 34.0 - 48.0 %    MCV 83.5 80.0 - 99.9 fL    MCH 23.5 (L) 26.0 - 35.0 pg    MCHC 28.2 (L) 32.0 - 34.5 %    RDW 18.1 (H) 11.5 - 15.0 fL    Platelets 852 676 - 219 E9/L    MPV 9.6 7.0 - 12.0 fL    Neutrophils % 84.2 (H) 43.0 - 80.0 %    Lymphocytes % 8.8 (L) 20.0 - 42.0 %    Monocytes % 7.0 2.0 - 12.0 %    Eosinophils % 0.7 0.0 - 6.0 %    Basophils % 0.8 0.0 - 2.0 %    Neutrophils Absolute 9.49 (H) 1.80 - 7.30 E9/L    Lymphocytes Absolute 1.02 (L) 1.50 - 4.00 E9/L    Monocytes Absolute 0.79 0.10 - 0.95 E9/L    Eosinophils Absolute 0.00 (L) 0.05 - 0.50 E9/L    Basophils Absolute 0.00 0.00 - 0.20 E9/L    Anisocytosis 1+     Polychromasia 1+ Hypochromia 2+    Lipase   Result Value Ref Range    Lipase 57 13 - 60 U/L   Lactic Acid   Result Value Ref Range    Lactic Acid 2.3 (H) 0.5 - 2.2 mmol/L   Protime-INR   Result Value Ref Range    Protime 21.9 (H) 9.3 - 12.4 sec    INR 1.9    Magnesium   Result Value Ref Range    Magnesium 2.9 (H) 1.6 - 2.6 mg/dL   TYPE AND SCREEN   Result Value Ref Range    ABO/Rh A POS     Antibody Screen NEG    PREPARE RBC (CROSSMATCH)   Result Value Ref Range    Product Code Blood Bank C3906P90     Description Blood Bank Red Blood Cells, Leuko-reduced     Unit Number [id number]     Dispense Status Blood Bank selected    PREPARE FRESH FROZEN PLASMA   Result Value Ref Range    Product Code Blood Bank [id number]     Description Blood Bank Plasma, 5 Day, Thawed     Unit Number [id number]     Dispense Status Blood Bank selected          IMAGING:  No results found. ASSESSMENT and PLAN:    # Acute blood loss anemia  - Hgb 6.7 today, POC as outpatient was 7.0; most recent labs available show hgb 11.5 in Jan 2021  - With melena and hematochezia  - Start IV PPI, agree with infusion  - Trend H&H q6hr and transfuse per primary  - Okay for CLD today and then NPO after midnight for EGD tomorrow; can plan for more emergent EGD if she shows evidence of hemodynamic instability  - Ideally INR 1.5 or less prior to EGD; currently 1.8  - Transfuse 1u FFP now  - Hold anticoagulation    # Afib on coumadin  - Anticoagulation as above    Please see orders for further plan of care. Will discuss above with Dr. Nereyda Tellez DO  GI fellow  2/19/2022 at 2:12 PM      Patient seen and examined independently. Discussed with fellow and agree with the plan of care stated above.     Jany Sigala DO  2/20/2022  9:59 AM

## 2022-02-20 ENCOUNTER — ANESTHESIA EVENT (OUTPATIENT)
Dept: ENDOSCOPY | Age: 87
DRG: 378 | End: 2022-02-20
Payer: MEDICARE

## 2022-02-20 ENCOUNTER — ANESTHESIA (OUTPATIENT)
Dept: ENDOSCOPY | Age: 87
DRG: 378 | End: 2022-02-20
Payer: MEDICARE

## 2022-02-20 VITALS
OXYGEN SATURATION: 100 % | RESPIRATION RATE: 19 BRPM | DIASTOLIC BLOOD PRESSURE: 48 MMHG | SYSTOLIC BLOOD PRESSURE: 120 MMHG

## 2022-02-20 PROBLEM — K31.819 AVM (ARTERIOVENOUS MALFORMATION) OF DUODENUM, ACQUIRED: Status: ACTIVE | Noted: 2022-02-20

## 2022-02-20 LAB
ALBUMIN SERPL-MCNC: 3.7 G/DL (ref 3.5–5.2)
ALP BLD-CCNC: 40 U/L (ref 35–104)
ALT SERPL-CCNC: 8 U/L (ref 0–32)
ANION GAP SERPL CALCULATED.3IONS-SCNC: 13 MMOL/L (ref 7–16)
AST SERPL-CCNC: 20 U/L (ref 0–31)
BASOPHILS ABSOLUTE: 0.07 E9/L (ref 0–0.2)
BASOPHILS RELATIVE PERCENT: 0.7 % (ref 0–2)
BILIRUB SERPL-MCNC: 1.1 MG/DL (ref 0–1.2)
BUN BLDV-MCNC: 64 MG/DL (ref 6–23)
CALCIUM SERPL-MCNC: 9.6 MG/DL (ref 8.6–10.2)
CHLORIDE BLD-SCNC: 97 MMOL/L (ref 98–107)
CO2: 27 MMOL/L (ref 22–29)
CREAT SERPL-MCNC: 1.5 MG/DL (ref 0.5–1)
EOSINOPHILS ABSOLUTE: 0.15 E9/L (ref 0.05–0.5)
EOSINOPHILS RELATIVE PERCENT: 1.5 % (ref 0–6)
GFR AFRICAN AMERICAN: 40
GFR NON-AFRICAN AMERICAN: 33 ML/MIN/1.73
GLUCOSE BLD-MCNC: 92 MG/DL (ref 74–99)
HCT VFR BLD CALC: 24 % (ref 34–48)
HCT VFR BLD CALC: 25.4 % (ref 34–48)
HCT VFR BLD CALC: 26.4 % (ref 34–48)
HCT VFR BLD CALC: 27.2 % (ref 34–48)
HEMOGLOBIN: 7.3 G/DL (ref 11.5–15.5)
HEMOGLOBIN: 7.8 G/DL (ref 11.5–15.5)
HEMOGLOBIN: 7.9 G/DL (ref 11.5–15.5)
HEMOGLOBIN: 8.2 G/DL (ref 11.5–15.5)
IMMATURE GRANULOCYTES #: 0.04 E9/L
IMMATURE GRANULOCYTES %: 0.4 % (ref 0–5)
INR BLD: 1.7
LYMPHOCYTES ABSOLUTE: 1.72 E9/L (ref 1.5–4)
LYMPHOCYTES RELATIVE PERCENT: 17.4 % (ref 20–42)
MAGNESIUM: 2.4 MG/DL (ref 1.6–2.6)
MCH RBC QN AUTO: 25.6 PG (ref 26–35)
MCHC RBC AUTO-ENTMCNC: 30.1 % (ref 32–34.5)
MCV RBC AUTO: 85 FL (ref 80–99.9)
MONOCYTES ABSOLUTE: 0.73 E9/L (ref 0.1–0.95)
MONOCYTES RELATIVE PERCENT: 7.4 % (ref 2–12)
NEUTROPHILS ABSOLUTE: 7.19 E9/L (ref 1.8–7.3)
NEUTROPHILS RELATIVE PERCENT: 72.6 % (ref 43–80)
PDW BLD-RTO: 17.3 FL (ref 11.5–15)
PLATELET # BLD: 314 E9/L (ref 130–450)
PMV BLD AUTO: 9.4 FL (ref 7–12)
POTASSIUM SERPL-SCNC: 3.5 MMOL/L (ref 3.5–5)
PROTHROMBIN TIME: 20.4 SEC (ref 9.3–12.4)
RBC # BLD: 3.2 E12/L (ref 3.5–5.5)
SODIUM BLD-SCNC: 137 MMOL/L (ref 132–146)
TOTAL PROTEIN: 6.8 G/DL (ref 6.4–8.3)
WBC # BLD: 9.9 E9/L (ref 4.5–11.5)

## 2022-02-20 PROCEDURE — 2580000003 HC RX 258: Performed by: INTERNAL MEDICINE

## 2022-02-20 PROCEDURE — 36415 COLL VENOUS BLD VENIPUNCTURE: CPT

## 2022-02-20 PROCEDURE — 2580000003 HC RX 258: Performed by: NURSE ANESTHETIST, CERTIFIED REGISTERED

## 2022-02-20 PROCEDURE — 99233 SBSQ HOSP IP/OBS HIGH 50: CPT | Performed by: INTERNAL MEDICINE

## 2022-02-20 PROCEDURE — 7100000011 HC PHASE II RECOVERY - ADDTL 15 MIN: Performed by: INTERNAL MEDICINE

## 2022-02-20 PROCEDURE — C9113 INJ PANTOPRAZOLE SODIUM, VIA: HCPCS | Performed by: INTERNAL MEDICINE

## 2022-02-20 PROCEDURE — 85025 COMPLETE CBC W/AUTO DIFF WBC: CPT

## 2022-02-20 PROCEDURE — 0W3P8ZZ CONTROL BLEEDING IN GASTROINTESTINAL TRACT, VIA NATURAL OR ARTIFICIAL OPENING ENDOSCOPIC: ICD-10-PCS | Performed by: INTERNAL MEDICINE

## 2022-02-20 PROCEDURE — 80053 COMPREHEN METABOLIC PANEL: CPT

## 2022-02-20 PROCEDURE — 6360000002 HC RX W HCPCS: Performed by: INTERNAL MEDICINE

## 2022-02-20 PROCEDURE — 85610 PROTHROMBIN TIME: CPT

## 2022-02-20 PROCEDURE — 2709999900 HC NON-CHARGEABLE SUPPLY: Performed by: INTERNAL MEDICINE

## 2022-02-20 PROCEDURE — 83735 ASSAY OF MAGNESIUM: CPT

## 2022-02-20 PROCEDURE — 85018 HEMOGLOBIN: CPT

## 2022-02-20 PROCEDURE — 3609013000 HC EGD TRANSORAL CONTROL BLEEDING ANY METHOD: Performed by: INTERNAL MEDICINE

## 2022-02-20 PROCEDURE — 3700000000 HC ANESTHESIA ATTENDED CARE: Performed by: INTERNAL MEDICINE

## 2022-02-20 PROCEDURE — 6360000002 HC RX W HCPCS: Performed by: NURSE ANESTHETIST, CERTIFIED REGISTERED

## 2022-02-20 PROCEDURE — 2060000000 HC ICU INTERMEDIATE R&B

## 2022-02-20 PROCEDURE — 3700000001 HC ADD 15 MINUTES (ANESTHESIA): Performed by: INTERNAL MEDICINE

## 2022-02-20 PROCEDURE — 85014 HEMATOCRIT: CPT

## 2022-02-20 PROCEDURE — 6370000000 HC RX 637 (ALT 250 FOR IP): Performed by: INTERNAL MEDICINE

## 2022-02-20 PROCEDURE — 7100000010 HC PHASE II RECOVERY - FIRST 15 MIN: Performed by: INTERNAL MEDICINE

## 2022-02-20 RX ORDER — POLYETHYLENE GLYCOL 3350, SODIUM CHLORIDE, SODIUM BICARBONATE, POTASSIUM CHLORIDE 420; 11.2; 5.72; 1.48 G/4L; G/4L; G/4L; G/4L
4000 POWDER, FOR SOLUTION ORAL ONCE
Status: COMPLETED | OUTPATIENT
Start: 2022-02-20 | End: 2022-02-20

## 2022-02-20 RX ORDER — PROPOFOL 10 MG/ML
INJECTION, EMULSION INTRAVENOUS CONTINUOUS PRN
Status: DISCONTINUED | OUTPATIENT
Start: 2022-02-20 | End: 2022-02-20 | Stop reason: SDUPTHER

## 2022-02-20 RX ORDER — SODIUM CHLORIDE 9 MG/ML
INJECTION, SOLUTION INTRAVENOUS CONTINUOUS PRN
Status: DISCONTINUED | OUTPATIENT
Start: 2022-02-20 | End: 2022-02-20 | Stop reason: SDUPTHER

## 2022-02-20 RX ORDER — PANTOPRAZOLE SODIUM 40 MG/1
40 TABLET, DELAYED RELEASE ORAL
Status: DISCONTINUED | OUTPATIENT
Start: 2022-02-20 | End: 2022-02-24 | Stop reason: HOSPADM

## 2022-02-20 RX ADMIN — PROPOFOL 75 MCG/KG/MIN: 10 INJECTION, EMULSION INTRAVENOUS at 09:34

## 2022-02-20 RX ADMIN — FERROUS SULFATE TAB 325 MG (65 MG ELEMENTAL FE) 325 MG: 325 (65 FE) TAB at 10:44

## 2022-02-20 RX ADMIN — POLYETHYLENE GLYCOL 3350, SODIUM CHLORIDE, SODIUM BICARBONATE, POTASSIUM CHLORIDE 4000 ML: 420; 11.2; 5.72; 1.48 POWDER, FOR SOLUTION ORAL at 15:11

## 2022-02-20 RX ADMIN — SODIUM CHLORIDE: 9 INJECTION, SOLUTION INTRAVENOUS at 09:30

## 2022-02-20 RX ADMIN — PANTOPRAZOLE SODIUM 40 MG: 40 TABLET, DELAYED RELEASE ORAL at 11:00

## 2022-02-20 RX ADMIN — ALLOPURINOL 100 MG: 100 TABLET ORAL at 10:44

## 2022-02-20 RX ADMIN — SODIUM CHLORIDE: 9 INJECTION, SOLUTION INTRAVENOUS at 07:17

## 2022-02-20 RX ADMIN — MAGNESIUM OXIDE 400 MG: 400 TABLET ORAL at 10:44

## 2022-02-20 RX ADMIN — PANTOPRAZOLE SODIUM 8 MG/HR: 40 INJECTION, POWDER, FOR SOLUTION INTRAVENOUS at 07:18

## 2022-02-20 RX ADMIN — Medication 5 ML: at 20:27

## 2022-02-20 ASSESSMENT — LIFESTYLE VARIABLES: SMOKING_STATUS: 0

## 2022-02-20 NOTE — ANESTHESIA POSTPROCEDURE EVALUATION
Department of Anesthesiology  Postprocedure Note    Patient: Barbara Powell  MRN: [id number]  [place]: 1935  Date of evaluation: 2/20/2022  Time:  6:00 PM     Procedure Summary     Date: 02/20/22 Room / Location: [address] [place] [address] 01 / [facility]    Anesthesia Start: 0930 Anesthesia Stop: 6925    Procedure: EGD CONTROL HEMORRHAGE (N/A ) Diagnosis: (anemia, melena)    Surgeons: Darlene Osorio DO Responsible Provider: Jaylan Deal MD    Anesthesia Type: MAC ASA Status: 3          Anesthesia Type: MAC    Nathaly Phase I:      Nathaly Phase II: Nathaly Score: 10    Last vitals: Reviewed and per EMR flowsheets.        Anesthesia Post Evaluation    Patient location during evaluation: PACU  Patient participation: complete - patient participated  Level of consciousness: awake  Airway patency: patent  Nausea & Vomiting: no nausea and no vomiting  Complications: no  Cardiovascular status: hemodynamically stable  Respiratory status: acceptable  Hydration status: stable

## 2022-02-20 NOTE — PROGRESS NOTES
Report received from nightshift RN. Patient awake and alert, sitting on bedside commode, oriented x4, with no complaints of pain or discomfort. Patient aware of NPO status for AM EGD. Vital signs reviewed. Labs and orders reviewed. Will assume care of patient.

## 2022-02-20 NOTE — PROGRESS NOTES
[address] [place], [place] [address]   Progress Note    Admitting Date and Time: 2/19/2022 11:06 AM  Admit Dx: Upper GI hemorrhage [K92.2]    Subjective/interval history:    Pt admitted yesterday afternoon with acute blood loss anemia due to upper GI bleed. She had EGD this morning which showed mild antral gastritis as well as duodenal AVM without active bleeding. GI recommended inpatient colonoscopy. At this time she is awake, alert, feels fatigued but denies any other complaints. ROS: denies fever, chills, cp, sob, n/v, HA unless stated above.  pantoprazole  40 mg Oral QAM AC    polyethylene glycol-electrolytes  4,000 mL Oral Once    allopurinol  100 mg Oral Daily    ferrous sulfate  325 mg Oral Daily with breakfast    magnesium oxide  400 mg Oral Daily    [Held by provider] metoprolol tartrate  25 mg Oral BID    sodium chloride flush  5-40 mL IntraVENous 2 times per day     sodium chloride, , PRN  sodium chloride flush, 5-40 mL, PRN  sodium chloride, 25 mL, PRN  ondansetron, 4 mg, Q8H PRN   Or  ondansetron, 4 mg, Q6H PRN  polyethylene glycol, 17 g, Daily PRN  acetaminophen, 650 mg, Q6H PRN   Or  acetaminophen, 650 mg, Q6H PRN  sodium chloride, , PRN  sodium chloride, , PRN  sodium chloride, , PRN         Objective:    BP (!) 138/53   Pulse 75   Temp 98 °F (36.7 °C) (Infrared)   Resp 22   Ht 4' 6\" (1.372 m)   Wt 200 lb (90.7 kg)   SpO2 94%   BMI 48.22 kg/m²   General Appearance: alert and oriented to person, place and time and in no acute distress  Skin: warm and dry, mild pallor  Head: normocephalic and atraumatic  Eyes: Mild conjunctival pallor. Pupils equal, round, and reactive to light, extraocular eye movements intact, anicteric sclera  Neck: neck supple and non tender without mass   Pulmonary/Chest: Nonlabored on room air.   Clear to auscultation bilaterally  Cardiovascular: Normal rate, irregular rhythm, S1, S2 with grade 1/6 systolic flow murmur  Abdomen: soft, mild epigastric tenderness without peritoneal signs, non-distended, normal bowel sounds, no masses or organomegaly  Extremities: no cyanosis, no clubbing and no edema  Neurologic: no cranial nerve deficit and speech normal      Recent Labs     02/19/22  1116 02/20/22  0504    137   K 3.2* 3.5   CL 94* 97*   CO2 30* 27   BUN 74* 64*   CREATININE 1.6* 1.5*   GLUCOSE 106* 92   CALCIUM 9.9 9.6       Recent Labs     02/19/22  1116 02/20/22  0504   ALKPHOS 42 40   PROT 7.7 6.8   LABALBU 4.0 3.7   BILITOT 0.6 1.1   AST 17 20   ALT 6 8       Recent Labs     02/19/22  1116 02/19/22  1116 02/19/22  1916 02/20/22  0504 02/20/22  1220   WBC 11.3  --   --  9.9  --    RBC 2.85*  --   --  3.20*  --    HGB 6.7*   < > 6.6* 8.2* 7.9*   HCT 23.8*   < > 22.8* 27.2* 26.4*   MCV 83.5  --   --  85.0  --    MCH 23.5*  --   --  25.6*  --    MCHC 28.2*  --   --  30.1*  --    RDW 18.1*  --   --  17.3*  --      --   --  314  --    MPV 9.6  --   --  9.4  --     < > = values in this interval not displayed. Radiology:   No orders to display       Assessment and Plan:  Principal Problem:    Upper GI hemorrhage  Active Problems:    Acute blood loss anemia    HTN (hypertension)    Atrial fibrillation (HCC)    GERD (gastroesophageal reflux disease)    Hypokalemia    AVM (arteriovenous malformation) of duodenum, acquired  Resolved Problems:    * No resolved hospital problems. *    1. Upper gastrointestinal hemorrhage  -Was on IV PPI overnight, now on oral PPI per GI recommendation  -Continue IV fluids; decrease rate to 50 mL/h    2.  Acute blood loss anemia  -Due to above  - received 2 units PRBC and 1 unit FFP overnight  -PPI as noted  -H&H every 6 hours for now. Transfuse for hemoglobin less than 7, signs of brisk bleeding, or hemodynamic instability     3. Paroxysmal atrial fibrillation  -Hold anticoagulation given acute GI hemorrhage  -She has been on warfarin for several years, and this is at least the second GI hemorrhage she has had.

## 2022-02-20 NOTE — PROGRESS NOTES
Interval H&P    Subjective: NAEON. Patient has no new complaints. Did have one dark bowel movement overnight. Overall feels better today.     Objective:  Vitals:    02/20/22 0745   BP: 116/61   Pulse: 73   Resp: 18   Temp: 98 °F (36.7 °C)   SpO2: 96%     Assessment and Plan:  #Acute blood loss anemia  - Continue IV PPI  - Plan for EGD today; further recommendations to follow    Will discuss with Dr. Michael Gregory DO   GI Fellow  02/20/22 8:55 AM

## 2022-02-20 NOTE — ANESTHESIA PRE PROCEDURE
Department of Anesthesiology  Preprocedure Note       Name:  Carter Stein   Age:  81 y.o.  :  1935                                          MRN:  [id number]         Date:  2022      Surgeon: Hernan Ulloa):  Brenda Buitrago DO    Procedure: Procedure(s):  EGD ESOPHAGOGASTRODUODENOSCOPY    Medications prior to admission:   Prior to Admission medications    Medication Sig Start Date End Date Taking?  Authorizing Provider   omeprazole (PRILOSEC) 20 MG delayed release capsule Take 40 mg by mouth daily    Historical Provider, MD   furosemide (LASIX) 40 MG tablet Take 40 mg by mouth 2 times daily    Historical Provider, MD   metoprolol tartrate (LOPRESSOR) 25 MG tablet Take 1 tablet by mouth 2 times daily 10/26/19   Junior Monet DO   magnesium oxide (MAG-OX) 400 MG tablet Take 400 mg by mouth daily    Historical Provider, MD   ferrous sulfate 325 (65 Fe) MG tablet Take 325 mg by mouth daily (with breakfast)    Historical Provider, MD   allopurinol (ZYLOPRIM) 100 MG tablet Take 100 mg by mouth daily    Historical Provider, MD   warfarin (COUMADIN) 2.5 MG tablet Take 2.5 mg by mouth Indications: 2.5 Mg Monday thru Friday    Historical Provider, MD   warfarin (COUMADIN) 5 MG tablet Take 5 mg by mouth Indications: Saturday & 's    Historical Provider, MD   omeprazole (PRILOSEC) 20 MG capsule TAKE 2 CAPSULES BY MOUTH ONE TIME DAILY -PLEASE REORDER 1 BUSINESS DAY IN ADVANCE USING PHONE NUMBER ABOVE. 9/16/15 9/17/19  David Shaper       Current medications:    Current Facility-Administered Medications   Medication Dose Route Frequency Provider Last Rate Last Admin    0.9 % sodium chloride infusion   IntraVENous PRN Salvador Arnold DO        allopurinol (ZYLOPRIM) tablet 100 mg  100 mg Oral Daily Golden Ocasio DO        ferrous sulfate (IRON 325) tablet 325 mg  325 mg Oral Daily with breakfast Golden Ocsaio DO        magnesium oxide (MAG-OX) tablet 400 mg  400 mg Oral Daily Golden Ocasio DO   400 mg at 02/19/22 1847    [Held by provider] metoprolol tartrate (LOPRESSOR) tablet 25 mg  25 mg Oral BID Golden Raspovic, DO        pantoprazole (PROTONIX) 80 mg in sodium chloride 0.9 % 100 mL infusion  8 mg/hr IntraVENous Continuous Golden Raspovic, DO 10 mL/hr at 02/20/22 0718 8 mg/hr at 02/20/22 0718    sodium chloride flush 0.9 % injection 5-40 mL  5-40 mL IntraVENous 2 times per day Lawrence Dupree, DO   10 mL at 02/19/22 2133    sodium chloride flush 0.9 % injection 5-40 mL  5-40 mL IntraVENous PRN Golden Raspovic, DO        0.9 % sodium chloride infusion  25 mL IntraVENous PRN Golden Raspovic, DO        ondansetron (ZOFRAN-ODT) disintegrating tablet 4 mg  4 mg Oral Q8H PRN Golden Raspovic, DO        Or    ondansetron (ZOFRAN) injection 4 mg  4 mg IntraVENous Q6H PRN Golden Raspovic, DO        polyethylene glycol (GLYCOLAX) packet 17 g  17 g Oral Daily PRN Golden Raspovic, DO        acetaminophen (TYLENOL) tablet 650 mg  650 mg Oral Q6H PRN Golden Raspovic, DO        Or    acetaminophen (TYLENOL) suppository 650 mg  650 mg Rectal Q6H PRN Golden Raspovic, DO        0.9 % sodium chloride infusion   IntraVENous PRN Rudi Pop, DO        0.9 % sodium chloride infusion   IntraVENous Continuous Golden Raspovic,  mL/hr at 02/20/22 0717 New Bag at 02/20/22 0717    0.9 % sodium chloride infusion   IntraVENous PRN Golden Raspovic, DO        0.9 % sodium chloride infusion   IntraVENous PRN Mukarram Amine, DO           Allergies:     Allergies   Allergen Reactions    Amoxicillin Hives    Iodine     Shellfish-Derived Products Hives     SEVERE HIVES WITH SHRIMP    Sodium Bicarbonate      Pt was throwing up while taking       Problem List:    Patient Active Problem List   Diagnosis Code    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    Outpatient observation status Z04.9    HTN (hypertension) I10    Diverticulosis of colon K57.30    Degenerative joint disease of sacroiliac [facility]) M46.1    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    Subjective tinnitus of both ears H93.13    Bilateral sensorineural hearing loss H90.3    Atrial fibrillation (HCC) I48.91    GERD (gastroesophageal reflux disease) K21.9    Hyperkalemia E87.5    Open thigh wound, right, initial encounter S71.101A    Upper GI hemorrhage K92.2    Hypokalemia E87.6    Acute blood loss anemia D62       Past Medical History:        Diagnosis Date    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  Moderate dilation of RA Moderate TR RV systolic pressure = 60 mm Hg     Bilateral sensorineural hearing loss 01/29/2015    Degenerative joint disease of sacroiliac region ([facility] [address] 03/29/2013    HTN (hypertension) 01/19/2012    Morbid obesity with BMI of 50.0-59.9, adult ([facility] [address] [facility]     Peptic ulcer disease     Stage 3b chronic kidney disease ([facility] [address]        Past Surgical History:        Procedure Laterality Date    CARDIAC CATHETERIZATION  ~2005    CATARACT REMOVAL WITH IMPLANT Bilateral ~2007    CHOLECYSTECTOMY, OPEN  1995    LEG BIOPSY EXCISION Right 10/24/2019    EXCISIONAL DEBRIDEMENT RIGHT LEG performed by Karyn Mullins MD at [address] [facility] Bilateral 2008    TUBAL LIGATION  1966       Social History:    Social History     Tobacco Use    Smoking status: Former Smoker    Smokeless tobacco: Never Used    Tobacco comment: social smoker quit 1980       Substance Use Topics    Alcohol use: No     Alcohol/week: 0.0 standard drinks                                Counseling given: Not Answered  Comment: social smoker quit 1980          Vital Signs (Current):   Vitals:    02/20/22 0124 02/20/22 0156 02/20/22 0353 02/20/22 0745   BP: (!) 101/52 (!) 106/54 (!) 100/40 116/61   Pulse: 95 97 80 73   Resp: 19 18 10 18   Temp: 98.6 °F (37 °C) 98.6 °F (37 °C) 98.6 °F (37 °C) 98 °F (36.7 °C) and lower dentures      Pulmonary:Negative Pulmonary ROS breath sounds clear to auscultation      (-) not a current smoker                           Cardiovascular:    (+) hypertension:, dysrhythmias: atrial fibrillation,       ECG reviewed  Rhythm: irregular  Rate: normal  Echocardiogram reviewed               ROS comment: ECHO 10/22/19  Summary   Left ventricular size is grossly normal.   Normal left ventricular wall thickness. Ejection fraction is visually estimated at 50%. No evidence of left ventricular mass or thrombus noted. The left atrium is moderately dilated. Interatrial septum appears intact. No evidence of thrombus within left atrium. Moderately dilated right ventricle. No evidence of a thrombus in the right ventricle. Moderately enlarged right atrium size. Moderate to severe mitral regurgitation is present. No mitral valve stenosis present. Mitral annular calcification is present. The aortic valve appears mildly sclerotic. Aortic valve opens well. Physiologic and/or trace aortic regurgitation is noted. No hemodynamically significant aortic stenosis is present. Mild tricuspid regurgitation. RVSP is 62.3 mmHg. Pulmonary hypertension is moderate to severe . Regular rhythm. Neuro/Psych:                ROS comment: Bilateral sensorineural hearing loss GI/Hepatic/Renal:   (+) GERD:, PUD, renal disease: CRI, morbid obesity         ROS comment: Diverticulosis of colon. Endo/Other:    (+) blood dyscrasia (HGB 8.2): anemia, arthritis: OA., .                 Abdominal:   (+) obese,           Vascular: negative vascular ROS. Other Findings:           Anesthesia Plan      MAC     ASA 3       Induction: intravenous. Anesthetic plan and risks discussed with patient. Plan discussed with CRNA.                   Tammy Watters MD   2/20/2022

## 2022-02-20 NOTE — OP NOTE
EGD Operative Note      Patient: Giovanni Kincaid  YOB: 1935  MRN: [id number]    Date of Procedure: 2/20/2022    Pre-Op Diagnosis: anemia, melena    Post-Op Diagnosis: Gastritis, duodenal AVM, schatzki's ring, hiatal hernia       Procedure(s):  EGD CONTROL HEMORRHAGE    Surgeon(s):  Dejah Gonsalves DO    Assistant:   Surgical Assistant: Glenis Osorio RN  Fellow: Alba Gutierrez DO    Anesthesia: Monitor Anesthesia Care    Estimated Blood Loss (mL): Minimal    Complications: None    Specimens:   * No specimens in log *    Implants:  * No implants in log *      Procedure:  Esophagogastroduodenoscopy    Consent:   Informed consent was obtained from the patient including and not limited to risk of perforation, aspiration of gastric contents or teeth, bleeding, infection, dental breakage, ileus, need for surgery, or worst case death. Sedation:  MAC    Endoscope was advanced easily through mouth to second portion of duodenum    Oropharynx views are limited but grossly normal.    Esophagus:  Mucosa is normal.  GEJ at 40 cm. Small schatzki's ring at 38cm. 2cm hiatal hernia    Stomach:   Antrum with mild linear gastritis  Gastric body is normal.  Retroflexed views show normal fundus and cardia, with few small gastric polyps    Duodenum:  Bulb is normal.  Second portion of duodenum with one small nonbleeding AVM, ablated with APC at 30w 1 lpm    IMPRESSION AND PLAN:     1. Hiatal hernia    2. Schatzki's ring    3. Mild antral gastritis    4. Duodenal AVM    Recommendations: No source of anemia identified on EGD. One small AVM in duodenum, nonbleeding, unlikely to be source of anemia, but portends likely AVMs elsewhere. Consider inpatient colonoscopy if patient is agreeable, otherwise, if patient remains hemodynamically stable, can consider outpatient - but ultimately would recommend colonoscopy prior to re-initiating anticoagulants. Okay for CLD now.  Discontinue IV PPI, continue PO once daily.    Follow up as outpatient in office, call [phone] to schedule for appointment. Pt was seen and procedure was performed with Dr. Sulaiman Parr present for the entire procedure        Electronically signed by Paulino Ramos DO on 2/20/2022 at 9:47 AM     Patient seen and examined independently. Discussed with fellow and agree with the plan of care stated above.     Loren Burgos DO  2/20/2022  9:55 AM

## 2022-02-21 ENCOUNTER — ANESTHESIA EVENT (OUTPATIENT)
Dept: ENDOSCOPY | Age: 87
DRG: 378 | End: 2022-02-21
Payer: MEDICARE

## 2022-02-21 ENCOUNTER — ANESTHESIA (OUTPATIENT)
Dept: ENDOSCOPY | Age: 87
DRG: 378 | End: 2022-02-21
Payer: MEDICARE

## 2022-02-21 VITALS — OXYGEN SATURATION: 99 % | SYSTOLIC BLOOD PRESSURE: 125 MMHG | DIASTOLIC BLOOD PRESSURE: 69 MMHG

## 2022-02-21 LAB
ANION GAP SERPL CALCULATED.3IONS-SCNC: 11 MMOL/L (ref 7–16)
BLOOD BANK DISPENSE STATUS: NORMAL
BLOOD BANK PRODUCT CODE: NORMAL
BPU ID: NORMAL
BUN BLDV-MCNC: 45 MG/DL (ref 6–23)
CALCIUM SERPL-MCNC: 8.5 MG/DL (ref 8.6–10.2)
CHLORIDE BLD-SCNC: 100 MMOL/L (ref 98–107)
CO2: 24 MMOL/L (ref 22–29)
CREAT SERPL-MCNC: 1.3 MG/DL (ref 0.5–1)
DESCRIPTION BLOOD BANK: NORMAL
GFR AFRICAN AMERICAN: 47
GFR NON-AFRICAN AMERICAN: 39 ML/MIN/1.73
GLUCOSE BLD-MCNC: 82 MG/DL (ref 74–99)
HCT VFR BLD CALC: 23.1 % (ref 34–48)
HCT VFR BLD CALC: 25.6 % (ref 34–48)
HCT VFR BLD CALC: 26.5 % (ref 34–48)
HCT VFR BLD CALC: 28.3 % (ref 34–48)
HEMOGLOBIN: 6.9 G/DL (ref 11.5–15.5)
HEMOGLOBIN: 7.6 G/DL (ref 11.5–15.5)
HEMOGLOBIN: 8 G/DL (ref 11.5–15.5)
HEMOGLOBIN: 8.4 G/DL (ref 11.5–15.5)
INR BLD: 1.7
MAGNESIUM: 2 MG/DL (ref 1.6–2.6)
MCH RBC QN AUTO: 25.4 PG (ref 26–35)
MCHC RBC AUTO-ENTMCNC: 29.9 % (ref 32–34.5)
MCV RBC AUTO: 84.9 FL (ref 80–99.9)
PDW BLD-RTO: 17.8 FL (ref 11.5–15)
PLATELET # BLD: 273 E9/L (ref 130–450)
PMV BLD AUTO: 9.4 FL (ref 7–12)
POTASSIUM SERPL-SCNC: 2.9 MMOL/L (ref 3.5–5)
PROTHROMBIN TIME: 20.5 SEC (ref 9.3–12.4)
RBC # BLD: 2.72 E12/L (ref 3.5–5.5)
REASON FOR REJECTION: NORMAL
REJECTED TEST: NORMAL
SODIUM BLD-SCNC: 135 MMOL/L (ref 132–146)
WBC # BLD: 8.9 E9/L (ref 4.5–11.5)

## 2022-02-21 PROCEDURE — 7100000011 HC PHASE II RECOVERY - ADDTL 15 MIN: Performed by: INTERNAL MEDICINE

## 2022-02-21 PROCEDURE — 6370000000 HC RX 637 (ALT 250 FOR IP): Performed by: INTERNAL MEDICINE

## 2022-02-21 PROCEDURE — 2580000003 HC RX 258: Performed by: INTERNAL MEDICINE

## 2022-02-21 PROCEDURE — 99232 SBSQ HOSP IP/OBS MODERATE 35: CPT | Performed by: INTERNAL MEDICINE

## 2022-02-21 PROCEDURE — 0DJD8ZZ INSPECTION OF LOWER INTESTINAL TRACT, VIA NATURAL OR ARTIFICIAL OPENING ENDOSCOPIC: ICD-10-PCS | Performed by: INTERNAL MEDICINE

## 2022-02-21 PROCEDURE — 2709999900 HC NON-CHARGEABLE SUPPLY: Performed by: INTERNAL MEDICINE

## 2022-02-21 PROCEDURE — 6360000002 HC RX W HCPCS: Performed by: NURSE ANESTHETIST, CERTIFIED REGISTERED

## 2022-02-21 PROCEDURE — 6370000000 HC RX 637 (ALT 250 FOR IP): Performed by: NURSE PRACTITIONER

## 2022-02-21 PROCEDURE — 85014 HEMATOCRIT: CPT

## 2022-02-21 PROCEDURE — 80048 BASIC METABOLIC PNL TOTAL CA: CPT

## 2022-02-21 PROCEDURE — 3700000000 HC ANESTHESIA ATTENDED CARE: Performed by: INTERNAL MEDICINE

## 2022-02-21 PROCEDURE — 2060000000 HC ICU INTERMEDIATE R&B

## 2022-02-21 PROCEDURE — 3609017100 HC EGD: Performed by: INTERNAL MEDICINE

## 2022-02-21 PROCEDURE — 7100000010 HC PHASE II RECOVERY - FIRST 15 MIN: Performed by: INTERNAL MEDICINE

## 2022-02-21 PROCEDURE — 83735 ASSAY OF MAGNESIUM: CPT

## 2022-02-21 PROCEDURE — 3609027000 HC COLONOSCOPY: Performed by: INTERNAL MEDICINE

## 2022-02-21 PROCEDURE — 85610 PROTHROMBIN TIME: CPT

## 2022-02-21 PROCEDURE — 2580000003 HC RX 258: Performed by: NURSE ANESTHETIST, CERTIFIED REGISTERED

## 2022-02-21 PROCEDURE — 85027 COMPLETE CBC AUTOMATED: CPT

## 2022-02-21 PROCEDURE — 36415 COLL VENOUS BLD VENIPUNCTURE: CPT

## 2022-02-21 PROCEDURE — 3700000001 HC ADD 15 MINUTES (ANESTHESIA): Performed by: INTERNAL MEDICINE

## 2022-02-21 PROCEDURE — APPSS30 APP SPLIT SHARED TIME 16-30 MINUTES: Performed by: NURSE PRACTITIONER

## 2022-02-21 PROCEDURE — 85018 HEMOGLOBIN: CPT

## 2022-02-21 RX ORDER — POTASSIUM CHLORIDE 20 MEQ/1
40 TABLET, EXTENDED RELEASE ORAL 2 TIMES DAILY
Status: COMPLETED | OUTPATIENT
Start: 2022-02-21 | End: 2022-02-21

## 2022-02-21 RX ORDER — PROPOFOL 10 MG/ML
INJECTION, EMULSION INTRAVENOUS PRN
Status: DISCONTINUED | OUTPATIENT
Start: 2022-02-21 | End: 2022-02-21 | Stop reason: SDUPTHER

## 2022-02-21 RX ORDER — SODIUM CHLORIDE 9 MG/ML
INJECTION, SOLUTION INTRAVENOUS CONTINUOUS PRN
Status: DISCONTINUED | OUTPATIENT
Start: 2022-02-21 | End: 2022-02-21 | Stop reason: SDUPTHER

## 2022-02-21 RX ORDER — SODIUM CHLORIDE 9 MG/ML
INJECTION, SOLUTION INTRAVENOUS PRN
Status: DISCONTINUED | OUTPATIENT
Start: 2022-02-21 | End: 2022-02-24 | Stop reason: HOSPADM

## 2022-02-21 RX ORDER — PROPOFOL 10 MG/ML
INJECTION, EMULSION INTRAVENOUS CONTINUOUS PRN
Status: DISCONTINUED | OUTPATIENT
Start: 2022-02-21 | End: 2022-02-21 | Stop reason: SDUPTHER

## 2022-02-21 RX ADMIN — Medication 5 ML: at 09:00

## 2022-02-21 RX ADMIN — PROPOFOL 120 MCG/KG/MIN: 10 INJECTION, EMULSION INTRAVENOUS at 16:07

## 2022-02-21 RX ADMIN — PHENYLEPHRINE HYDROCHLORIDE 100 MCG: 10 INJECTION INTRAVENOUS at 16:12

## 2022-02-21 RX ADMIN — FERROUS SULFATE TAB 325 MG (65 MG ELEMENTAL FE) 325 MG: 325 (65 FE) TAB at 10:14

## 2022-02-21 RX ADMIN — PHENYLEPHRINE HYDROCHLORIDE 100 MCG: 10 INJECTION INTRAVENOUS at 16:14

## 2022-02-21 RX ADMIN — PANTOPRAZOLE SODIUM 40 MG: 40 TABLET, DELAYED RELEASE ORAL at 06:36

## 2022-02-21 RX ADMIN — ALLOPURINOL 100 MG: 100 TABLET ORAL at 10:14

## 2022-02-21 RX ADMIN — PROPOFOL 40 MG: 10 INJECTION, EMULSION INTRAVENOUS at 16:07

## 2022-02-21 RX ADMIN — POTASSIUM CHLORIDE 40 MEQ: 1500 TABLET, EXTENDED RELEASE ORAL at 21:50

## 2022-02-21 RX ADMIN — POTASSIUM CHLORIDE 40 MEQ: 1500 TABLET, EXTENDED RELEASE ORAL at 10:14

## 2022-02-21 RX ADMIN — MAGNESIUM OXIDE 400 MG: 400 TABLET ORAL at 10:14

## 2022-02-21 RX ADMIN — ACETAMINOPHEN 650 MG: 325 TABLET ORAL at 21:54

## 2022-02-21 RX ADMIN — SODIUM CHLORIDE: 9 INJECTION, SOLUTION INTRAVENOUS at 15:52

## 2022-02-21 ASSESSMENT — PAIN DESCRIPTION - ONSET: ONSET: GRADUAL

## 2022-02-21 ASSESSMENT — PAIN DESCRIPTION - ORIENTATION: ORIENTATION: LOWER

## 2022-02-21 ASSESSMENT — PAIN SCALES - GENERAL
PAINLEVEL_OUTOF10: 1
PAINLEVEL_OUTOF10: 2
PAINLEVEL_OUTOF10: 0
PAINLEVEL_OUTOF10: 1
PAINLEVEL_OUTOF10: 4
PAINLEVEL_OUTOF10: 0
PAINLEVEL_OUTOF10: 0

## 2022-02-21 ASSESSMENT — PAIN DESCRIPTION - PAIN TYPE: TYPE: ACUTE PAIN

## 2022-02-21 ASSESSMENT — PAIN DESCRIPTION - DESCRIPTORS: DESCRIPTORS: CRAMPING

## 2022-02-21 ASSESSMENT — LIFESTYLE VARIABLES: SMOKING_STATUS: 0

## 2022-02-21 ASSESSMENT — PAIN DESCRIPTION - LOCATION: LOCATION: ABDOMEN

## 2022-02-21 NOTE — CARE COORDINATION
SS NOTE: NO COVID TESTING DONE. Pt is on room air. Pt has had 4 units of blood thus far. Her EGD was negative and she is to have a colonoscopy today. PT/ OT are ordered. SW attempted to meet with pt today and she was sleeping soundly and this SW could not awaken her . Will continue efforts to interview her. TETO Grant.2/21/2022.3:16 PM.

## 2022-02-21 NOTE — ANESTHESIA POSTPROCEDURE EVALUATION
Department of Anesthesiology  Postprocedure Note    Patient: Sophy Gallardo  MRN: [id number]  YOB: 1935  Date of evaluation: 2/21/2022  Time:  5:13 PM     Procedure Summary     Date: 02/21/22 Room / Location: [address] [place] [address] 01 / [facility]    Anesthesia Start: 8249 Anesthesia Stop: 1708    Procedures:       COLONOSCOPY (N/A )      EGD ESOPHAGOGASTRODUODENOSCOPY Diagnosis: (ANEMIA)    Surgeons: Zain George MD Responsible Provider: Augie Torres MD    Anesthesia Type: MAC ASA Status: 3          Anesthesia Type: MAC    Nathaly Phase I:      Nathaly Phase II: Nathaly Score: 10    Last vitals: Reviewed and per EMR flowsheets.        Anesthesia Post Evaluation    Patient location during evaluation: bedside  Patient participation: complete - patient participated  Level of consciousness: awake  Pain score: 3  Airway patency: patent  Nausea & Vomiting: no nausea  Complications: no  Cardiovascular status: blood pressure returned to baseline  Respiratory status: acceptable  Hydration status: euvolemic Visit Information Date & Time Provider Department Dept. Phone Encounter #  
 5/10/2017  1:00 PM Domingo Allen, [address] [facility],[address] Internal Medicine [phone] [id number] Upcoming Health Maintenance Date Due DTaP/Tdap/Td series (1 - Tdap) 12/21/1989 PAP AKA CERVICAL CYTOLOGY 12/21/1989 INFLUENZA AGE 9 TO ADULT 8/1/2017 Allergies as of 5/10/2017  Review Complete On: 5/10/2017 By: Alexandre Singh LPN No Known Allergies Current Immunizations  Never Reviewed No immunizations on file. Not reviewed this visit You Were Diagnosed With   
  
 Codes Comments Acquired hypothyroidism    -  Primary ICD-10-CM: E03.9 ICD-9-CM: 244.9 Generalized anxiety disorder     ICD-10-CM: F41.1 ICD-9-CM: 300.02 Vitals BP Pulse Temp Resp Height(growth percentile) Weight(growth percentile)  
 118/62 67 98 °F (36.7 °C) (Oral) 20 5' 6\" (1.676 m) 147 lb (66.7 kg) LMP SpO2 BMI OB Status Smoking Status 04/25/2017 (Exact Date) 99% 23.73 kg/m2 Having regular periods Never Smoker BMI and BSA Data Body Mass Index Body Surface Area  
 23.73 kg/m 2 1.76 m 2 Preferred Pharmacy Pharmacy Name Phone [id number] [phone] Your Updated Medication List  
  
   
This list is accurate as of: 5/10/17  1:54 PM.  Always use your most recent med list.  
  
  
  
  
 clonazePAM 0.5 mg tablet Commonly known as:  Shelba Hock Take 1 Tab by mouth nightly as needed for up to 30 doses. Max Daily Amount: 0.5 mg.  
  
 ferrous sulfate 325 mg (65 mg iron) tablet Take 1 Tab by mouth Daily (before breakfast). levothyroxine 50 mcg tablet Commonly known as:  SYNTHROID Take 1 Tab by mouth Daily (before breakfast). vilazodone 20 mg Tab tablet Commonly known as:  VIIBRYD Take 1 Tab by mouth daily for 90 days. Prescriptions Printed Refills vilazodone (VIIBRYD) 20 mg tab tablet 2 Sig: Take 1 Tab by mouth daily for 90 days. Class: Print Route: Oral  
 clonazePAM (KLONOPIN) 0.5 mg tablet 0 Sig: Take 1 Tab by mouth nightly as needed for up to 30 doses. Max Daily Amount: 0.5 mg.  
 Class: Print Route: Oral  
  
We Performed the Following REFERRAL TO PSYCHIATRY [REF91 Custom] Comments:  
 Please evaluate patient for generalized anxiety To-Do List   
 05/15/2017 Lab:  CBC W/O DIFF   
  
 05/15/2017 Lab:  LIPID PANEL   
  
 05/15/2017 Lab:  METABOLIC PANEL, COMPREHENSIVE   
  
 05/15/2017 Lab:  TSH 3RD GENERATION   
  
 05/15/2017 Lab:  VITAMIN D, 25 HYDROXY Referral Information Referral ID Referred By Referred To  
  
 [id number] FAISAL, 87[address] MD Kamlesh   
   [address] Phone: [phone] Fax: [phone] Visits Status Start Date End Date 1 New Request 5/10/17 5/10/18 If your referral has a status of pending review or denied, additional information will be sent to support the outcome of this decision. Introducing [facility]! Betsy Covarrubias introduces [employer] patient portal. Now you can access parts of your medical record, email your doctor's office, and request medication refills online. 1. In your internet browser, go to [email or web address] 2. Click on the First Time User? Click Here link in the Sign In box. You will see the New Member Sign Up page. 3. Enter your [employer] Access Code exactly as it appears below. You will not need to use this code after youve completed the sign-up process. If you do not sign up before the expiration date, you must request a new code. · [employer] Access Code: [email or web address] Expires: 5/18/2017  2:22 PM 
 
4.  Enter the last four digits of your Social Security Number (xxxx) and Date of Birth (mm/dd/yyyy) as indicated and click Submit. You will be taken to the next sign-up page. 5. Create a Ensyn ID. This will be your [employer] login ID and cannot be changed, so think of one that is secure and easy to remember. 6. Create a Ensyn password. You can change your password at any time. 7. Enter your Password Reset Question and Answer. This can be used at a later time if you forget your password. 8. Enter your e-mail address. You will receive e-mail notification when new information is available in [address] 9. Click Sign Up. You can now view and download portions of your medical record. 10. Click the Download Summary menu link to download a portable copy of your medical information. If you have questions, please visit the Frequently Asked Questions section of the [employer] website. Remember, Ensyn is NOT to be used for urgent needs. For medical emergencies, dial 911. Now available from your iPhone and Android! Please provide this summary of care documentation to your next provider. Your primary care clinician is listed as Jerod Briseno. If you have any questions after today's visit, please call [phone].

## 2022-02-21 NOTE — PROGRESS NOTES
Room #:   0982/7944-20    Date: 2022       Patient Name: Sophy Gallardo  : 1935      MRN: [id number]     Patient unavailable for physical therapy treatment due to off floor for colonoscopy     Dinorah Benites, PT

## 2022-02-21 NOTE — PROGRESS NOTES
Patient arrived to room 635 via cart after having had a colonoscopy and upper GI. Patient is on RA. HR is sinus arrhythmia/sinus rhythmn with PAC's and tenting T waves. Patient's daughter is at bedside and patient states that she had money in her purse that is no longer present and that she is \"missing a bank card\" and money (35 dollars from her purse)\". When this nurse went to see patient, patient was noted to be holding her bank card to cancel card and obtain a new one. Daughter and patient would not allow this nurse to complete an assessment due to her need to call the bank. VSS per charge nurse (on transfer) and patient is currently alert and oriented and sitting up in bedside chair. Patient is on RA with no s/s of acute distress. Denies pain. Will assess when patient will allow.

## 2022-02-21 NOTE — OP NOTE
Colonoscopy Operative Note      Patient: Yohana Pascual  YOB: 1935  MRN: [id number]    Date of Procedure: 2/21/2022    Pre-Op Diagnosis: ANEMIA    Post-Op Diagnosis: Diverticulosis       Procedure(s):  COLONOSCOPY  EGD ESOPHAGOGASTRODUODENOSCOPY    Surgeon(s):  Josiah Rogers MD    Assistant:   Surgical Assistant: Wilman Carrasco RN  Fellow: Lola Stanley DO    Anesthesia: Monitor Anesthesia Care    Estimated Blood Loss (mL): Minimal    Complications: None    Specimens:   * No specimens in log *    Implants:  * No implants in log *      Colonoscopy note    Sedation:  MAC    Adult colonoscope was advanced through anus to sigmoid colon, approximately 40cm in, and encountered a adhesed and rigid area, withdrew entirely and instead advanced a pediatric colonoscope, to the same area, again unable to traverse, so withdrew entirely and instead advanced a gastroscope, with which we were able to traverse the narrow area, all the way to the cecum, which was identified by visualizing the ileocecal valve, but unable to intubate. Preparation is poor, due to diffusely scattered dark liquid stool vs old blood as well as rare solid stool. Patient tolerated procedure well.     Cecum with dark liquid and semisolid stool/debris, no fresh blood; mucosa where visualized appeared normal.    Ascending colon with dark liquid and semisolid stool/debris, no fresh blood; mucosa where visualized appeared normal.    Transverse colon with dark liquid and semisolid stool/debris, no fresh blood; mucosa where visualized appeared normal; few scattered diverticuli    Descending colon with dark liquid and semisolid stool/debris, no fresh blood; mucosa where visualized appeared normal.    Sigmoid colon with dark liquid and semisolid stool/debris, diffusely scattered diverticuli that had clot inside of them, washed out which dilodged them, none actively bleeding, but some bright red liquid blood mixed with stool was noted; mucosa where visualized appeared normal.    Rectum direct views are normal, with dark liquid stool vs old blood mixed with some bright red liquid    Retroflexion in rectum shows normal mucosa and dentate line, small internal hemorrhoids        IMPRESSION AND PLAN:   1. Significant sigmoid diverticulosis, with old blood, and some rare fresh blood in the sigmoid colon/rectum; no actively bleeding lesions identified. 2. Adhesed, rigid sigmoid colon that could not be traversed with adult or pediatric colonoscope    Recommend continue clear liquid diet today; no obviously bleeding lesions identified, though most likely source appears to be sigmoid/descending sigmoid diverticular bleeding. Cannot rule out small bowel source of bleeding considering presence of dark liquid stool/old blood throughout the entire visualized colon. If patient continues to have outward evidence of overt GI bleeding, would recommend NM bleeding scan and general surgery consult. Continue to hold anticoagulation today, and trend H&H q12hr for another day. Pt was seen and procedure was performed with Dr. Rubina Crocker present for the entire procedure.       Electronically signed by Christine Kyle DO on 2/21/2022 at 5:02 PM

## 2022-02-21 NOTE — PLAN OF CARE
Problem:  Bowel Function - Altered:  Goal: Bowel elimination is within specified parameters  Description: Bowel elimination is within specified parameters  2/21/2022 0336 by Marc Roy RN  Outcome: Met This Shift     Problem: Nausea/Vomiting:  Goal: Absence of nausea/vomiting  Description: Absence of nausea/vomiting  2/21/2022 0336 by Marc Roy RN  Outcome: Met This Shift     Problem: Nausea/Vomiting:  Goal: Able to drink  Description: Able to drink  2/21/2022 0336 by Marc Roy RN  Outcome: Met This Shift     Problem: Fluid Volume - Imbalance:  Goal: Will show no signs and symptoms of excessive bleeding  Description: Will show no signs and symptoms of excessive bleeding  2/21/2022 0336 by Marc Roy RN  Outcome: Ongoing     Problem: Fluid Volume - Imbalance:  Goal: Absence of imbalanced fluid volume signs and symptoms  Description: Absence of imbalanced fluid volume signs and symptoms  2/21/2022 0336 by Marc Roy RN  Outcome: Ongoing     Problem: Nausea/Vomiting:  Goal: Able to eat  Description: Able to eat  2/21/2022 0336 by Marc Roy RN  Outcome: Ongoing     Problem: Nausea/Vomiting:  Goal: Ability to achieve adequate nutritional intake will improve  Description: Ability to achieve adequate nutritional intake will improve  2/21/2022 0336 by Marc Roy RN  Outcome: Ongoing

## 2022-02-21 NOTE — PROGRESS NOTES
Called security. Patient would like to file a police report. Per the patient she is missing $35 cash and a bank card.

## 2022-02-21 NOTE — PROGRESS NOTES
Pt had blood transfusions that were reading \"infusing\". Pt currently has no blood or blood products infusing. This RN completed the infusions in the computer with a rate of zero.

## 2022-02-21 NOTE — PROGRESS NOTES
[address] [place], [place] [address]ist   Progress Note    Admitting Date and Time: 2/19/2022 11:06 AM  Admit Dx: Upper GI hemorrhage [K92.2]    Subjective:    Patient reports drinking all of her bowel prep. She said that her stool is liquid and bloody. She is for a colonoscopy today. ROS: denies fever, chills, cp, sob, n/v, HA unless stated above.      potassium chloride  40 mEq Oral BID    pantoprazole  40 mg Oral QAM AC    allopurinol  100 mg Oral Daily    ferrous sulfate  325 mg Oral Daily with breakfast    magnesium oxide  400 mg Oral Daily    [Held by provider] metoprolol tartrate  25 mg Oral BID    sodium chloride flush  5-40 mL IntraVENous 2 times per day     sodium chloride, , PRN  sodium chloride, , PRN  sodium chloride flush, 5-40 mL, PRN  sodium chloride, 25 mL, PRN  ondansetron, 4 mg, Q8H PRN   Or  ondansetron, 4 mg, Q6H PRN  polyethylene glycol, 17 g, Daily PRN  acetaminophen, 650 mg, Q6H PRN   Or  acetaminophen, 650 mg, Q6H PRN  sodium chloride, , PRN  sodium chloride, , PRN  sodium chloride, , PRN         Objective:    BP (!) 118/48   Pulse 84   Temp 98.1 °F (36.7 °C) (Oral)   Resp 18   Ht 4' 6\" (1.372 m)   Wt 200 lb (90.7 kg)   SpO2 96%   BMI 48.22 kg/m²   General Appearance: alert and oriented to person, place and time and in no acute distress  Skin: warm and dry, pallor  Head: normocephalic and atraumatic  Eyes: pupils equal, round, and reactive to light, conjunctivae normal  Neck: neck supple and non tender without mass   Pulmonary/Chest: clear to auscultation bilaterally- no wheezes, rales or rhonchi, no respiratory distress  Cardiovascular: irregularly irregular, murmur  Abdomen: soft, non-tender, non-distended, normal bowel sounds  Extremities: no cyanosis, no clubbing and no edema  Neurologic: no cranial nerve deficit and speech normal      Recent Labs     02/19/22  1116 02/20/22  0504 02/21/22  0533    137 135   K 3.2* 3.5 2.9*   CL 94* 97* 100   CO2 30* 27 24   BUN 74* 64* 45*   CREATININE 1.6* 1.5* 1.3*   GLUCOSE 106* 92 82   CALCIUM 9.9 9.6 8.5*       Recent Labs     02/19/22  1116 02/20/22  0504   ALKPHOS 42 40   PROT 7.7 6.8   LABALBU 4.0 3.7   BILITOT 0.6 1.1   AST 17 20   ALT 6 8       Recent Labs     02/19/22  1116 02/19/22  1916 02/20/22  0504 02/20/22  1220 02/20/22  1719 02/20/22  2302 02/21/22  0533   WBC 11.3  --  9.9  --   --   --  8.9   RBC 2.85*  --  3.20*  --   --   --  2.72*   HGB 6.7*   < > 8.2*   < > 7.8* 7.3* 6.9*   HCT 23.8*   < > 27.2*   < > 25.4* 24.0* 23.1*   MCV 83.5  --  85.0  --   --   --  84.9   MCH 23.5*  --  25.6*  --   --   --  25.4*   MCHC 28.2*  --  30.1*  --   --   --  29.9*   RDW 18.1*  --  17.3*  --   --   --  17.8*     --  314  --   --   --  273   MPV 9.6  --  9.4  --   --   --  9.4    < > = values in this interval not displayed. Radiology:   No orders to display       Assessment:  Principal Problem:    Upper GI hemorrhage  Active Problems:    HTN (hypertension)    Atrial fibrillation (HCC)    GERD (gastroesophageal reflux disease)    Hypokalemia    Acute blood loss anemia    AVM (arteriovenous malformation) of duodenum, acquired  Resolved Problems:    * No resolved hospital problems. *      Plan:  1. Upper gastrointestinal hemorrhage: GI following. S/p EGD on 2/20 with findings of gastritis, duodenal AVM, Schatzki's ring, hiatal hernia. For colonoscopy today. Continue PPI. 2. Acute blood loss anemia: Due to above. She is receiving her 4th unit of PRBCs and received one unit of FFP. 3. Paroxysmal atrial fibrillation: INR is 1.7. Coumadin has been on hold. Patient inquired about changing to apixaban and can be considered once GI bleeding has been addressed and resolved. Betablocker has been on hold due to blood pressure being soft. 4. Hypokalemia:  Supplement and recheck in am.   5. GERD: PPI  6. Hypertension:  BP has been soft. Metoprolol and Lasix are on hold.    7. Chronic kidney disease stage 3b:  Creatinine is stable at 1.3. NOTE: This report was transcribed using voice recognition software. Every effort was made to ensure accuracy; however, inadvertent computerized transcription errors may be present.      Electronically signed by HARVEY Crenshaw CNP on 2/21/2022 at 10:24 AM

## 2022-02-21 NOTE — ANESTHESIA PRE PROCEDURE
Department of Anesthesiology  Preprocedure Note       Name:  Giovanni Kincaid   Age:  81 y.o.  :  1935                                          MRN:  [id number]         Date:  2022      Surgeon: Clark Tapia):  Lukas Ca MD    Procedure: Procedure(s):  COLONOSCOPY    Medications prior to admission:   Prior to Admission medications    Medication Sig Start Date End Date Taking? Authorizing Provider   omeprazole (PRILOSEC) 20 MG delayed release capsule Take 40 mg by mouth daily    Historical Provider, MD   furosemide (LASIX) 40 MG tablet Take 40 mg by mouth 2 times daily    Historical Provider, MD   metoprolol tartrate (LOPRESSOR) 25 MG tablet Take 1 tablet by mouth 2 times daily 10/26/19   Smiley Craft DO   magnesium oxide (MAG-OX) 400 MG tablet Take 400 mg by mouth daily    Historical Provider, MD   ferrous sulfate 325 (65 Fe) MG tablet Take 325 mg by mouth daily (with breakfast)    Historical Provider, MD   allopurinol (ZYLOPRIM) 100 MG tablet Take 100 mg by mouth daily    Historical Provider, MD   warfarin (COUMADIN) 2.5 MG tablet Take 2.5 mg by mouth Indications: 2.5 Mg Monday thru Friday    Historical Provider, MD   warfarin (COUMADIN) 5 MG tablet Take 5 mg by mouth Indications: Saturday & 's    Historical Provider, MD   omeprazole (PRILOSEC) 20 MG capsule TAKE 2 CAPSULES BY MOUTH ONE TIME DAILY -PLEASE REORDER 1 BUSINESS DAY IN ADVANCE USING PHONE NUMBER ABOVE. 9/16/15 9/17/19  Karli Handmaty       Current medications:    No current facility-administered medications for this visit. No current outpatient medications on file.      Facility-Administered Medications Ordered in Other Visits   Medication Dose Route Frequency Provider Last Rate Last Admin    0.9 % sodium chloride infusion   IntraVENous PRN Yan Spencer DO        potassium chloride (KLOR-CON M) extended release tablet 40 mEq  40 mEq Oral BID HARVEY Lovett CNP   40 mEq at 22 1014    pantoprazole (PROTONIX) tablet 40 mg  40 mg Oral QAM AC Mukarram Amine, DO   40 mg at 02/21/22 0636    0.9 % sodium chloride infusion   IntraVENous PRN Mehran Krystal, DO        allopurinol (ZYLOPRIM) tablet 100 mg  100 mg Oral Daily Golden Ocasio, DO   100 mg at 02/21/22 1014    ferrous sulfate (IRON 325) tablet 325 mg  325 mg Oral Daily with breakfast Golden Ocasio, DO   325 mg at 02/21/22 1014    magnesium oxide (MAG-OX) tablet 400 mg  400 mg Oral Daily Golden Ocasio, DO   400 mg at 02/21/22 1014    [Held by provider] metoprolol tartrate (LOPRESSOR) tablet 25 mg  25 mg Oral BID Golden Ocasio, DO        sodium chloride flush 0.9 % injection 5-40 mL  5-40 mL IntraVENous 2 times per day [employer], DO   5 mL at 02/20/22 2027    sodium chloride flush 0.9 % injection 5-40 mL  5-40 mL IntraVENous PRN Golden Ocasio, DO        0.9 % sodium chloride infusion  25 mL IntraVENous PRN Golden Rangelvic, DO        ondansetron (ZOFRAN-ODT) disintegrating tablet 4 mg  4 mg Oral Q8H PRN Golden Rasoliviavic, DO        Or    ondansetron (ZOFRAN) injection 4 mg  4 mg IntraVENous Q6H PRN Golden Raspovic, DO        polyethylene glycol (GLYCOLAX) packet 17 g  17 g Oral Daily PRN Golden Rangelvic, DO        acetaminophen (TYLENOL) tablet 650 mg  650 mg Oral Q6H PRN Golden Rangelvic, DO        Or    acetaminophen (TYLENOL) suppository 650 mg  650 mg Rectal Q6H PRN Golden Rasoliviavic, DO        0.9 % sodium chloride infusion   IntraVENous PRN Mehran Krystal, DO        0.9 % sodium chloride infusion   IntraVENous Continuous Golden Ocasio, DO 50 mL/hr at 02/20/22 1511 Rate Change at 02/20/22 1511    0.9 % sodium chloride infusion   IntraVENous PRN Golden Raspovic, DO        0.9 % sodium chloride infusion   IntraVENous PRN Mukarram Amine, DO           Allergies:     Allergies   Allergen Reactions    Amoxicillin Hives    Iodine     Shellfish-Derived Products Hives     SEVERE HIVES WITH SHRIMP    Sodium Bicarbonate Pt was throwing up while taking       Problem List:    Patient Active Problem List   Diagnosis Code    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    Outpatient observation status Z04.9    HTN (hypertension) I10    Diverticulosis of colon K57.30    Degenerative joint disease of sacroiliac region ([address] M46.1    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    Subjective tinnitus of both ears H93.13    Bilateral sensorineural hearing loss H90.3    Atrial fibrillation (HCC) I48.91    GERD (gastroesophageal reflux disease) K21.9    Hyperkalemia E87.5    Open thigh wound, right, initial encounter S71.101A    Upper GI hemorrhage K92.2    Hypokalemia E87.6    Acute blood loss anemia D62    AVM (arteriovenous malformation) of duodenum, 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  Moderate dilation of RA Moderate TR RV systolic pressure = 60 mm Hg     Bilateral sensorineural hearing loss 01/29/2015    Degenerative joint disease of sacroiliac region ([address] 03/29/2013    HTN (hypertension) 01/19/2012    Morbid obesity with BMI of 50.0-59.9, adult ([address]     Peptic ulcer disease     Stage 3b chronic kidney disease ([address]        Past Surgical History:        Procedure Laterality Date    CARDIAC CATHETERIZATION  ~2005    CATARACT REMOVAL WITH IMPLANT Bilateral ~2007    CHOLECYSTECTOMY, OPEN  1995    LEG BIOPSY EXCISION Right 10/24/2019    EXCISIONAL DEBRIDEMENT RIGHT LEG performed by Denver Rucks, MD at [facility] [place], operated by [facility] Bilateral 2008   [address]    UPPER GASTROINTESTINAL ENDOSCOPY N/A 2/20/2022    EGD CONTROL HEMORRHAGE performed by Kneton Soto DO at [address] History:    Social History     Tobacco Use    Smoking status: Former Smoker    Smokeless tobacco: Never Used (If Applicable): No results found for: COVID19        Anesthesia Evaluation  Patient summary reviewed no history of anesthetic complications:   Airway: Mallampati: II  TM distance: >3 FB   Neck ROM: full  Mouth opening: > = 3 FB Dental:    (+) upper dentures and lower dentures      Pulmonary:Negative Pulmonary ROS breath sounds clear to auscultation      (-) not a current smoker                           Cardiovascular:    (+) hypertension:, dysrhythmias: atrial fibrillation,       ECG reviewed  Rhythm: irregular  Rate: normal  Echocardiogram reviewed               ROS comment: ECHO 10/22/19  Summary   Left ventricular size is grossly normal.   Normal left ventricular wall thickness. Ejection fraction is visually estimated at 50%. No evidence of left ventricular mass or thrombus noted. The left atrium is moderately dilated. Interatrial septum appears intact. No evidence of thrombus within left atrium. Moderately dilated right ventricle. No evidence of a thrombus in the right ventricle. Moderately enlarged right atrium size. Moderate to severe mitral regurgitation is present. No mitral valve stenosis present. Mitral annular calcification is present. The aortic valve appears mildly sclerotic. Aortic valve opens well. Physiologic and/or trace aortic regurgitation is noted. No hemodynamically significant aortic stenosis is present. Mild tricuspid regurgitation. RVSP is 62.3 mmHg. Pulmonary hypertension is moderate to severe . Regular rhythm. Neuro/Psych:                ROS comment: Bilateral sensorineural hearing loss GI/Hepatic/Renal:   (+) GERD:, PUD, renal disease: CRI, morbid obesity         ROS comment: Diverticulosis of colon. Endo/Other:    (+) blood dyscrasia (HGB 6.9): anemia, arthritis: OA., .                 Abdominal:   (+) obese,           Vascular: negative vascular ROS.          Other Findings:             Anesthesia Plan      MAC     ASA 3       Induction: intravenous. Anesthetic plan and risks discussed with patient. Plan discussed with CRNA. DOS STAFF ADDENDUM:    Pt seen and examined, chart reviewed (including anesthesia, drug and allergy history). Anesthetic plan, risks, benefits, alternatives, and personnel involved discussed with patient. Patient verbalized an understanding and agrees to proceed. Plan discussed with care team members and agreed upon. Crystal Diggs MD  Staff Anesthesiologist  3:18 PM    Crystal Diggs MD   2/21/2022    Pt seen questions answered plan outlined pt accepting of DNR suspension questions answered plan agrees to proceed with MAC.  Sheri Molina M.D

## 2022-02-21 NOTE — PROGRESS NOTES
Interval H&P    Subjective: ANITRAOTTONIEL Young reports continuous blood mixed with all bowel movements since starting her prep. Denies abdominal pain.     Objective:  Vitals:    02/21/22 0853   BP: (!) 118/48   Pulse: 84   Resp: 18   Temp: 98.1 °F (36.7 °C)   SpO2: 96%     Assessment and Plan:  #Acute blood loss anemia  - Underwent EGD yesterday 2/20/2022 with findings of small nonbleeding duodenal AVM, no source of anemia identified  - Plan for colonoscopy today; further recommendations to follow    Will discuss with Dr. Jesse Gardner DO   GI Fellow  02/21/22 11:24 AM

## 2022-02-22 LAB
ALBUMIN SERPL-MCNC: 3.4 G/DL (ref 3.5–5.2)
ALP BLD-CCNC: 37 U/L (ref 35–104)
ALT SERPL-CCNC: 8 U/L (ref 0–32)
ANION GAP SERPL CALCULATED.3IONS-SCNC: 12 MMOL/L (ref 7–16)
AST SERPL-CCNC: 19 U/L (ref 0–31)
BILIRUB SERPL-MCNC: 0.9 MG/DL (ref 0–1.2)
BILIRUBIN DIRECT: 0.3 MG/DL (ref 0–0.3)
BILIRUBIN, INDIRECT: 0.6 MG/DL (ref 0–1)
BUN BLDV-MCNC: 31 MG/DL (ref 6–23)
CALCIUM SERPL-MCNC: 9 MG/DL (ref 8.6–10.2)
CHLORIDE BLD-SCNC: 106 MMOL/L (ref 98–107)
CO2: 21 MMOL/L (ref 22–29)
CREAT SERPL-MCNC: 1.3 MG/DL (ref 0.5–1)
GFR AFRICAN AMERICAN: 47
GFR NON-AFRICAN AMERICAN: 39 ML/MIN/1.73
GLUCOSE BLD-MCNC: 84 MG/DL (ref 74–99)
HCT VFR BLD CALC: 27.4 % (ref 34–48)
HCT VFR BLD CALC: 27.7 % (ref 34–48)
HCT VFR BLD CALC: 28.7 % (ref 34–48)
HCT VFR BLD CALC: 29.6 % (ref 34–48)
HEMOGLOBIN: 8.1 G/DL (ref 11.5–15.5)
HEMOGLOBIN: 8.3 G/DL (ref 11.5–15.5)
HEMOGLOBIN: 8.3 G/DL (ref 11.5–15.5)
HEMOGLOBIN: 8.7 G/DL (ref 11.5–15.5)
INR BLD: 1.5
MAGNESIUM: 2 MG/DL (ref 1.6–2.6)
MCH RBC QN AUTO: 25.6 PG (ref 26–35)
MCHC RBC AUTO-ENTMCNC: 29.6 % (ref 32–34.5)
MCV RBC AUTO: 86.7 FL (ref 80–99.9)
PDW BLD-RTO: 17.5 FL (ref 11.5–15)
PLATELET # BLD: 285 E9/L (ref 130–450)
PMV BLD AUTO: 9.5 FL (ref 7–12)
POTASSIUM SERPL-SCNC: 4.4 MMOL/L (ref 3.5–5)
PRO-BNP: 7258 PG/ML (ref 0–450)
PROTHROMBIN TIME: 18 SEC (ref 9.3–12.4)
RBC # BLD: 3.16 E12/L (ref 3.5–5.5)
SODIUM BLD-SCNC: 139 MMOL/L (ref 132–146)
TOTAL PROTEIN: 6.2 G/DL (ref 6.4–8.3)
WBC # BLD: 8.7 E9/L (ref 4.5–11.5)

## 2022-02-22 PROCEDURE — 85610 PROTHROMBIN TIME: CPT

## 2022-02-22 PROCEDURE — 97530 THERAPEUTIC ACTIVITIES: CPT | Performed by: PHYSICAL THERAPIST

## 2022-02-22 PROCEDURE — 97165 OT EVAL LOW COMPLEX 30 MIN: CPT | Performed by: OCCUPATIONAL THERAPIST

## 2022-02-22 PROCEDURE — 80076 HEPATIC FUNCTION PANEL: CPT

## 2022-02-22 PROCEDURE — 85027 COMPLETE CBC AUTOMATED: CPT

## 2022-02-22 PROCEDURE — 6370000000 HC RX 637 (ALT 250 FOR IP): Performed by: NURSE PRACTITIONER

## 2022-02-22 PROCEDURE — 6370000000 HC RX 637 (ALT 250 FOR IP): Performed by: INTERNAL MEDICINE

## 2022-02-22 PROCEDURE — 83735 ASSAY OF MAGNESIUM: CPT

## 2022-02-22 PROCEDURE — 36415 COLL VENOUS BLD VENIPUNCTURE: CPT

## 2022-02-22 PROCEDURE — 2580000003 HC RX 258: Performed by: INTERNAL MEDICINE

## 2022-02-22 PROCEDURE — 83880 ASSAY OF NATRIURETIC PEPTIDE: CPT

## 2022-02-22 PROCEDURE — 99233 SBSQ HOSP IP/OBS HIGH 50: CPT | Performed by: INTERNAL MEDICINE

## 2022-02-22 PROCEDURE — 2060000000 HC ICU INTERMEDIATE R&B

## 2022-02-22 PROCEDURE — 85018 HEMOGLOBIN: CPT

## 2022-02-22 PROCEDURE — 97530 THERAPEUTIC ACTIVITIES: CPT | Performed by: OCCUPATIONAL THERAPIST

## 2022-02-22 PROCEDURE — 85014 HEMATOCRIT: CPT

## 2022-02-22 PROCEDURE — 80048 BASIC METABOLIC PNL TOTAL CA: CPT

## 2022-02-22 PROCEDURE — 97161 PT EVAL LOW COMPLEX 20 MIN: CPT | Performed by: PHYSICAL THERAPIST

## 2022-02-22 RX ADMIN — MAGNESIUM OXIDE 400 MG: 400 TABLET ORAL at 08:12

## 2022-02-22 RX ADMIN — FERROUS SULFATE TAB 325 MG (65 MG ELEMENTAL FE) 325 MG: 325 (65 FE) TAB at 08:12

## 2022-02-22 RX ADMIN — PANTOPRAZOLE SODIUM 40 MG: 40 TABLET, DELAYED RELEASE ORAL at 05:41

## 2022-02-22 RX ADMIN — SODIUM CHLORIDE: 9 INJECTION, SOLUTION INTRAVENOUS at 02:36

## 2022-02-22 RX ADMIN — Medication 10 ML: at 08:12

## 2022-02-22 RX ADMIN — METOPROLOL TARTRATE 25 MG: 25 TABLET, FILM COATED ORAL at 20:48

## 2022-02-22 RX ADMIN — ALLOPURINOL 100 MG: 100 TABLET ORAL at 08:12

## 2022-02-22 RX ADMIN — Medication 10 ML: at 20:49

## 2022-02-22 ASSESSMENT — PAIN SCALES - GENERAL
PAINLEVEL_OUTOF10: 0

## 2022-02-22 NOTE — PROGRESS NOTES
Physical Therapy Initial Evaluation/Plan of Care    Room #:  0635/0635-01  Patient Name: Anna Gomez  YOB: 1935  MRN: [id number]    Date of Service: 2/22/2022     Tentative placement recommendation: Home vs Home with [facility] PT  Equipment recommendation: None      Evaluating Physical Therapist: Aniket Jules, PT, DPT #[id number]      Specific Provider Orders/Date/Referring Provider :     02/21/22 1030   PT eval and treat Start: 02/21/22 1030, End: 02/21/22 1030, ONE TIME, Standing Count: 1 Occurrences, R    Abi Dominguez, APRN - CNP Acknowledge New    Admitting Diagnosis:   Upper GI hemorrhage [K92.2]      Surgery: none  Visit Diagnoses       Codes    Gastrointestinal hemorrhage, unspecified gastrointestinal hemorrhage type    -  Primary K92.2          Patient Active Problem List   Diagnosis    Benign neoplasm of skin of face    Outpatient observation status    HTN (hypertension)    Diverticulosis of colon    Degenerative joint disease of sacroiliac region ([address]    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 Pt generally steady with ambulation but demonstrated fair- safety awareness leaning forward onto the ww during ambulation. Pt performed seated exercises during session with instruction for technique.         PHYSICAL THERAPY  PLAN OF CARE       Physical therapy plan of care is established based on physician order,  patient diagnosis and clinical assessment    Current Treatment Recommendations:    -Bed Mobility: Lower extremity exercises  and Trunk control activities   -Sitting Balance: Incorporate reaching activities to activate trunk muscles , Hands on support to maintain midline , Facilitate active trunk muscle engagement , Facilitate postural control in all planes  and Engage in core activities to allow for movement within base of support   -Standing Balance: Perform strengthening exercises in standing to promote motor control with or without upper extremity support , Instruct patient on adequate base of support to maintain balance and Challenge balance utilizing reaching  activities beyond center of gravity    -Transfers: Provide instruction on proper hand and foot position for adequate transfer of weight onto lower extremities and use of gait device if needed, Cues for hand placement, technique and safety. Provide stabilization to prevent fall , Facilitate weight shift forward on to lower extremities and provide necessary stabilization of bilateral lower extremities , Support transfer of weight on to lower extremities and Assist with extension of knees trunk and hip to accept weight transfer   -Gait: Gait training, Standing activities to improve: base of support, weight shift, weight bearing , Exercises to improve trunk control, Exercises to improve hip and knee control, Performance of protected weight bearing activities and Activities to increase weight bearing   -Endurance: Utilize Supervised activities to increase level of endurance to allow for safe functional mobility including transfers and gait  and Use graduated activities to promote good breathing techniques and provide support and education to maximize respiratory function    PT long term treatment goals are located in below grid    Patient and or family understand(s) diagnosis, prognosis, and plan of care. Frequency of treatments: Patient will be seen  daily.          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 Moderate dilation of RA Moderate TR RV systolic pressure = 60 mm Hg     Bilateral sensorineural hearing loss 01/29/2015    Degenerative joint disease of sacroiliac region ([facility] 03/29/2013    HTN (hypertension) 01/19/2012    Morbid obesity with BMI of 50.0-59.9, adult ([facility]     Peptic ulcer disease     Stage 3b chronic kidney disease ([facility]      Past Surgical History:   Procedure Laterality Date    CARDIAC CATHETERIZATION  ~2005    CATARACT REMOVAL WITH IMPLANT Bilateral ~2007    CHOLECYSTECTOMY, OPEN  1995    COLONOSCOPY N/A 2/21/2022    COLONOSCOPY performed by Juan M Pineda MD at [address] Right 10/24/2019    EXCISIONAL DEBRIDEMENT RIGHT LEG performed by Karyn Mullins MD at [address] Bilateral 2008   [address] [facility] ENDOSCOPY N/A 2/20/2022    EGD CONTROL HEMORRHAGE performed by Courtney Aaron DO at [address] [facility]  2/21/2022    EGD ESOPHAGOGASTRODUODENOSCOPY performed by Juan M Pineda MD at [address] [facility]:    Precautions:  Up with assistance, falls   Social history: Patient lives alone in a ranch home  with 1 step  to enter without Rail  Tub shower grab bars    Equipment owned: [employer], Stephan Kirkland, [address] and [address] chair,      [address]   17/24    AM-PAC Mobility Inpatient   How much difficulty turning over in bed?: A Little  How much difficulty sitting down on / standing up from a chair with arms?: A Little  How much difficulty moving from lying on back to sitting on side of bed?: A Little  How much help from another person moving to and from a bed to a chair?: A Little  How much help from another person needed to walk in hospital room?: A Little  How much help from another person for climbing 3-5 steps with a railing?: A Lot  AM-PAC Inpatient Mobility Raw Score : 17  AM-PAC Inpatient T-Scale Score : 42.13  Mobility Inpatient CMS 0-100% Score: 50.57  Mobility Inpatient CMS G-Code Modifier : CK    Nursing cleared patient for PT evaluation. The admitting diagnosis and active problem list as listed above have been reviewed prior to the initiation of this evaluation. OBJECTIVE;   Initial Evaluation  Date: 2/22/2022 Treatment Date:     Short Term/ Long Term   Goals   Was pt agreeable to Eval/treatment? Yes  To be met in 2 days   Pain level   5/10  Low back (chronic)     Bed Mobility    Rolling: Not assessed patient in chair    Supine to sit: Not assessed patient in chair    Sit to supine: Not assessed patient in chair    Scooting: Supervision     Rolling: Independent    Supine to sit:  Independent    Sit to supine: Independent    Scooting: Independent     Transfers Sit to stand: Supervision    Sit to stand: Independent    Ambulation    2 x 75 feet using  wheeled walker with Supervision    for walker control, walker approximation, balance and safety   > 150 feet using  wheeled walker with Modified Independent    Stair negotiation: ascended and descended   Not assessed       ROM Within functional limits    Increase range of motion 10% of affected joints    Strength BUE:  refer to OT eval  RLE:  4-/5  LLE:  4-/5  Increase strength in affected mm groups by 1/3 grade   Balance Sitting EOB:  fair +  Dynamic Standing:  fair +  Sitting EOB:  good   Dynamic Standing: good      Patient is Alert & Oriented x person, place, time and situation and follows directions    Sensation:  Patient  denies numbness/tingling   Edema:  no   Endurance: fair     Vitals: room air   Blood Pressure at rest  Blood Pressure during session    Heart Rate at rest  Heart Rate during session    SPO2 at rest 97%  SPO2 during session 97-99%     Patient education  Patient educated on role of Physical Therapy, risks of immobility, safety and plan of care, energy conservation,  importance of mobility while in hospital , ankle pumps, quad set and glut set for edema control, blood clot prevention, importance and purpose of adaptive device and adjusted to proper height for the patient. and safety      Patient response to education:   Pt verbalized understanding Pt demonstrated skill Pt requires further education in this area   Yes Partial Yes      Treatment:  Patient practiced and was instructed/facilitated in the following treatment: Patient Sat edge of bed 15 minutes with Supervision  to increase dynamic sitting balance and activity tolerance. Pt performed bed mobility, transfers, ambulation in hallway, seated exercises. Therapeutic Exercises:  ankle pumps, heel raises, long arc quad and seated marching  x 10 reps x 2 sets       At end of session, patient in chair with   call light and phone within reach,  all lines and tubes intact, nursing notified. Patient would benefit from continued skilled Physical Therapy to improve functional independence and quality of life. Patient's/ family goals   home    Time in  1231  Time out  1308    Total Treatment Time  17 minutes    Evaluation time includes thorough review of current medical information, gathering information on past medical history/social history and prior level of function, completion of standardized testing/informal observation of tasks, assessment of data, and development of Plan of care and goals.      CPT codes:  Low Complexity PT evaluation (48977)  Therapeutic activities (06508)   17 minutes  1 unit(s)    Shay Adame PT

## 2022-02-22 NOTE — PROGRESS NOTES
[address] [place], [place] [address]ist   Progress Note    Admitting Date and Time: 2/19/2022 11:06 AM  Admit Dx: Upper GI hemorrhage [K92.2]    Subjective:    Patient was sitting up in the chair at the time of the exam.  She reports a large amount of flatus post colonoscopy. Her colonoscopy showed significant sigmoid diverticulosis with old blood and rare fresh blood. ROS: denies fever, chills, cp, sob, n/v, HA unless stated above.      pantoprazole  40 mg Oral QAM AC    allopurinol  100 mg Oral Daily    ferrous sulfate  325 mg Oral Daily with breakfast    magnesium oxide  400 mg Oral Daily    [Held by provider] metoprolol tartrate  25 mg Oral BID    sodium chloride flush  5-40 mL IntraVENous 2 times per day     sodium chloride, , PRN  sodium chloride, , PRN  sodium chloride flush, 5-40 mL, PRN  sodium chloride, 25 mL, PRN  ondansetron, 4 mg, Q8H PRN   Or  ondansetron, 4 mg, Q6H PRN  polyethylene glycol, 17 g, Daily PRN  acetaminophen, 650 mg, Q6H PRN   Or  acetaminophen, 650 mg, Q6H PRN  sodium chloride, , PRN  sodium chloride, , PRN  sodium chloride, , PRN         Objective:    BP (!) 133/58   Pulse 87   Temp 98.7 °F (37.1 °C)   Resp 23   Ht 4' 6\" (1.372 m)   Wt 200 lb (90.7 kg)   SpO2 95%   BMI 48.22 kg/m²   General Appearance: alert and oriented to person, place and time and in no acute distress  Skin: warm and dry, pallor  Head: normocephalic and atraumatic  Eyes: pupils equal, round, and reactive to light, conjunctivae normal  Neck: neck supple and non tender without mass   Pulmonary/Chest: clear to auscultation bilaterally- no wheezes, rales or rhonchi, no respiratory distress  Cardiovascular: irregularly irregular, murmur  Abdomen: soft, non-tender, non-distended, normal bowel sounds  Extremities: no cyanosis, no clubbing and 1+ bilateral lower extremity edema  Neurologic: no cranial nerve deficit and speech normal      Recent Labs     02/20/22  0504 02/21/22  0533 02/22/22  0447    135 139   K 3.5 2.9* 4.4   CL 97* 100 106   CO2 27 24 21*   BUN 64* 45* 31*   CREATININE 1.5* 1.3* 1.3*   GLUCOSE 92 82 84   CALCIUM 9.6 8.5* 9.0       Recent Labs     02/19/22  1116 02/20/22  0504 02/22/22  0447   ALKPHOS 42 40 37   PROT 7.7 6.8 6.2*   LABALBU 4.0 3.7 3.4*   BILITOT 0.6 1.1 0.9   AST 17 20 19   ALT 6 8 8       Recent Labs     02/20/22  0504 02/20/22  1220 02/21/22  0533 02/21/22  1409 02/21/22  1958 02/21/22  2322 02/22/22  0447   WBC 9.9  --  8.9  --   --   --  8.7   RBC 3.20*  --  2.72*  --   --   --  3.16*   HGB 8.2*   < > 6.9*   < > 8.4* 7.6* 8.1*   HCT 27.2*   < > 23.1*   < > 28.3* 25.6* 27.4*   MCV 85.0  --  84.9  --   --   --  86.7   MCH 25.6*  --  25.4*  --   --   --  25.6*   MCHC 30.1*  --  29.9*  --   --   --  29.6*   RDW 17.3*  --  17.8*  --   --   --  17.5*     --  273  --   --   --  285   MPV 9.4  --  9.4  --   --   --  9.5    < > = values in this interval not displayed. Radiology:   No orders to display       Assessment:  Principal Problem:    Upper GI hemorrhage  Active Problems:    HTN (hypertension)    Atrial fibrillation (HCC)    GERD (gastroesophageal reflux disease)    Hypokalemia    Acute blood loss anemia    AVM (arteriovenous malformation) of duodenum, acquired  Resolved Problems:    * No resolved hospital problems. *      Plan:  1. Upper gastrointestinal hemorrhage: GI following. S/p EGD on 2/20 with findings of gastritis, duodenal AVM, Schatzki's ring, hiatal hernia. S/p colonoscopy on 2/22 with findings of signifcant sig,oid diverticulosis, with old blood, and some rare fresh blood in the sigmoid colon/rectum, no active bleeding lesion, adhesed rigid sigmoid colon. Possible bleeding scan if patient continues to have overt bleeding. Continue PPI. 2. Acute blood loss anemia: Due to above. She has received 4 units of PRBCs and received one unit of FFP. Hgb is stable at 8.1.  3. Paroxysmal atrial fibrillation: INR is 157. Coumadin has been on hold. Patient inquired about changing to apixaban and can be considered once GI bleeding has been addressed and resolved. 4. GERD: PPI  5. Hypertension:  Resume Metoprolol. Lasix has been on hold. Stop IV fluids due to increased leg edema. 6. Chronic kidney disease stage 3b:  Creatinine is stable at 1.3. NOTE: This report was transcribed using voice recognition software. Every effort was made to ensure accuracy; however, inadvertent computerized transcription errors may be present.      Electronically signed by HARVEY Lugo CNP on 2/22/2022 at 10:53 AM

## 2022-02-22 NOTE — PROGRESS NOTES
PROGRESS NOTE  [facility]  Dr. Rodney Rodriguez MD, Dr. Isabel Rodríguez MD, Dr Danna Rojas, Dr. Marin Schroeder MD, Dr. Ric Espinosa, DO Birdcorina Doris  81 y.o.  female    SUBJECTIVE: Patient underwent colonoscopy yesterday, see below and op note for details. She reports that immediately after the procedure she passed gas and there was some blood in those movements, but otherwise no bowel movements including no hematochezia or melena. No hematemesis.      OBJECTIVE:     BP (!) 133/58   Pulse 87   Temp 98.7 °F (37.1 °C)   Resp 23   Ht 4' 6\" (1.372 m)   Wt 200 lb (90.7 kg)   SpO2 95%   BMI 48.22 kg/m²     Gen: NAD, AAO x 3  HEENT:PEERL, no icterus  Heart: RRR, no M/R/G  Lungs: CTAB  Abd.: soft, NT, ND, BS +, no G/R, no HSM  Extr.: no C/C/E, no bruising         Lab Results   Component Value Date    WBC 8.7 02/22/2022    WBC 8.9 02/21/2022    WBC 9.9 02/20/2022    HGB 8.1 02/22/2022    HGB 7.6 02/21/2022    HGB 8.4 02/21/2022    HCT 27.4 02/22/2022    MCV 86.7 02/22/2022    RDW 17.5 02/22/2022     02/22/2022     02/21/2022     02/20/2022     Lab Results   Component Value Date     02/22/2022    K 4.4 02/22/2022    K 3.2 02/19/2022     02/22/2022    CO2 21 02/22/2022    BUN 31 02/22/2022    CREATININE 1.3 02/22/2022    CALCIUM 9.0 02/22/2022    PROT 6.2 02/22/2022    LABALBU 3.4 02/22/2022    BILITOT 0.9 02/22/2022    BILITOT 1.1 02/20/2022    BILITOT 0.6 02/19/2022    ALKPHOS 37 02/22/2022    ALKPHOS 40 02/20/2022    ALKPHOS 42 02/19/2022    AST 19 02/22/2022    AST 20 02/20/2022    AST 17 02/19/2022    ALT 8 02/22/2022    ALT 8 02/20/2022    ALT 6 02/19/2022     Lab Results   Component Value Date    LIPASE 57 02/19/2022     No results found for: AMYLASE      ASSESSMENT/PLAN:  # Acute blood loss anemia  - Underwent EGD 2/20/2022 with findings of small nonbleeding duodenal AVM, no source of anemia identified  - Underwent colonoscopy 2/21/2022 with findings of significant sigmoid diverticulosis, with old blood throughout the entire colon, and some rare flesh blood in the sigmoid colon/rectum; no actively bleeding lesions identified  - Source of bleeding most likely diverticular; but cannot rule out small bowel etiology; unable to intubate terminal ileum as we had to use a gastroscope due to adhesed, rigid sigmoid colon  - Continue CLD today and continue Q6hr H&H  - If Hgb drops or if she has recurrent overt GI bleeding, check NM bleeding scan  - Continue to monitor off anticoagulation today  - If Hgb remains stable today with no recurrent bleeding, can resume anticoagulation tomorrow and advance back to regular diet tomorrow and likely discharge tomorrow      Will discuss with Dr. Xiao Mckinney DO  GI Fellow   2/22/2022  11:08 AM

## 2022-02-22 NOTE — PROGRESS NOTES
[address] [facility]  [facility]         Date:2022                                                   Patient Name: Di Farrar     MRN: [id number]     : 1935     Room: [facility]1404-       Evaluating OT: Isai Rajan OTR/L; [id number]       Referring Provider and Orders/Date:   OT eval and treat Start: 22 1030, End: 22 1030, ONE TIME, Standing Count: 1 Occurrences, R    Barbara Swenson, APRN - CNP     Diagnosis:   1. 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  Moderate dilation of RA Moderate TR RV systolic pressure = 60 mm Hg     Bilateral sensorineural hearing loss 2015    Degenerative joint disease of sacroiliac region ([facility] [address] [facility] 2013    HTN (hypertension) 2012    Morbid obesity with BMI of 50.0-59.9, adult ([facility] [address] [facility]     Peptic ulcer disease     Stage 3b chronic kidney disease ([facility] [address]           Past Surgical History:   Procedure Laterality Date    CARDIAC CATHETERIZATION  ~    CATARACT REMOVAL WITH IMPLANT Bilateral ~    CHOLECYSTECTOMY, OPEN      LEG BIOPSY EXCISION Right 10/24/2019    EXCISIONAL DEBRIDEMENT RIGHT LEG performed by Shayne Mcnair MD at [address] Bilateral    [address]    UPPER GASTROINTESTINAL ENDOSCOPY N/A 2022    EGD CONTROL HEMORRHAGE performed by Mandy Morrow DO at [facility] ENDOSCOPY       Precautions:  Fall Risk    Recommended placement: home with [facility]    Assessment of current deficits     [x] Functional mobility  [x]ADLs  [x] Strength busy and can not provide a lot of help at DC. Bathroom setup: tub shower with grab bars, shower chair; standard toilet    DME owned: rollator, cane, ww     Prior Level of Function: indep with ADLs , indep with light cleaning with IADLs; ambulated indep with rollator and cane PRN   Driving: yes   Occupation: just retired in January from [employer]   Enjoys: puzzles, family    Pain Level: none  Cognition: A&O: 4/4; Follows 3 step directions-pt perseverating on her money getting stolen and wanting to be DC. Was able to be redirected. Memory:  fair   Sequencing:  fair   Problem solving:  fair   Judgement/safety:  Fair-    AM-[facility] Daily Activity Inpatient   How much help for putting on and taking off regular lower body clothing?: A Lot  How much help for Bathing?: A Lot  How much help for Toileting?: A Lot  How much help for putting on and taking off regular upper body clothing?: A Little  How much help for taking care of personal grooming?: A Little  How much help for eating meals?: A Little  AM-[facility] Inpatient Daily Activity Raw Score: 15  AM-[facility] Inpatient ADL T-Scale Score : 34.69  ADL Inpatient CMS 0-100% Score: 56.46  ADL Inpatient CMS G-Code Modifier : CK     Functional Assessment:     Initial Eval Status  Date: 2/22/2022   Treatment Status  Date: STGs = LTGs  Time frame: 10-14 days   Feeding Set up with decreased coordination. Pt used scissors to open drinks. indep   Grooming Minimal Assist with assist for hair comb back of hair; set up for denture management sitting up in chair  Modified [place]    UB Dressing Minimal Assist with gown management  Independent    LB Dressing Moderate Assist with donning cheyenne socks with difficulty due to ht of bed. Reports ease at home from a lower surface.    Modified [place]    Bathing Moderate Assist with sponge bathing for cheyenne feet and buttocks for thoroughness  Stand by Assist    Toileting Moderate Assist with sunny care for thoroughness  Stand by Assist    Bed Mobility NT with pt up in arm chair upon arrival  Supine to sit: Independent   Sit to supine: Independent    Functional Transfers Stand by Assist from arm chair, 3:1 and walker  Independent    Functional Mobility Stand by Assist with walker for short distance functional mobility throughout the room to simulate house hold distances. Modified [place]    Balance Sitting:     Static:  Fair+    Dynamic:fair  Standing: fair-  Sitting:     Static:  good    Dynamic:fair+  Standing: fair   Activity Tolerance Vitals with activity:room air 95% Hr 115  Standing tolerance 1min average  Increase standing tolerance for >3min with stable vital signs for carry over into toileting, functional tranfers and indep in ADLs   Visual/  Perceptual Glasses: Present; WFL    Reports change in vision since admission: No     NA     Hand Dominance  [x] Right  [] Left    AROM (PROM) Strength Additional Info:    RUE  WFL overall with shoulder limitations due to arthritis PTA 3+/5 good  and fair FMC/dexterity noted during ADL tasks  Opposition [x] Intact [] Impaired  Finger to nose [x] Intact [] Impaired     LUE WFL overall with shoulder limitations due to arthritis PTA 3+/5 good  and fair FMC/dexterity noted during ADL tasks  Opposition [x] Intact [] Impaired  Finger to nose [x] Intact [] Impaired     Hearing: [place]  Sensation:  No c/o numbness or tingling   Tone: WFL   Edema: cheyenne LE    Comments: Upon arrival patient sitting up in arm chair. Pt very distractible and required redirection for participation of therapy services. Pt required min A for most UB ADLs and mod A LB ADLs tasks. Limited with SB A for standing during LB ADLs and functional transfers. The biggest barriers reflect that of functional transfers, functional mobility, UB/LB ADLs, cognition, activity tolerance, balance, safety and strengthening. At end of session, patient sitting up in chair with legs elevated with call light and phone within reach, all lines and tubes intact. Overall patient demonstrated decreased independence and safety during completion of ADL/functional transfer/mobility tasks compared to PLOF. Nursing updated on pt position and status following OT eval. Pt would benefit from continued skilled OT to increase safety and independence with completion of ADL/IADL tasks for functional independence and quality of life. Treatment: OT treatment provided this date includes:   Instruction, education and training on safe facilitation and adapted techniques for completion of ADLs. These include neuromuscular reeducation to facilitate balance/righting reactions,safe functional transfer techniques, proper positioning/alignment to improve interaction with environment and overall function and on adapted techniques/work simplification for completion of ADLs. Education provided on hand/feet placement with arm chair, 3:1, walker and body mechanics for fall prevention. Cues for energy conservation and safety for in the home at OR, including modifications and DME. Extended time to complete all tasks, including skilled monitoring of patient's response during treatment session and vital signs. Prior to and at the end of session, environmental modifications / line management completed for patients safety and efficiency of treatment session. See above for further details. Rehab Potential: Good for established goals     Patient / Family Goal: Return home indep    Patient and/or family were instructed on functional diagnosis, prognosis/goals and OT plan of care. Demonstrated good understanding. Eval Complexity: Low  · History: Brief review of medical records and additional review of physical, cognitive, or psychosocial history related to current functional performance  · Exam: 3+ performance deficits  · Assistance/Modification: Mind assistance or modifications required to perform tasks. May have comorbidities that affect occupational performance.     Time In: 3338  Time Out: 1724  Total Treatment Time: 14    Min Units   OT Eval Low 97165  x  1   OT Eval Medium 04164      OT Eval High 88430      OT Re-Eval [id number]       Therapeutic Ex 40349       Therapeutic Activities 12144  14 1    ADL/Self Care 90732       Orthotic Management 37072       Manual 78014     Neuro Re-Ed 16914       Non-Billable Time          Evaluation Time additionally includes thorough review of current medical information, gathering information on past medical history/social history and prior level of function, interpretation of standardized testing/informal observation of tasks, assessment of data and development of plan of care and goals.             Princess Mena OTR/L; [id number]

## 2022-02-22 NOTE — CARE COORDINATION
SOCIAL WORK / DISCHARGE PLANNING:  COVID testing not done. Sw met with pt at bedside to discuss transition to care. Pt reports to live alone, 1 story home with small step with ramp incline to enter. She has rollater and wheeled walker she uses to ambulate. She has home O2 (HCS) which she uses at night prn. She is anxious to return home. She states \" I manage \" when asked about ADLS. / IADLS  PTA. PCP Dr Sharon Gallo in [facility] mail order or Farida Hein. Dc plan remains to return home as previous, denies any dc needs. Family will provide home going transport.                  Electronically signed by TETO Rahman on 2/22/2022 at 12:31 PM

## 2022-02-23 ENCOUNTER — APPOINTMENT (OUTPATIENT)
Dept: NUCLEAR MEDICINE | Age: 87
DRG: 378 | End: 2022-02-23
Payer: MEDICARE

## 2022-02-23 LAB
ANION GAP SERPL CALCULATED.3IONS-SCNC: 10 MMOL/L (ref 7–16)
BUN BLDV-MCNC: 22 MG/DL (ref 6–23)
CALCIUM SERPL-MCNC: 9 MG/DL (ref 8.6–10.2)
CHLORIDE BLD-SCNC: 106 MMOL/L (ref 98–107)
CO2: 22 MMOL/L (ref 22–29)
CREAT SERPL-MCNC: 1.2 MG/DL (ref 0.5–1)
GFR AFRICAN AMERICAN: 52
GFR NON-AFRICAN AMERICAN: 43 ML/MIN/1.73
GLUCOSE BLD-MCNC: 91 MG/DL (ref 74–99)
HCT VFR BLD CALC: 26.1 % (ref 34–48)
HCT VFR BLD CALC: 30.6 % (ref 34–48)
HCT VFR BLD CALC: 31.4 % (ref 34–48)
HEMOGLOBIN: 7.7 G/DL (ref 11.5–15.5)
HEMOGLOBIN: 8.8 G/DL (ref 11.5–15.5)
HEMOGLOBIN: 9 G/DL (ref 11.5–15.5)
INR BLD: 1.6
MCH RBC QN AUTO: 26.3 PG (ref 26–35)
MCHC RBC AUTO-ENTMCNC: 29.5 % (ref 32–34.5)
MCV RBC AUTO: 89.1 FL (ref 80–99.9)
PDW BLD-RTO: 18.1 FL (ref 11.5–15)
PLATELET # BLD: 234 E9/L (ref 130–450)
PMV BLD AUTO: 9.7 FL (ref 7–12)
POTASSIUM SERPL-SCNC: 4.6 MMOL/L (ref 3.5–5)
PROTHROMBIN TIME: 18.4 SEC (ref 9.3–12.4)
RBC # BLD: 2.93 E12/L (ref 3.5–5.5)
SODIUM BLD-SCNC: 138 MMOL/L (ref 132–146)
WBC # BLD: 7.5 E9/L (ref 4.5–11.5)

## 2022-02-23 PROCEDURE — A9560 TC99M LABELED RBC: HCPCS | Performed by: RADIOLOGY

## 2022-02-23 PROCEDURE — 78278 ACUTE GI BLOOD LOSS IMAGING: CPT

## 2022-02-23 PROCEDURE — 3430000000 HC RX DIAGNOSTIC RADIOPHARMACEUTICAL: Performed by: RADIOLOGY

## 2022-02-23 PROCEDURE — 6370000000 HC RX 637 (ALT 250 FOR IP): Performed by: INTERNAL MEDICINE

## 2022-02-23 PROCEDURE — 99233 SBSQ HOSP IP/OBS HIGH 50: CPT | Performed by: INTERNAL MEDICINE

## 2022-02-23 PROCEDURE — 80048 BASIC METABOLIC PNL TOTAL CA: CPT

## 2022-02-23 PROCEDURE — 2580000003 HC RX 258: Performed by: INTERNAL MEDICINE

## 2022-02-23 PROCEDURE — APPSS30 APP SPLIT SHARED TIME 16-30 MINUTES: Performed by: NURSE PRACTITIONER

## 2022-02-23 PROCEDURE — 85014 HEMATOCRIT: CPT

## 2022-02-23 PROCEDURE — 2060000000 HC ICU INTERMEDIATE R&B

## 2022-02-23 PROCEDURE — 36415 COLL VENOUS BLD VENIPUNCTURE: CPT

## 2022-02-23 PROCEDURE — 6370000000 HC RX 637 (ALT 250 FOR IP): Performed by: NURSE PRACTITIONER

## 2022-02-23 PROCEDURE — 85610 PROTHROMBIN TIME: CPT

## 2022-02-23 PROCEDURE — 85018 HEMOGLOBIN: CPT

## 2022-02-23 PROCEDURE — 85027 COMPLETE CBC AUTOMATED: CPT

## 2022-02-23 RX ADMIN — Medication 20 MILLICURIE: at 13:18

## 2022-02-23 RX ADMIN — METOPROLOL TARTRATE 25 MG: 25 TABLET, FILM COATED ORAL at 08:10

## 2022-02-23 RX ADMIN — ACETAMINOPHEN 650 MG: 325 TABLET ORAL at 23:35

## 2022-02-23 RX ADMIN — FERROUS SULFATE TAB 325 MG (65 MG ELEMENTAL FE) 325 MG: 325 (65 FE) TAB at 08:10

## 2022-02-23 RX ADMIN — ALLOPURINOL 100 MG: 100 TABLET ORAL at 08:11

## 2022-02-23 RX ADMIN — Medication 10 ML: at 20:31

## 2022-02-23 RX ADMIN — Medication 10 ML: at 08:11

## 2022-02-23 RX ADMIN — MAGNESIUM OXIDE 400 MG: 400 TABLET ORAL at 08:11

## 2022-02-23 RX ADMIN — PANTOPRAZOLE SODIUM 40 MG: 40 TABLET, DELAYED RELEASE ORAL at 05:57

## 2022-02-23 ASSESSMENT — PAIN SCALES - GENERAL
PAINLEVEL_OUTOF10: 5
PAINLEVEL_OUTOF10: 5
PAINLEVEL_OUTOF10: 0

## 2022-02-23 NOTE — PROGRESS NOTES
Pt had small liquid dark BM with no visible bright red blood. Pt very anxious to go home. Independent with transfer to [facility] and bed. Uses walker for walking.

## 2022-02-23 NOTE — PROGRESS NOTES
Patient is asking for oxygen. Explained to patient her spo2 was fine and did not need it. Patient continues to say she wears it home when she wants and thinks it 3L or whatever she wants. Patient was educated on deep breathing, and was given and shown how to use a incentive spirometer.

## 2022-02-23 NOTE — CARE COORDINATION
SOCIAL WORK / DISCHARGE PLANNING:  COVID testing not done. Sw met with pt at bedside to discuss transition to care. She is anxious for discharge but due to some bleeding dc held. Pt will have bleeding scan today and if negative possible dc next date. Pt has all necessary dme, including nocturnal O2 that she uses prn. Denies any dc needs. Family will provide home going transport.                    Electronically signed by TETO Yu on 2/23/2022 at 12:08 PM

## 2022-02-23 NOTE — PROGRESS NOTES
[address] [place], [place] [address]ist   Progress Note    Admitting Date and Time: 2/19/2022 11:06 AM  Admit Dx: Upper GI hemorrhage [K92.2]    Subjective:    Patient reports that she uses nocturnal oxygen as needed at home. She had a history of sleep apnea and stopped using her equipment because it was old. She said that she had to pay for two machines already and doesn't want to have to pay for another machine. Discussed with patient that she should speak with her PCP regarding this and repeat outpatient sleep study. Her Hgb dropped to 7.7 today. She was having bright red blood from the rectal area yesterday and a dark bowel movement this morning. Spoke with GI and a bleeding scan has been ordered. ROS: denies fever, chills, cp, sob, n/v, HA unless stated above.      pantoprazole  40 mg Oral QAM AC    allopurinol  100 mg Oral Daily    ferrous sulfate  325 mg Oral Daily with breakfast    magnesium oxide  400 mg Oral Daily    metoprolol tartrate  25 mg Oral BID    sodium chloride flush  5-40 mL IntraVENous 2 times per day     technetium labeled red blood cells, 20 millicurie, ONCE PRN  sodium chloride, , PRN  sodium chloride, , PRN  sodium chloride flush, 5-40 mL, PRN  sodium chloride, 25 mL, PRN  ondansetron, 4 mg, Q8H PRN   Or  ondansetron, 4 mg, Q6H PRN  polyethylene glycol, 17 g, Daily PRN  acetaminophen, 650 mg, Q6H PRN   Or  acetaminophen, 650 mg, Q6H PRN  sodium chloride, , PRN  sodium chloride, , PRN  sodium chloride, , PRN         Objective:    BP (!) 119/56   Pulse 66   Temp 98.4 °F (36.9 °C)   Resp 17   Ht 4' 6\" (1.372 m)   Wt 200 lb (90.7 kg)   SpO2 99%   BMI 48.22 kg/m²   General Appearance: alert and oriented to person, place and time and in no acute distress  Skin: warm and dry, pallor  Head: normocephalic and atraumatic  Eyes: pupils equal, round, and reactive to light, conjunctivae normal  Neck: neck supple and non tender without mass   Pulmonary/Chest: clear to auscultation bilaterally- no wheezes, rales or rhonchi, no respiratory distress  Cardiovascular: irregularly irregular, murmur  Abdomen: soft, non-tender, non-distended, normal bowel sounds  Extremities: no cyanosis, no clubbing and 1+ bilateral lower extremity edema  Neurologic: no cranial nerve deficit and speech normal      Recent Labs     02/21/22  0533 02/22/22  0447 02/23/22  0508    139 138   K 2.9* 4.4 4.6    106 106   CO2 24 21* 22   BUN 45* 31* 22   CREATININE 1.3* 1.3* 1.2*   GLUCOSE 82 84 91   CALCIUM 8.5* 9.0 9.0       Recent Labs     02/22/22 0447   ALKPHOS 37   PROT 6.2*   LABALBU 3.4*   BILITOT 0.9   AST 19   ALT 8       Recent Labs     02/21/22  0533 02/21/22  1409 02/22/22  0447 02/22/22  1042 02/22/22  1652 02/22/22  2350 02/23/22  0508   WBC 8.9  --  8.7  --   --   --  7.5   RBC 2.72*  --  3.16*  --   --   --  2.93*   HGB 6.9*   < > 8.1*   < > 8.3* 8.7* 7.7*   HCT 23.1*   < > 27.4*   < > 27.7* 29.6* 26.1*   MCV 84.9  --  86.7  --   --   --  89.1   MCH 25.4*  --  25.6*  --   --   --  26.3   MCHC 29.9*  --  29.6*  --   --   --  29.5*   RDW 17.8*  --  17.5*  --   --   --  18.1*     --  285  --   --   --  234   MPV 9.4  --  9.5  --   --   --  9.7    < > = values in this interval not displayed. Radiology:   NM GI BLOOD LOSS    (Results Pending)       Assessment:  Principal Problem:    Upper GI hemorrhage  Active Problems:    HTN (hypertension)    Atrial fibrillation (HCC)    GERD (gastroesophageal reflux disease)    Hypokalemia    Acute blood loss anemia    AVM (arteriovenous malformation) of duodenum, acquired  Resolved Problems:    * No resolved hospital problems. *      Plan:  1. Upper gastrointestinal hemorrhage:   S/p EGD on 2/20 with findings of gastritis, duodenal AVM, Schatzki's ring, hiatal hernia.   S/p colonoscopy on 2/22 with findings of signifcant sig,oid diverticulosis, with old blood, and some rare fresh blood in the sigmoid colon/rectum, no active bleeding lesion, adhesed rigid sigmoid colon. Hgb is 7.7 today. For a bleeding scan today. Continue PPI. 2. Acute blood loss anemia: Due to above. She has received 4 units of PRBCs and received one unit of FFP. 3. Paroxysmal atrial fibrillation: INR is 1.6. Coumadin has been on hold. Patient inquired about changing to apixaban and can be considered once GI bleeding has been addressed and resolved. 4. GERD: PPI  5. Hypertension:  Continue Metoprolol. Lasix has been on hold. 6. Chronic kidney disease stage 3b:  Creatinine is stable at 1.2. NOTE: This report was transcribed using voice recognition software. Every effort was made to ensure accuracy; however, inadvertent computerized transcription errors may be present.      Electronically signed by HARVEY Nickerson CNP on 2/23/2022 at 12:56 PM

## 2022-02-23 NOTE — PROGRESS NOTES
NM bleeding scan results reviewed, with noted activity in expected location of proximal stomach. Clinically, it is extremely unlikely that gustavo is actually bleeding from her stomach. She did have EGD earlier this hospitalization with no source of bleeding noted, though certainly there could have been a missed dieulafoy lesion, or other subtle or transient/intermittent abnormality. Per RN discussion, patient was having scant amounts of BRBPR yesterday - if in fact this was from a gastric bleed, would have expected significant drop in hemoglobin and associated hemodynamic instability, whereas - in fact - hgb has trended up since this morning on serial H&H, and she has not had any bloody bowel movements today. Plan for today is to continue to monitor H&H, repeat in the morning. If patient does exhibit significant drop in H&H, or if she does have recurrent overt bleeding, will plan to repeat image acquisition, as recommended in the NM bleeding scan report, and consider EGD tomorrow. Otherwise, may be okay for discharge tomorrow. Discussed this plan with patient and her daughter, who are in agreement. Updated RN, and instructed her to call for any overt bleeding, for re-acquisition of NM images.     Discussed with Dr. Kerri Brittle, DO  GI Fellow

## 2022-02-23 NOTE — PROGRESS NOTES
Room #:   2597/7884-29    Date: 2022       Patient Name: Anna Gomez  : 1935      MRN: [id number]     Patient unavailable for physical therapy treatment due to adamant refusal . Patient in too much pain 10/10 low back from testing due to laying flat too long. Will attempt PT treatment at a later time. Thank you.         Shamir Villareal, PT

## 2022-02-23 NOTE — PROGRESS NOTES
PROGRESS NOTE  [facility]  Dr. Mervin Combs MD, Dr. Nancy Méndez MD, Dr Preston Lala, Dr. Frank Ling MD, Dr. Moises Reece, DO      Krissy Gutiérrezor  81 y.o.  female    SUBJECTIVE: Bud iRos. Per patient, no bloody bowel movements since yesterday, but did have one dark bowel movement this morning. Per RN, she was having scant amounts of blood in bowel movements, mostly with wiping, all day yesterday. Patient is anxious to go home.     OBJECTIVE:     BP (!) 119/56   Pulse 66   Temp 98.4 °F (36.9 °C)   Resp 17   Ht 4' 6\" (1.372 m)   Wt 200 lb (90.7 kg)   SpO2 99%   BMI 48.22 kg/m²     Gen: NAD, AAO x 3  HEENT:PEERL, no icterus  Heart: RRR, no M/R/G  Lungs: CTAB  Abd.: soft, NT, ND, BS +, no G/R, no HSM  Extr.: no C/C/E, no bruising         Lab Results   Component Value Date    WBC 7.5 02/23/2022    WBC 8.7 02/22/2022    WBC 8.9 02/21/2022    HGB 7.7 02/23/2022    HGB 8.7 02/22/2022    HGB 8.3 02/22/2022    HCT 26.1 02/23/2022    MCV 89.1 02/23/2022    RDW 18.1 02/23/2022     02/23/2022     02/22/2022     02/21/2022     Lab Results   Component Value Date     02/23/2022    K 4.6 02/23/2022    K 3.2 02/19/2022     02/23/2022    CO2 22 02/23/2022    BUN 22 02/23/2022    CREATININE 1.2 02/23/2022    CALCIUM 9.0 02/23/2022    PROT 6.2 02/22/2022    LABALBU 3.4 02/22/2022    BILITOT 0.9 02/22/2022    BILITOT 1.1 02/20/2022    BILITOT 0.6 02/19/2022    ALKPHOS 37 02/22/2022    ALKPHOS 40 02/20/2022    ALKPHOS 42 02/19/2022    AST 19 02/22/2022    AST 20 02/20/2022    AST 17 02/19/2022    ALT 8 02/22/2022    ALT 8 02/20/2022    ALT 6 02/19/2022     Lab Results   Component Value Date    LIPASE 57 02/19/2022     No results found for: AMYLASE      ASSESSMENT/PLAN:  # Acute blood loss anemia  - Underwent EGD 2/20/2022 with findings of small nonbleeding duodenal AVM, no source of anemia identified  - Underwent colonoscopy 2/21/2022 with findings of significant sigmoid diverticulosis, with old blood throughout the entire colon, and some rare flesh blood in the sigmoid colon/rectum; no actively bleeding lesions identified  - Source of bleeding most likely diverticular; but cannot rule out small bowel etiology; unable to intubate terminal ileum as we had to use a gastroscope due to adhesed, rigid sigmoid colon  - Advance to regular diet  - Hgb dropped stlightly today, with reported bloody bowel movements yesterday and dark BM this morning  - Check NM bleeding scan today  - Continue to monitor off anticoagulation today  - If Hgb remains stable today with no recurrent bleeding, and if NM bleeding scan is negative, can resume anticoagulation tomorrow and possible discharge tomorrow      Will discuss with Dr. Corbett Bumpers, DO  GI Fellow   2/23/2022  10:49 AM

## 2022-02-23 NOTE — PLAN OF CARE
Patient's chart updated to reflect:      . -HF discharge instructions.       Denia Bettencourt, RN   Heart Failure Navigator

## 2022-02-24 VITALS
HEART RATE: 64 BPM | BODY MASS INDEX: 46.29 KG/M2 | RESPIRATION RATE: 18 BRPM | SYSTOLIC BLOOD PRESSURE: 126 MMHG | DIASTOLIC BLOOD PRESSURE: 61 MMHG | OXYGEN SATURATION: 99 % | WEIGHT: 200 LBS | HEIGHT: 55 IN | TEMPERATURE: 98.1 F

## 2022-02-24 LAB
ABO/RH: NORMAL
ANTIBODY SCREEN: NORMAL
HCT VFR BLD CALC: 27.5 % (ref 34–48)
HCT VFR BLD CALC: 28.2 % (ref 34–48)
HEMOGLOBIN: 8 G/DL (ref 11.5–15.5)
HEMOGLOBIN: 8.2 G/DL (ref 11.5–15.5)
INR BLD: 1.4
PROTHROMBIN TIME: 15.8 SEC (ref 9.3–12.4)

## 2022-02-24 PROCEDURE — 85610 PROTHROMBIN TIME: CPT

## 2022-02-24 PROCEDURE — 97530 THERAPEUTIC ACTIVITIES: CPT

## 2022-02-24 PROCEDURE — 86850 RBC ANTIBODY SCREEN: CPT

## 2022-02-24 PROCEDURE — 99222 1ST HOSP IP/OBS MODERATE 55: CPT | Performed by: INTERNAL MEDICINE

## 2022-02-24 PROCEDURE — 86900 BLOOD TYPING SEROLOGIC ABO: CPT

## 2022-02-24 PROCEDURE — APPSS45 APP SPLIT SHARED TIME 31-45 MINUTES: Performed by: NURSE PRACTITIONER

## 2022-02-24 PROCEDURE — 85014 HEMATOCRIT: CPT

## 2022-02-24 PROCEDURE — 99239 HOSP IP/OBS DSCHRG MGMT >30: CPT | Performed by: INTERNAL MEDICINE

## 2022-02-24 PROCEDURE — 86901 BLOOD TYPING SEROLOGIC RH(D): CPT

## 2022-02-24 PROCEDURE — 6370000000 HC RX 637 (ALT 250 FOR IP): Performed by: INTERNAL MEDICINE

## 2022-02-24 PROCEDURE — 36415 COLL VENOUS BLD VENIPUNCTURE: CPT

## 2022-02-24 PROCEDURE — 2580000003 HC RX 258: Performed by: INTERNAL MEDICINE

## 2022-02-24 PROCEDURE — 85018 HEMOGLOBIN: CPT

## 2022-02-24 PROCEDURE — 97116 GAIT TRAINING THERAPY: CPT

## 2022-02-24 RX ORDER — FUROSEMIDE 10 MG/ML
40 INJECTION INTRAMUSCULAR; INTRAVENOUS 2 TIMES DAILY
Status: DISCONTINUED | OUTPATIENT
Start: 2022-02-24 | End: 2022-02-24 | Stop reason: HOSPADM

## 2022-02-24 RX ADMIN — Medication 10 ML: at 09:13

## 2022-02-24 RX ADMIN — ACETAMINOPHEN 650 MG: 325 TABLET ORAL at 05:47

## 2022-02-24 RX ADMIN — ALLOPURINOL 100 MG: 100 TABLET ORAL at 09:13

## 2022-02-24 RX ADMIN — FERROUS SULFATE TAB 325 MG (65 MG ELEMENTAL FE) 325 MG: 325 (65 FE) TAB at 09:13

## 2022-02-24 RX ADMIN — PANTOPRAZOLE SODIUM 40 MG: 40 TABLET, DELAYED RELEASE ORAL at 05:47

## 2022-02-24 RX ADMIN — MAGNESIUM OXIDE 400 MG: 400 TABLET ORAL at 09:13

## 2022-02-24 ASSESSMENT — PAIN SCALES - GENERAL
PAINLEVEL_OUTOF10: 5
PAINLEVEL_OUTOF10: 0

## 2022-02-24 NOTE — PROGRESS NOTES
Dr Nina Angeles and Dr Ramirez Seat aware of hemoglobin changes, per Dr Nina Angeles keep pt NPO at this time.

## 2022-02-24 NOTE — CARE COORDINATION
SOCIAL WORK / DISCHARGE PLANNING:  COVID testing not done. Sw met with pt at bedside. She remains anxious for discharge home. She states she has negotiated with Dr to dc soon if Hgb remains stable. Denies any dc needs, has all necessary dme. She spoke with Marc Lim NP this am about concern for hospital bills and recent bankruptcy. Referral called to [employer]. She was in room when Sw visited and pt has been made aware of financial assistance and how to apply. Family will provide home going transport.                  Electronically signed by TETO Ramirez on 2/24/2022 at 12:17 PM

## 2022-02-24 NOTE — CONSULTS
CHIEF COMPLAINT: Atrial fibrillation, not on anticoagulation 2/2 GI bleed    HISTORY OF PRESENT ILLNESS: Patient is a 81 y.o. female seen at the request of Anahy Oseguera MD.  45-year-old female with past medical history of atrial fibrillation on warfarin, stage IIIb chronic kidney disease, hypertension, morbid obesity, peptic ulcer disease presented to the hospital with chief complaint of black starry stools. Patient admitted that over the last month patient has noticed frequent black stool which converted to right blood recently after she got bowel movements. Patient denies dizziness, headache, palpitation, but admitted feeling more fatigued, tired. Patient denies headache, chest pain, shortness of breath, sore throat, fever or chills. Patient was found to have hemoglobin 7.0 and was brought to the ED. Initial work-up in the ED show vitals within normal range, H&H show hemoglobin 6.7, hypokalemia. Stable chronic kidney disease with creatinine 1.3. Patient is admitted to the hospital.  GI is consulted for concern of upper GI bleed. Warfarin is on hold during hospital course. Current INR 1.4 today. Patient denies chest pain, shortness of breath, palpitation during visit today. Patient has not have bowel movement this morning. Hemoglobin 8.2. 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  Moderate dilation of RA Moderate TR RV systolic pressure = 60 mm Hg     Bilateral sensorineural hearing loss 01/29/2015    Degenerative joint disease of sacroiliac region ([address] 03/29/2013    HTN (hypertension) 01/19/2012    Morbid obesity with BMI of 50.0-59.9, adult ([address]     Peptic ulcer disease     Stage 3b chronic kidney disease ([address]        Patient Active Problem List   Diagnosis    Benign neoplasm of skin of face    Outpatient observation status    HTN (hypertension)    Diverticulosis of colon    Degenerative joint disease of sacroiliac region ([address]    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    Subjective tinnitus of both ears    Bilateral sensorineural hearing loss    Atrial fibrillation (HCC)    GERD (gastroesophageal reflux disease)    Hyperkalemia    Open thigh wound, right, initial encounter    Upper GI hemorrhage    Hypokalemia    Acute blood loss anemia    AVM (arteriovenous malformation) of duodenum, acquired    Gastrointestinal hemorrhage       Allergies   Allergen Reactions    Amoxicillin Hives    Iodine     Shellfish-Derived Products Hives     SEVERE HIVES WITH SHRIMP    Sodium Bicarbonate      Pt was throwing up while taking       Current Facility-Administered Medications   Medication Dose Route Frequency Provider Last Rate Last Admin    0.9 % sodium chloride infusion   IntraVENous PRN Fatuma West, DO        pantoprazole (PROTONIX) tablet 40 mg  40 mg Oral QAM AC Mukarram Amine, DO   40 mg at 02/24/22 0547    0.9 % sodium chloride infusion   IntraVENous PRN Sury Luong DO        allopurinol (ZYLOPRIM) tablet 100 mg  100 mg Oral Daily Golden Ocasoi, DO   100 mg at 02/24/22 0913    ferrous sulfate (IRON 325) tablet 325 mg  325 mg Oral Daily with breakfast Golden Ocasio DO   325 mg at 02/24/22 0913    magnesium oxide (MAG-OX) tablet 400 mg  400 mg Oral Daily Golden Ocasio DO   400 mg at 02/24/22 0913    [Held by provider] metoprolol tartrate (LOPRESSOR) tablet 25 mg  25 mg Oral BID HARVEY Tubbs - CNP   25 mg at 02/23/22 0810    sodium chloride flush 0.9 % injection 5-40 mL  5-40 mL IntraVENous 2 times per day Golden Ocasio, DO   10 mL at 02/24/22 0913    sodium chloride flush 0.9 % injection 5-40 mL  5-40 mL IntraVENous PRN Golden Ocasio, DO        0.9 % sodium chloride infusion  25 mL IntraVENous PRN Golden Ocasio, DO        ondansetron (ZOFRAN-ODT) disintegrating tablet 4 mg  4 mg Oral Q8H PRN Golden Sanjeevpovic, DO        Or    ondansetron (ZOFRAN) injection 4 mg  4 mg IntraVENous Q6H PRN Golden Raspovic, DO        polyethylene glycol (GLYCOLAX) packet 17 g  17 g Oral Daily PRN Golden Raspovic, DO        acetaminophen (TYLENOL) tablet 650 mg  650 mg Oral Q6H PRN Golden Raspovic, DO   650 mg at 02/24/22 8796    Or    acetaminophen (TYLENOL) suppository 650 mg  650 mg Rectal Q6H PRN Golden Raspovic, DO        0.9 % sodium chloride infusion   IntraVENous PRN Salvador Courser, DO        0.9 % sodium chloride infusion   IntraVENous PRN Golden Raspovic, DO        0.9 % sodium chloride infusion   IntraVENous PRN Mukarram Amine, DO           Social History     Socioeconomic History    Marital status:      Spouse name:     Number of children: 5    Years of education: Not on file    Highest education level: Not on file   Occupational History    Occupation: CLERICAL    Tobacco Use    Smoking status: Former Smoker    Smokeless tobacco: Never Used    Tobacco comment: social smoker quit 1980       Vaping Use    Vaping Use: Never used   Substance and Sexual Activity    Alcohol use: No     Alcohol/week: 0.0 standard drinks    Drug use: No    Sexual activity: Not Currently   Other Topics Concern    Not on file   Social History Narrative    Not on file     Social Determinants of Health     Financial Resource Strain:     Difficulty of Paying Living Expenses: Not on file   Food Insecurity:     Worried About [address] in the Last Year: Not on file    Sven of Food in the Last Year: Not on file   Transportation Needs:     Lack of Transportation (Medical): Not on file    Lack of Transportation (Non-Medical):  Not on file   Physical Activity:     Days of Exercise per Week: Not on file    Minutes of Exercise per Session: Not on file   Stress:     Feeling of Stress : Not on file   Social Connections:     Frequency of Communication with Friends and Family: Not on file    Frequency of Social Gatherings with Friends and Family: Not on file    Attends [employer] Services: Not on file    Active Member of Clubs or Organizations: Not on file    Attends Club or Organization Meetings: Not on file    Marital Status: Not on file   Intimate Partner Violence:     Fear of Current or Ex-Partner: Not on file    Emotionally Abused: Not on file    Physically Abused: Not on file    Sexually Abused: Not on file   Housing Stability:     Unable to Pay for Housing in the Last Year: Not on file    Number of [place] in the Last Year: Not on file    Unstable Housing in the Last Year: Not on file       Family History   Problem Relation Age of Onset    Cancer Maternal Grandmother         uterine    Heart Disease Mother         CAD    Diabetes Father         SON    Heart Disease Father     Hypertension Sister        Review of Systems:   Heart: as above   Lungs: as above   Eyes: denies changes in vision or discharge. Ears: denies changes in hearing or pain. Nose: denies epistaxis or masses   Throat: denies sore throat or trouble swallowing. Neuro: denies numbness, tingling, tremors. Skin: denies rashes or itching. : denies hematuria, dysuria   GI: denies vomiting, diarrhea   Psych: denies mood changed, anxiety, depression. all others negative. Physical Exam   /61   Pulse 64   Temp 98.1 °F (36.7 °C) (Oral)   Resp 18   Ht 4' 6\" (1.372 m)   Wt 200 lb (90.7 kg)   SpO2 99%   BMI 48.22 kg/m²   Constitutional: Oriented to person, place, and time. Patient is fatigue  Head: Normocephalic and atraumatic. Eyes: EOM are normal. Pupils are equal, round, and reactive to light. Neck: Normal range of motion. Neck supple. No hepatojugular reflux and no JVD present. Carotid bruit is not present. No tracheal deviation present. No thyromegaly present.    Cardiovascular: Irregularly irregular rhythm, no murmur, no friction or rub.  Pulmonary/Chest: Effort normal and breath sounds normal. No respiratory distress. No wheezes. No rales. No tenderness. Abdominal: Soft. Bowel sounds are normal. No distension and no mass. No tenderness. No rebound and no guarding. Musculoskeletal: Normal range of motion. 2+ pitting edema bilaterally  Lymphadenopathy:   No cervical adenopathy. No groin adenopathy. Neurological: Alert and oriented to person, place, and time. Skin: Skin is warm and dry. No rash noted. Not diaphoretic. No erythema. Psychiatric: Normal mood and affect. Behavior is normal.     CBC:   Lab Results   Component Value Date    WBC 7.5 02/23/2022    RBC 2.93 02/23/2022    RBC 4.62 08/27/2014    HGB 8.2 02/24/2022    HCT 28.2 02/24/2022    MCV 89.1 02/23/2022    MCH 26.3 02/23/2022    MCHC 29.5 02/23/2022    RDW 18.1 02/23/2022     02/23/2022    MPV 9.7 02/23/2022     BMP:   Lab Results   Component Value Date     02/23/2022    K 4.6 02/23/2022    K 3.2 02/19/2022     02/23/2022    CO2 22 02/23/2022    BUN 22 02/23/2022    LABALBU 3.4 02/22/2022    CREATININE 1.2 02/23/2022    CALCIUM 9.0 02/23/2022    GFRAA 52 02/23/2022    LABGLOM 43 02/23/2022     Magnesium:    Lab Results   Component Value Date    MG 2.0 02/22/2022     Cardiac Enzymes: No results found for: CKTOTAL, CKMB, CKMBINDEX, TROPHS   PT/INR:    Lab Results   Component Value Date    PROTIME 15.8 02/24/2022    INR 1.4 02/24/2022     TSH:    Lab Results   Component Value Date    TSH 1.960 10/23/2019     Past Medical History:   Diagnosis Date    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  Moderate dilation of RA Moderate TR RV systolic pressure = 60 mm Hg     Bilateral sensorineural hearing loss 01/29/2015    Degenerative joint disease of sacroiliac region [facility]) 03/29/2013    HTN (hypertension) 01/19/2012    Morbid obesity with BMI of 50. 0-59.9, adult [address]     Peptic ulcer disease     Stage 3b chronic kidney disease ([facility]       Cardiac Tests:    EK2021: Atrial fibrillation, T wave decrease in anterior leads    Telemetry: Atrial fibrillation. Heart rate 75      Echocardiogram: 2019    Left ventricular size is grossly normal.   Normal left ventricular wall thickness. Ejection fraction is visually estimated at 50%. No evidence of left ventricular mass or thrombus noted. The left atrium is moderately dilated. Interatrial septum appears intact. No evidence of thrombus within left atrium. Moderately dilated right ventricle. No evidence of a thrombus in the right ventricle. Moderately enlarged right atrium size. Moderate to severe mitral regurgitation is present. No mitral valve stenosis present. Mitral annular calcification is present. The aortic valve appears mildly sclerotic. Aortic valve opens well. Physiologic and/or trace aortic regurgitation is noted. No hemodynamically significant aortic stenosis is present. Mild tricuspid regurgitation. RVSP is 62.3 mmHg. Pulmonary hypertension is moderate to severe . Regular rhythm. ASSESSMENT AND PLAN:  Problem List Items Addressed This Visit     Gastrointestinal hemorrhage - Primary        1. Atrial fibrillation, on warfarin. [id number] Score: 4.    2. HFpEF EF 50%. 3. Upper GI bleed. 4. Acute on chronic anemia 2/2 GI bleed. 5. GERD. 6. Hypertension. Plan:    · Continue hold off on anticoagulation. Monitor H&H, continue iron tablets daily. .  Consider resume anticoagulation versus switch to Eliquis when hemoglobin is stable. · Continue to hold metoprolol for now. .  · Lasix 40 mg IV twice daily. Monitor daily weight, strict I's/O.  · Recheck echocardiogram.  · Continue PPI.       Electronically signed by Geovanny Jaquez MD on 2022 at 1:16 PM    Attending: Dr. Abhijit Jackson

## 2022-02-24 NOTE — PROGRESS NOTES
Patient provided discharge information and AMA paperwork per her request. Patient and daughter both aware of risks, IV removed.

## 2022-02-24 NOTE — DISCHARGE SUMMARY
[facility] Physician Discharge Summary       See Toure MD  [address] [place]  [address] [facility] [phone]    Call today  Heart Failure follow up and education. , Please set a visit within 3-7 days of hopstial discharge      Activity level: Activity as tolerated    Diet: ADULT DIET; Regular; Low Sodium (2 gm)    Labs:CBC in two days    Condition at discharge: Stable    Dispo:Patient left against medical advice    Continue supplemental oxygen via nasal canula @ 3 LPM as needed        Patient ID:  Garth Ivan  [id number]  79 y.o.  1935    Admit date: 2/19/2022    Discharge date and time:  2/24/2022  4:38 PM    Admission Diagnoses: Principal Problem:    Upper GI hemorrhage  Active Problems:    HTN (hypertension)    Atrial fibrillation (HCC)    GERD (gastroesophageal reflux disease)    Hypokalemia    Acute blood loss anemia    AVM (arteriovenous malformation) of duodenum, acquired    Gastrointestinal hemorrhage    Acute decompensated heart failure (HCC)    Acute on chronic heart failure with preserved ejection fraction (HCC)    RVF (right ventricular failure) ([address]    Pulmonary hypertension ([address]  Resolved Problems:    * No resolved hospital problems. *      Discharge Diagnoses: Principal Problem:    Upper GI hemorrhage  Active Problems:    HTN (hypertension)    Atrial fibrillation (HCC)    GERD (gastroesophageal reflux disease)    Hypokalemia    Acute blood loss anemia    AVM (arteriovenous malformation) of duodenum, acquired    Gastrointestinal hemorrhage    Acute decompensated heart failure (HCC)    Acute on chronic heart failure with preserved ejection fraction (HCC)    RVF (right ventricular failure) ([address]    Pulmonary hypertension ([address]  Resolved Problems:    * No resolved hospital problems.  *      Consults:  IP CONSULT TO GI  IP CONSULT TO CARDIOLOGY    Procedures: EGD, Colonoscopy, bleeding scan    Hospital Course: Luisa Haile is an 81 year old female that presented to the Emergency Department with melena and hematochezia. She had outpatient lab work done and her Hgb had dropped from 9 the week prior to 7. Upon arrival to the ED her Hgb was 6.7. She was given a PPI bolus in ED and started on a continuous PPI drip on admission. GI was consulted and she was ordered one unit of PRBCs. Her Coumadin was placed on hold. Her INR was 1.9 and she was transfused with one unit of platelets for an INR of 1.9. On 2/20/22 she had an EGD that revealed a hiatal hernia, Schatki's ring, mild antral gastritis and duodenal AVM. She was transitioned to oral PPI. Her repeat Hgb was 6.6 and she received an additional 2 units  of PRBCs. Her Hgb did improve to 8.2 after the third unit of PRBCs but dropped to 6.9. She received her fourth unit of PRBCs. A colonoscopy was done on 2/21/22 that showed significant sigmoid diverticulosis with old blood and some rare fresh blood in the sigmoid colon/rectum, adhesed rigid sigmoid colon that could not be traversed with colonoscope. She had bright red blood from her rectum that evening and a dark tarry stool the next day. A bleeding scan was done that showed progressive accumulation of radiotracer in the proximal stomach. The source of the bleeding was felt to most likely be diverticular but small bowel etiology was unable to be rule out as the terminal ileum was unable to be intubated due to adhesed rigid sigmoid colon. She declined a repeat endoscopy. She was advised to remain off of anticoagulation and to have repeat H&H as an outpatient. Her Lasix and metoprolol were initially placed on hold when she presented to the ED with GI bleed. Her blood pressure remained stable and her metoprolol was resumed. She declined resumption of her Lasix even though she had bilateral leg edema and a pro-BNP of 7,258. She did have several pauses on the monitor during the night with the longest being 3.11 seconds.   Her metoprolol was placed back on hold and Cardiology was consulted. Cardiology ordered Lasix 40 mg IV twice a day and recommended resume anticoagulation with Eliquis twice a day when ok with GI. Metoprolol was to remain on hold and the patient was to be observed on the monitor. If she were to have RVR, digoxin could be considered. The patient however did not want to stay for further treatment and observation. The risks of leaving against medical advice were explained to the patient and her daughter, up to and including the risk of mortality. The patient wanted to leave against medical advice despite the risks. She was advised to follow up with her PCP as soon as possible regarding cardiac medication and anticoagulation. Discharge Exam:  Vitals:    02/23/22 2001 02/23/22 2015 02/23/22 2330 02/24/22 0800   BP:  125/73 (!) 125/50 126/61   Pulse: 56 60 63 64   Resp:  17 19 18   Temp:  97.7 °F (36.5 °C) 98.1 °F (36.7 °C) 98.1 °F (36.7 °C)   TempSrc:  Infrared Oral Oral   SpO2:  99% 100% 99%   Weight:       Height:         General Appearance: alert and oriented to person, place and time and in no acute distress  Skin: warm and dry, pallor  Head: normocephalic and atraumatic  Eyes: pupils equal, round, and reactive to light, conjunctivae normal  Neck: neck supple and non tender without mass   Pulmonary/Chest: clear to auscultation bilaterally- no wheezes, rales or rhonchi, no respiratory distress  Cardiovascular: irregularly irregular, murmur  Abdomen: soft, non-tender, non-distended, normal bowel sounds  Extremities: no cyanosis, no clubbing and 1+ bilateral lower extremity edema  Neurologic: no cranial nerve deficit and speech normal    I/O last 3 completed shifts: In: 1080 [P.O.:1080]  Out: -   No intake/output data recorded.       LABS:  Recent Labs     02/22/22  0447 02/23/22  0508    138   K 4.4 4.6    106   CO2 21* 22   BUN 31* 22   CREATININE 1.3* 1.2*   GLUCOSE 84 91   CALCIUM 9.0 9.0       Recent Labs 02/22/22  0447 02/22/22  1042 02/23/22  0508 02/23/22  1220 02/23/22  2302 02/24/22  0005 02/24/22  0446   WBC 8.7  --  7.5  --   --   --   --    RBC 3.16*  --  2.93*  --   --   --   --    HGB 8.1*   < > 7.7*   < > SEE BELOW* 8.0* 8.2*   HCT 27.4*   < > 26.1*   < > SEE BELOW* 27.5* 28.2*   MCV 86.7  --  89.1  --   --   --   --    MCH 25.6*  --  26.3  --   --   --   --    MCHC 29.6*  --  29.5*  --   --   --   --    RDW 17.5*  --  18.1*  --   --   --   --      --  234  --   --   --   --    MPV 9.5  --  9.7  --   --   --   --     < > = values in this interval not displayed. No results for input(s): POCGLU in the last 72 hours. Imaging:  No results found. Patient Instructions:   Current Discharge Medication List      CONTINUE these medications which have NOT CHANGED    Details   furosemide (LASIX) 40 MG tablet Take 40 mg by mouth 2 times daily      magnesium oxide (MAG-OX) 400 MG tablet Take 400 mg by mouth daily      ferrous sulfate 325 (65 Fe) MG tablet Take 325 mg by mouth daily (with breakfast)      allopurinol (ZYLOPRIM) 100 MG tablet Take 100 mg by mouth daily      omeprazole (PRILOSEC) 20 MG capsule TAKE 2 CAPSULES BY MOUTH ONE TIME DAILY -PLEASE REORDER 1 BUSINESS DAY IN ADVANCE USING PHONE NUMBER ABOVE. Qty: 90 capsule, Refills: 3    Associated Diagnoses: GERD (gastroesophageal reflux disease)         STOP taking these medications       omeprazole (PRILOSEC) 20 MG delayed release capsule Comments:   Reason for Stopping:         metoprolol tartrate (LOPRESSOR) 25 MG tablet Comments:   Reason for Stopping:         warfarin (COUMADIN) 2.5 MG tablet Comments:   Reason for Stopping:         warfarin (COUMADIN) 5 MG tablet Comments:   Reason for Stopping:                 Note that more than 30 minutes was spent in preparing discharge papers, discussing discharge with patient, medication review, etc.    NOTE: This report was transcribed using voice recognition software.  Every effort was made to ensure accuracy; however, inadvertent computerized transcription errors may be present.      Signed:  Electronically signed by HARVEY Downing CNP on 2/24/2022 at 4:38 PM

## 2022-02-24 NOTE — PROGRESS NOTES
Dr Mya Monsalve aware of HR low 50's-low 60's, and 2.22 sec pause. Hold lopressor 25 mg for evening dose.

## 2022-02-24 NOTE — PROGRESS NOTES
Physical Therapy Treatment Note/Plan of Care    Room #:  0635/0635-01  Patient Name: Di Farrar  YOB: 1935  MRN: [id number]    Date of Service: 2/24/2022     Tentative placement recommendation: Home vs Home with [facility] PT  Equipment recommendation: None      Evaluating Physical Therapist: Elías Denis, PT, DPT #[id number]      Specific Provider Orders/Date/Referring Provider :     02/21/22 1030   PT eval and treat Start: 02/21/22 1030, End: 02/21/22 1030, ONE TIME, Standing Count: 1 Occurrences, R    Barbara Swenson, APRN - CNP Acknowledge New    Admitting Diagnosis:   Upper GI hemorrhage [K92.2]      Surgery: none      Patient Active Problem List   Diagnosis    Benign neoplasm of skin of face    Outpatient observation status    HTN (hypertension)    Diverticulosis of colon    Degenerative joint disease of sacroiliac region ([address]    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 Pt generally steady with ambulation but did not want to ambulated too far as patient reports increased distance hurt her back last time and she is still recovering from laying flat yesterday for test. The patient will benefit from continued skilled therapy to increase strength and improve balance for safe functional mobility, to decrease risk of falls, and to meet goals at discharge.       PHYSICAL THERAPY  PLAN OF CARE       Physical therapy plan of care is established based on physician order,  patient diagnosis and clinical assessment    Current Treatment Recommendations:    -Bed Mobility: Lower extremity exercises  and Trunk control activities   -Sitting Balance: Incorporate reaching activities to activate trunk muscles , Hands on support to maintain midline , Facilitate active trunk muscle engagement , Facilitate postural control in all planes  and Engage in core activities to allow for movement within base of support   -Standing Balance: Perform strengthening exercises in standing to promote motor control with or without upper extremity support , Instruct patient on adequate base of support to maintain balance and Challenge balance utilizing reaching  activities beyond center of gravity    -Transfers: Provide instruction on proper hand and foot position for adequate transfer of weight onto lower extremities and use of gait device if needed, Cues for hand placement, technique and safety. Provide stabilization to prevent fall , Facilitate weight shift forward on to lower extremities and provide necessary stabilization of bilateral lower extremities , Support transfer of weight on to lower extremities and Assist with extension of knees trunk and hip to accept weight transfer   -Gait: Gait training, Standing activities to improve: base of support, weight shift, weight bearing , Exercises to improve trunk control, Exercises to improve hip and knee control, Performance of protected weight bearing activities and Activities to increase weight bearing   -Endurance: Utilize Supervised activities to increase level of endurance to allow for safe functional mobility including transfers and gait  and Use graduated activities to promote good breathing techniques and provide support and education to maximize respiratory function    PT long term treatment goals are located in below grid    Patient and or family understand(s) diagnosis, prognosis, and plan of care.     Frequency of treatments: Patient will be seen  daily. 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 Moderate dilation of RA Moderate TR RV systolic pressure = 60 mm Hg     Bilateral sensorineural hearing loss 01/29/2015    Degenerative joint disease of sacroiliac region ([facility] 03/29/2013    HTN (hypertension) 01/19/2012    Morbid obesity with BMI of 50.0-59.9, adult ([facility]     Peptic ulcer disease     Stage 3b chronic kidney disease ([facility]      Past Surgical History:   Procedure Laterality Date    CARDIAC CATHETERIZATION  ~2005    CATARACT REMOVAL WITH IMPLANT Bilateral ~2007    CHOLECYSTECTOMY, OPEN  1995    COLONOSCOPY N/A 2/21/2022    COLONOSCOPY performed by Grabiel Miller MD at [address] [facility] [address] Right 10/24/2019    EXCISIONAL DEBRIDEMENT RIGHT LEG performed by Su Chaudhari MD at [address] [facility] Bilateral 2008   [address] [facility] ENDOSCOPY N/A 2/20/2022    EGD CONTROL HEMORRHAGE performed by Amelia Mead DO at [address] [facility]  2/21/2022    EGD ESOPHAGOGASTRODUODENOSCOPY performed by Grabiel Miller MD at [address] [facility]:    Precautions:  Up with assistance, falls   Social history: Patient lives alone in a ranch home  with 1 step  to enter without Rail  Tub shower grab bars    Equipment owned: [employer], [address] and [address] [facility] chair,      [address] [place] [address]   How much difficulty turning over in bed?: A Little  How much difficulty sitting down on / standing up from a chair with arms?: A Little  How much difficulty moving from lying on back to sitting on side of bed?: A Little  How much help from another person moving to and from a bed to a chair?: A Little  How much help from another person needed to walk in hospital room?: A Little  How much help from another person for climbing 3-5 steps with a railing?: A Lot  AM-PAC Inpatient Mobility Raw Score : 17  AM-PAC Inpatient T-Scale Score : 42.13  Mobility Inpatient CMS 0-100% Score: 50.57  Mobility Inpatient CMS G-Code Modifier : CK    Nursing cleared patient for PT treatment. .   OBJECTIVE;   Initial Evaluation  Date: 2/22/2022 Treatment Date:    2/24/2022   Short Term/ Long Term   Goals   Was pt agreeable to Eval/treatment? Yes Yes To be met in 2 days   Pain level   5/10  Low back (chronic) 5/10  Low back     Bed Mobility    Rolling: Not assessed patient in chair    Supine to sit: Not assessed patient in chair    Sit to supine: Not assessed patient in chair    Scooting: Supervision    Rolling: Not assessed patient in chair   Supine to sit: Not assessed patient in chair   Sit to supine: Not assessed patient in chair   Scooting: Not assessed patient in chair    Rolling: Independent    Supine to sit:  Independent    Sit to supine: Independent    Scooting: Independent     Transfers Sit to stand: Supervision   Sit to stand: Supervision       Sit to stand: Independent    Ambulation    2 x 75 feet using  wheeled walker with Supervision    for walker control, walker approximation, balance and safety 60 feet using  wheeled walker with Supervision    cues for walker approximation    > 150 feet using  wheeled walker with Modified Independent    Stair negotiation: ascended and descended   Not assessed       ROM Within functional limits    Increase range of motion 10% of affected joints    Strength BUE:  refer to OT eval  RLE:  4-/5  LLE:  4-/5  Increase strength in affected mm groups by 1/3 grade   Balance Sitting EOB:  fair +  Dynamic Standing:  fair +  Sitting EOB:  good   Dynamic Standing: good      Patient is Alert & Oriented x person, place, time and situation and follows directions    Sensation:  Patient  denies numbness/tingling   Edema:  no   Endurance: fair     Vitals: room air   Blood Pressure at rest  Blood Pressure during session    Heart Rate at rest  Heart Rate during session    SPO2 at rest 99%  SPO2 during session 98-99%     Patient education  Patient educated on role of Physical Therapy, risks of immobility, safety and plan of care, energy conservation,  importance of mobility while in hospital , ankle pumps, quad set and glut set for edema control, blood clot prevention, importance and purpose of adaptive device and adjusted to proper height for the patient. and safety      Patient response to education:   Pt verbalized understanding Pt demonstrated skill Pt requires further education in this area   Yes Partial Yes      Treatment:  Patient practiced and was instructed/facilitated in the following treatment: Patient stood from chair and ambulated as above. Weight shifting and balance activities. Decreased gait speed and step length noted. Pt declined exercises. Performed 3 STS from chair. Patient educated on safety and ways to decrease low back pain during functional mobility. Therapeutic Exercises:  not performed      At end of session, patient in chair with   call light and phone within reach,  all lines and tubes intact, nursing notified. Patient would benefit from continued skilled Physical Therapy to improve functional independence and quality of life.          Patient's/ family goals   home    Time in  1116  Time out  1139    Total Treatment Time  23 minutes    CPT codes:  Therapeutic activities (52897)   10 minutes  1 unit(s)  Gait Training (18428) 13 minutes 1 unit(s)    Corie Avila [place] #[id number]

## 2022-02-24 NOTE — PLAN OF CARE
Problem:  Bowel Function - Altered:  Goal: Bowel elimination is within specified parameters  Description: Bowel elimination is within specified parameters  Outcome: Met This Shift     Problem: Fluid Volume - Imbalance:  Goal: Will show no signs and symptoms of excessive bleeding  Description: Will show no signs and symptoms of excessive bleeding  Outcome: Met This Shift  Goal: Absence of imbalanced fluid volume signs and symptoms  Description: Absence of imbalanced fluid volume signs and symptoms  Outcome: Met This Shift     Problem: Nausea/Vomiting:  Goal: Absence of nausea/vomiting  Description: Absence of nausea/vomiting  Outcome: Met This Shift  Goal: Able to drink  Description: Able to drink  Outcome: Met This Shift  Goal: Able to eat  Description: Able to eat  Outcome: Met This Shift  Goal: Ability to achieve adequate nutritional intake will improve  Description: Ability to achieve adequate nutritional intake will improve  Outcome: Met This Shift     Problem: Pain:  Goal: Pain level will decrease  Description: Pain level will decrease  Outcome: Not Met This Shift  Goal: Control of acute pain  Description: Control of acute pain  Outcome: Not Met This Shift  Goal: Control of chronic pain  Description: Control of chronic pain  Outcome: Not Met This Shift

## 2022-02-24 NOTE — PROGRESS NOTES
PROGRESS NOTE  [facility]  Dr. Nic Cornelius MD, Dr. Orion Delarosa MD, Dr Gracie Lim, Dr. Kike Enamorado MD, Dr. Loren Burgos, DO      [facility]  81 y.[facility].  female    SUBJECTIVE: [place]. Per patient, no bowel movements yesterday after the morning, and none overnight, and none this morning, including no bloody bowel movements and no dark/black stools. She is requesting discharge, and would like to go home.     OBJECTIVE:     /61   Pulse 64   Temp 98.1 °F (36.7 °C) (Oral)   Resp 18   Ht 4' 6\" (1.372 m)   Wt 200 lb (90.7 kg)   SpO2 99%   BMI 48.22 kg/m²     Gen: NAD, AAO x 3  HEENT:PEERL, no icterus  Heart: RRR, no M/R/G  Lungs: CTAB  Abd.: soft, NT, ND, BS +, no G/R, no HSM  Extr.: no C/C/E, no bruising         Lab Results   Component Value Date    WBC 7.5 02/23/2022    WBC 8.7 02/22/2022    WBC 8.9 02/21/2022    HGB 8.2 02/24/2022    HGB 8.0 02/24/2022    HGB SEE BELOW 02/23/2022    HCT 28.2 02/24/2022    MCV 89.1 02/23/2022    RDW 18.1 02/23/2022     02/23/2022     02/22/2022     02/21/2022     Lab Results   Component Value Date     02/23/2022    K 4.6 02/23/2022    K 3.2 02/19/2022     02/23/2022    CO2 22 02/23/2022    BUN 22 02/23/2022    CREATININE 1.2 02/23/2022    CALCIUM 9.0 02/23/2022    PROT 6.2 02/22/2022    LABALBU 3.4 02/22/2022    BILITOT 0.9 02/22/2022    BILITOT 1.1 02/20/2022    BILITOT 0.6 02/19/2022    ALKPHOS 37 02/22/2022    ALKPHOS 40 02/20/2022    ALKPHOS 42 02/19/2022    AST 19 02/22/2022    AST 20 02/20/2022    AST 17 02/19/2022    ALT 8 02/22/2022    ALT 8 02/20/2022    ALT 6 02/19/2022     Lab Results   Component Value Date    LIPASE 57 02/19/2022     No results found for: AMYLASE      ASSESSMENT/PLAN:  # Acute blood loss anemia  - Underwent EGD 2/20/2022 with findings of small nonbleeding duodenal AVM, no source of anemia identified  - Underwent colonoscopy 2/21/2022 with findings of significant sigmoid diverticulosis, with old blood throughout the entire colon, and some rare flesh blood in the sigmoid colon/rectum; no actively bleeding lesions identified  - Source of bleeding most likely diverticular; but cannot rule out small bowel etiology; unable to intubate terminal ileum as we had to use a gastroscope due to adhesed, rigid sigmoid colon  - NM bleeding scan done yesterday 2/23/2022 with noted activity in expected location of proximal stomach  - No further episodes of hematochezia or melena  - Hgb has essentially remained stable  - Patient declines repeat endoscopy at this time  - Reasonable to continue to monitor off anticoagulation, repeat H&H in a few days; discussed with gustavo that if she is discharged, she should remain off anticoagulation and have repeat H&H, and immediately return to the hospital if she again exhibits any melena/hematochezia; she verbalized understanding and agreement with this    As patient is declining further endoscopic evaluation right now, without any overt bleeding and stable H&H, okay for general diet and discharge from GI POV, with close outpatient follow up - should remain off anticoagulation at least until repeat H&H in a few days      Will discuss with Dr. David Champagne DO  GI Fellow   2/24/2022  12:19 PM

## 2022-02-28 LAB
HCT VFR BLD CALC: ABNORMAL % (ref 34–48)
HEMOGLOBIN: ABNORMAL G/DL (ref 11.5–15.5)

## 2023-05-06 PROBLEM — R00.2 HEART PALPITATIONS: Status: RESOLVED | Noted: 2023-05-06 | Resolved: 2023-05-06

## 2023-05-06 PROBLEM — G31.84 MILD COGNITIVE IMPAIRMENT: Status: ACTIVE | Noted: 2023-05-06

## 2023-05-06 PROBLEM — D64.9 ANEMIA: Status: ACTIVE | Noted: 2023-05-06

## 2023-05-06 PROBLEM — K57.32 DIVERTICULITIS, COLON: Status: RESOLVED | Noted: 2023-05-06 | Resolved: 2023-05-06

## 2023-05-06 PROBLEM — E83.52 HYPERCALCEMIA: Status: ACTIVE | Noted: 2023-05-06

## 2023-05-06 PROBLEM — L03.90 CELLULITIS: Status: RESOLVED | Noted: 2023-05-06 | Resolved: 2023-05-06

## 2023-05-06 PROBLEM — R60.9 EDEMA: Status: RESOLVED | Noted: 2023-05-06 | Resolved: 2023-05-06

## 2023-05-06 PROBLEM — M10.9 GOUT: Status: ACTIVE | Noted: 2023-05-06

## 2023-05-06 PROBLEM — R31.9 HEMATURIA: Status: ACTIVE | Noted: 2023-05-06

## 2023-05-06 PROBLEM — R70.0 ELEVATED ERYTHROCYTE SEDIMENTATION RATE: Status: RESOLVED | Noted: 2023-05-06 | Resolved: 2023-05-06

## 2023-05-06 PROBLEM — R94.4 DECREASED GFR: Status: ACTIVE | Noted: 2023-05-06

## 2023-05-06 PROBLEM — R79.1 SUPRATHERAPEUTIC INR: Status: RESOLVED | Noted: 2023-05-06 | Resolved: 2023-05-06

## 2023-05-06 PROBLEM — I10 HYPERTENSION: Status: ACTIVE | Noted: 2023-05-06

## 2023-05-06 PROBLEM — L02.01 FACIAL ABSCESS: Status: RESOLVED | Noted: 2023-05-06 | Resolved: 2023-05-06

## 2023-05-06 PROBLEM — N17.9 AKI (ACUTE KIDNEY INJURY) (CMS-HCC): Status: ACTIVE | Noted: 2023-05-06

## 2023-05-06 PROBLEM — I48.91 ATRIAL FIBRILLATION (MULTI): Status: ACTIVE | Noted: 2023-05-06

## 2023-05-06 PROBLEM — E55.9 VITAMIN D DEFICIENCY: Status: ACTIVE | Noted: 2023-05-06

## 2023-05-06 PROBLEM — N39.0 UTI (URINARY TRACT INFECTION): Status: RESOLVED | Noted: 2023-05-06 | Resolved: 2023-05-06

## 2023-05-06 PROBLEM — K21.9 ACID REFLUX: Status: ACTIVE | Noted: 2023-05-06

## 2023-05-06 PROBLEM — H04.123 DRY EYES: Status: ACTIVE | Noted: 2023-05-06

## 2023-05-06 PROBLEM — E87.6 HYPOKALEMIA: Status: ACTIVE | Noted: 2023-05-06

## 2023-05-06 PROBLEM — R06.09 EXERTIONAL DYSPNEA: Status: RESOLVED | Noted: 2023-05-06 | Resolved: 2023-05-06

## 2023-05-06 PROBLEM — R05.9 COUGH: Status: RESOLVED | Noted: 2023-05-06 | Resolved: 2023-05-06

## 2023-12-26 ENCOUNTER — HOSPITAL ENCOUNTER (INPATIENT)
Facility: HOSPITAL | Age: 88
LOS: 2 days | Discharge: HOSPICE | DRG: 308 | End: 2023-12-29
Attending: EMERGENCY MEDICINE | Admitting: STUDENT IN AN ORGANIZED HEALTH CARE EDUCATION/TRAINING PROGRAM
Payer: MEDICARE

## 2023-12-26 ENCOUNTER — APPOINTMENT (OUTPATIENT)
Dept: CARDIOLOGY | Facility: HOSPITAL | Age: 88
DRG: 308 | End: 2023-12-26
Payer: MEDICARE

## 2023-12-26 ENCOUNTER — APPOINTMENT (OUTPATIENT)
Dept: RADIOLOGY | Facility: HOSPITAL | Age: 88
DRG: 308 | End: 2023-12-26
Payer: MEDICARE

## 2023-12-26 DIAGNOSIS — I48.20 ATRIAL FIBRILLATION, CHRONIC (MULTI): ICD-10-CM

## 2023-12-26 DIAGNOSIS — R07.9 CHEST PAIN, UNSPECIFIED TYPE: ICD-10-CM

## 2023-12-26 DIAGNOSIS — R00.1 BRADYCARDIA: Primary | ICD-10-CM

## 2023-12-26 LAB
ALBUMIN SERPL BCP-MCNC: 4.1 G/DL (ref 3.4–5)
ALP SERPL-CCNC: 34 U/L (ref 33–136)
ALT SERPL W P-5'-P-CCNC: 14 U/L (ref 7–45)
ANION GAP BLDV CALCULATED.4IONS-SCNC: 11 MMOL/L (ref 10–25)
ANION GAP SERPL CALC-SCNC: 16 MMOL/L (ref 10–20)
AST SERPL W P-5'-P-CCNC: 28 U/L (ref 9–39)
BASE EXCESS BLDV CALC-SCNC: 1.2 MMOL/L (ref -2–3)
BASOPHILS # BLD AUTO: 0.13 X10*3/UL (ref 0–0.1)
BASOPHILS NFR BLD AUTO: 1.4 %
BILIRUB SERPL-MCNC: 2 MG/DL (ref 0–1.2)
BODY TEMPERATURE: ABNORMAL
BUN SERPL-MCNC: 25 MG/DL (ref 6–23)
CA-I BLDV-SCNC: 1.29 MMOL/L (ref 1.1–1.33)
CALCIUM SERPL-MCNC: 10.3 MG/DL (ref 8.6–10.3)
CARDIAC TROPONIN I PNL SERPL HS: 42 NG/L (ref 0–13)
CHLORIDE BLDV-SCNC: 106 MMOL/L (ref 98–107)
CHLORIDE SERPL-SCNC: 105 MMOL/L (ref 98–107)
CO2 SERPL-SCNC: 24 MMOL/L (ref 21–32)
CREAT SERPL-MCNC: 1.23 MG/DL (ref 0.5–1.05)
EOSINOPHIL # BLD AUTO: 0.06 X10*3/UL (ref 0–0.4)
EOSINOPHIL NFR BLD AUTO: 0.6 %
ERYTHROCYTE [DISTWIDTH] IN BLOOD BY AUTOMATED COUNT: 16.5 % (ref 11.5–14.5)
FLUAV RNA RESP QL NAA+PROBE: NOT DETECTED
FLUBV RNA RESP QL NAA+PROBE: NOT DETECTED
GFR SERPL CREATININE-BSD FRML MDRD: 43 ML/MIN/1.73M*2
GLUCOSE BLDV-MCNC: 104 MG/DL (ref 74–99)
GLUCOSE SERPL-MCNC: 91 MG/DL (ref 74–99)
HCO3 BLDV-SCNC: 26 MMOL/L (ref 22–26)
HCT VFR BLD AUTO: 44.1 % (ref 36–46)
HCT VFR BLD EST: 39 % (ref 36–46)
HGB BLD-MCNC: 13.5 G/DL (ref 12–16)
HGB BLDV-MCNC: 12.9 G/DL (ref 12–16)
IMM GRANULOCYTES # BLD AUTO: 0.02 X10*3/UL (ref 0–0.5)
IMM GRANULOCYTES NFR BLD AUTO: 0.2 % (ref 0–0.9)
INHALED O2 CONCENTRATION: 21 %
LACTATE BLDV-SCNC: 2.6 MMOL/L (ref 0.4–2)
LYMPHOCYTES # BLD AUTO: 1.49 X10*3/UL (ref 0.8–3)
LYMPHOCYTES NFR BLD AUTO: 15.9 %
MAGNESIUM SERPL-MCNC: 1.86 MG/DL (ref 1.6–2.4)
MCH RBC QN AUTO: 28.4 PG (ref 26–34)
MCHC RBC AUTO-ENTMCNC: 30.6 G/DL (ref 32–36)
MCV RBC AUTO: 93 FL (ref 80–100)
MONOCYTES # BLD AUTO: 0.67 X10*3/UL (ref 0.05–0.8)
MONOCYTES NFR BLD AUTO: 7.1 %
NEUTROPHILS # BLD AUTO: 7.02 X10*3/UL (ref 1.6–5.5)
NEUTROPHILS NFR BLD AUTO: 74.8 %
NRBC BLD-RTO: 0 /100 WBCS (ref 0–0)
OXYHGB MFR BLDV: 60.5 % (ref 45–75)
PCO2 BLDV: 41 MM HG (ref 41–51)
PH BLDV: 7.41 PH (ref 7.33–7.43)
PLATELET # BLD AUTO: 224 X10*3/UL (ref 150–450)
PO2 BLDV: 38 MM HG (ref 35–45)
POTASSIUM BLDV-SCNC: 5.4 MMOL/L (ref 3.5–5.3)
POTASSIUM SERPL-SCNC: 5.2 MMOL/L (ref 3.5–5.3)
PROT SERPL-MCNC: 7.2 G/DL (ref 6.4–8.2)
RBC # BLD AUTO: 4.76 X10*6/UL (ref 4–5.2)
RSV RNA RESP QL NAA+PROBE: NOT DETECTED
SAO2 % BLDV: 62 % (ref 45–75)
SARS-COV-2 RNA RESP QL NAA+PROBE: NOT DETECTED
SODIUM BLDV-SCNC: 138 MMOL/L (ref 136–145)
SODIUM SERPL-SCNC: 140 MMOL/L (ref 136–145)
WBC # BLD AUTO: 9.4 X10*3/UL (ref 4.4–11.3)

## 2023-12-26 PROCEDURE — 84484 ASSAY OF TROPONIN QUANT: CPT | Performed by: EMERGENCY MEDICINE

## 2023-12-26 PROCEDURE — 85025 COMPLETE CBC W/AUTO DIFF WBC: CPT | Performed by: EMERGENCY MEDICINE

## 2023-12-26 PROCEDURE — 36415 COLL VENOUS BLD VENIPUNCTURE: CPT | Performed by: EMERGENCY MEDICINE

## 2023-12-26 PROCEDURE — 83880 ASSAY OF NATRIURETIC PEPTIDE: CPT

## 2023-12-26 PROCEDURE — 71045 X-RAY EXAM CHEST 1 VIEW: CPT | Performed by: STUDENT IN AN ORGANIZED HEALTH CARE EDUCATION/TRAINING PROGRAM

## 2023-12-26 PROCEDURE — 93005 ELECTROCARDIOGRAM TRACING: CPT

## 2023-12-26 PROCEDURE — 83735 ASSAY OF MAGNESIUM: CPT | Performed by: EMERGENCY MEDICINE

## 2023-12-26 PROCEDURE — 84132 ASSAY OF SERUM POTASSIUM: CPT | Performed by: EMERGENCY MEDICINE

## 2023-12-26 PROCEDURE — 96374 THER/PROPH/DIAG INJ IV PUSH: CPT

## 2023-12-26 PROCEDURE — 71045 X-RAY EXAM CHEST 1 VIEW: CPT

## 2023-12-26 PROCEDURE — 87637 SARSCOV2&INF A&B&RSV AMP PRB: CPT | Performed by: EMERGENCY MEDICINE

## 2023-12-26 PROCEDURE — 99291 CRITICAL CARE FIRST HOUR: CPT | Mod: 25 | Performed by: EMERGENCY MEDICINE

## 2023-12-26 PROCEDURE — 96372 THER/PROPH/DIAG INJ SC/IM: CPT

## 2023-12-26 RX ORDER — ATROPINE SULFATE 0.4 MG/ML
0.5 INJECTION, SOLUTION ENDOTRACHEAL; INTRAMEDULLARY; INTRAMUSCULAR; INTRAVENOUS; SUBCUTANEOUS ONCE
Status: DISCONTINUED | OUTPATIENT
Start: 2023-12-26 | End: 2023-12-28

## 2023-12-26 ASSESSMENT — COLUMBIA-SUICIDE SEVERITY RATING SCALE - C-SSRS
2. HAVE YOU ACTUALLY HAD ANY THOUGHTS OF KILLING YOURSELF?: NO
1. IN THE PAST MONTH, HAVE YOU WISHED YOU WERE DEAD OR WISHED YOU COULD GO TO SLEEP AND NOT WAKE UP?: NO
6. HAVE YOU EVER DONE ANYTHING, STARTED TO DO ANYTHING, OR PREPARED TO DO ANYTHING TO END YOUR LIFE?: NO

## 2023-12-26 ASSESSMENT — PAIN SCALES - GENERAL
PAINLEVEL_OUTOF10: 4
PAINLEVEL_OUTOF10: 5 - MODERATE PAIN

## 2023-12-26 ASSESSMENT — LIFESTYLE VARIABLES
HAVE PEOPLE ANNOYED YOU BY CRITICIZING YOUR DRINKING: NO
REASON UNABLE TO ASSESS: NO
EVER FELT BAD OR GUILTY ABOUT YOUR DRINKING: NO
HAVE YOU EVER FELT YOU SHOULD CUT DOWN ON YOUR DRINKING: NO
EVER HAD A DRINK FIRST THING IN THE MORNING TO STEADY YOUR NERVES TO GET RID OF A HANGOVER: NO

## 2023-12-26 ASSESSMENT — PAIN - FUNCTIONAL ASSESSMENT: PAIN_FUNCTIONAL_ASSESSMENT: 0-10

## 2023-12-26 ASSESSMENT — PAIN DESCRIPTION - LOCATION: LOCATION: CHEST

## 2023-12-27 ENCOUNTER — APPOINTMENT (OUTPATIENT)
Dept: CARDIOLOGY | Facility: HOSPITAL | Age: 88
DRG: 308 | End: 2023-12-27
Payer: MEDICARE

## 2023-12-27 PROBLEM — R07.9 CHEST PAIN: Status: ACTIVE | Noted: 2023-12-27

## 2023-12-27 PROBLEM — R00.1 BRADYCARDIA: Status: ACTIVE | Noted: 2023-12-27

## 2023-12-27 LAB
BNP SERPL-MCNC: 605 PG/ML (ref 0–99)
CARDIAC TROPONIN I PNL SERPL HS: 35 NG/L (ref 0–13)
MAGNESIUM SERPL-MCNC: 1.74 MG/DL (ref 1.6–2.4)
POTASSIUM SERPL-SCNC: 4.9 MMOL/L (ref 3.5–5.3)
TSH SERPL-ACNC: 1.46 MIU/L (ref 0.44–3.98)

## 2023-12-27 PROCEDURE — 93005 ELECTROCARDIOGRAM TRACING: CPT

## 2023-12-27 PROCEDURE — 93306 TTE W/DOPPLER COMPLETE: CPT | Performed by: INTERNAL MEDICINE

## 2023-12-27 PROCEDURE — 2500000004 HC RX 250 GENERAL PHARMACY W/ HCPCS (ALT 636 FOR OP/ED)

## 2023-12-27 PROCEDURE — 2500000004 HC RX 250 GENERAL PHARMACY W/ HCPCS (ALT 636 FOR OP/ED): Performed by: INTERNAL MEDICINE

## 2023-12-27 PROCEDURE — 83735 ASSAY OF MAGNESIUM: CPT

## 2023-12-27 PROCEDURE — 84443 ASSAY THYROID STIM HORMONE: CPT

## 2023-12-27 PROCEDURE — 84132 ASSAY OF SERUM POTASSIUM: CPT

## 2023-12-27 PROCEDURE — 93306 TTE W/DOPPLER COMPLETE: CPT

## 2023-12-27 PROCEDURE — 96372 THER/PROPH/DIAG INJ SC/IM: CPT | Performed by: INTERNAL MEDICINE

## 2023-12-27 PROCEDURE — 99223 1ST HOSP IP/OBS HIGH 75: CPT | Performed by: INTERNAL MEDICINE

## 2023-12-27 PROCEDURE — 2500000001 HC RX 250 WO HCPCS SELF ADMINISTERED DRUGS (ALT 637 FOR MEDICARE OP): Performed by: INTERNAL MEDICINE

## 2023-12-27 PROCEDURE — 1200000002 HC GENERAL ROOM WITH TELEMETRY DAILY

## 2023-12-27 PROCEDURE — 99223 1ST HOSP IP/OBS HIGH 75: CPT | Performed by: NURSE PRACTITIONER

## 2023-12-27 PROCEDURE — 2500000005 HC RX 250 GENERAL PHARMACY W/O HCPCS: Performed by: INTERNAL MEDICINE

## 2023-12-27 PROCEDURE — 36415 COLL VENOUS BLD VENIPUNCTURE: CPT

## 2023-12-27 RX ORDER — FUROSEMIDE 10 MG/ML
40 INJECTION INTRAMUSCULAR; INTRAVENOUS 2 TIMES DAILY
Status: DISCONTINUED | OUTPATIENT
Start: 2023-12-27 | End: 2023-12-29 | Stop reason: HOSPADM

## 2023-12-27 RX ORDER — NYSTATIN 100000 [USP'U]/G
1 POWDER TOPICAL 2 TIMES DAILY
Status: DISCONTINUED | OUTPATIENT
Start: 2023-12-27 | End: 2023-12-29 | Stop reason: HOSPADM

## 2023-12-27 RX ORDER — MAGNESIUM SULFATE HEPTAHYDRATE 40 MG/ML
2 INJECTION, SOLUTION INTRAVENOUS ONCE
Status: COMPLETED | OUTPATIENT
Start: 2023-12-27 | End: 2023-12-27

## 2023-12-27 RX ORDER — HYDROMORPHONE HYDROCHLORIDE 1 MG/ML
0.5 INJECTION, SOLUTION INTRAMUSCULAR; INTRAVENOUS; SUBCUTANEOUS EVERY 2 HOUR PRN
Status: DISCONTINUED | OUTPATIENT
Start: 2023-12-27 | End: 2023-12-29 | Stop reason: HOSPADM

## 2023-12-27 RX ORDER — ACETAMINOPHEN 160 MG/5ML
650 SOLUTION ORAL EVERY 4 HOURS PRN
Status: DISCONTINUED | OUTPATIENT
Start: 2023-12-27 | End: 2023-12-29 | Stop reason: HOSPADM

## 2023-12-27 RX ORDER — ACETAMINOPHEN 325 MG/1
650 TABLET ORAL EVERY 4 HOURS PRN
Status: DISCONTINUED | OUTPATIENT
Start: 2023-12-27 | End: 2023-12-29 | Stop reason: HOSPADM

## 2023-12-27 RX ORDER — ATROPINE SULFATE 1 MG/ML
1 INJECTION, SOLUTION INTRAVENOUS AS NEEDED
Status: DISCONTINUED | OUTPATIENT
Start: 2023-12-27 | End: 2023-12-27

## 2023-12-27 RX ORDER — NITROGLYCERIN 0.4 MG/1
0.4 TABLET SUBLINGUAL EVERY 5 MIN PRN
Status: DISCONTINUED | OUTPATIENT
Start: 2023-12-27 | End: 2023-12-29 | Stop reason: HOSPADM

## 2023-12-27 RX ORDER — MAGNESIUM SULFATE HEPTAHYDRATE 40 MG/ML
INJECTION, SOLUTION INTRAVENOUS
Status: COMPLETED
Start: 2023-12-27 | End: 2023-12-27

## 2023-12-27 RX ORDER — ATROPINE SULFATE 0.1 MG/ML
INJECTION INTRAVENOUS
Status: COMPLETED
Start: 2023-12-27 | End: 2023-12-27

## 2023-12-27 RX ORDER — ATORVASTATIN CALCIUM 80 MG/1
80 TABLET, FILM COATED ORAL DAILY
Status: DISCONTINUED | OUTPATIENT
Start: 2023-12-27 | End: 2023-12-29 | Stop reason: HOSPADM

## 2023-12-27 RX ORDER — ENOXAPARIN SODIUM 100 MG/ML
40 INJECTION SUBCUTANEOUS EVERY 12 HOURS SCHEDULED
Status: DISCONTINUED | OUTPATIENT
Start: 2023-12-27 | End: 2023-12-29 | Stop reason: HOSPADM

## 2023-12-27 RX ORDER — NAPROXEN SODIUM 220 MG/1
81 TABLET, FILM COATED ORAL DAILY
Status: DISCONTINUED | OUTPATIENT
Start: 2023-12-27 | End: 2023-12-29 | Stop reason: HOSPADM

## 2023-12-27 RX ORDER — ATROPINE SULFATE 0.1 MG/ML
1 INJECTION INTRAVENOUS AS NEEDED
Status: DISCONTINUED | OUTPATIENT
Start: 2023-12-27 | End: 2023-12-29 | Stop reason: HOSPADM

## 2023-12-27 RX ORDER — DOPAMINE HYDROCHLORIDE 160 MG/100ML
1-5 INJECTION, SOLUTION INTRAVENOUS CONTINUOUS
Status: DISCONTINUED | OUTPATIENT
Start: 2023-12-27 | End: 2023-12-29 | Stop reason: HOSPADM

## 2023-12-27 RX ORDER — ONDANSETRON HYDROCHLORIDE 2 MG/ML
4 INJECTION, SOLUTION INTRAVENOUS EVERY 6 HOURS PRN
Status: DISCONTINUED | OUTPATIENT
Start: 2023-12-27 | End: 2023-12-29 | Stop reason: HOSPADM

## 2023-12-27 RX ORDER — FUROSEMIDE 10 MG/ML
40 INJECTION INTRAMUSCULAR; INTRAVENOUS ONCE
Status: COMPLETED | OUTPATIENT
Start: 2023-12-27 | End: 2023-12-27

## 2023-12-27 RX ORDER — ACETAMINOPHEN 650 MG/1
650 SUPPOSITORY RECTAL EVERY 4 HOURS PRN
Status: DISCONTINUED | OUTPATIENT
Start: 2023-12-27 | End: 2023-12-29 | Stop reason: HOSPADM

## 2023-12-27 RX ADMIN — ENOXAPARIN SODIUM 40 MG: 40 INJECTION SUBCUTANEOUS at 20:58

## 2023-12-27 RX ADMIN — ATORVASTATIN CALCIUM 80 MG: 80 TABLET, FILM COATED ORAL at 08:40

## 2023-12-27 RX ADMIN — ACETAMINOPHEN 650 MG: 325 TABLET ORAL at 08:40

## 2023-12-27 RX ADMIN — DOPAMINE HYDROCHLORIDE 1 MCG/KG/MIN: 160 INJECTION, SOLUTION INTRAVENOUS at 04:21

## 2023-12-27 RX ADMIN — FUROSEMIDE 40 MG: 10 INJECTION, SOLUTION INTRAMUSCULAR; INTRAVENOUS at 17:29

## 2023-12-27 RX ADMIN — Medication 5 L/MIN: at 20:05

## 2023-12-27 RX ADMIN — NYSTATIN 1 APPLICATION: 100000 POWDER TOPICAL at 20:58

## 2023-12-27 RX ADMIN — ASPIRIN 81 MG CHEWABLE TABLET 81 MG: 81 TABLET CHEWABLE at 08:40

## 2023-12-27 RX ADMIN — ATROPINE SULFATE 1 MG: 0.1 INJECTION INTRAVENOUS at 10:30

## 2023-12-27 RX ADMIN — MAGNESIUM SULFATE HEPTAHYDRATE 2 G: 40 INJECTION, SOLUTION INTRAVENOUS at 13:17

## 2023-12-27 RX ADMIN — FUROSEMIDE 40 MG: 10 INJECTION, SOLUTION INTRAMUSCULAR; INTRAVENOUS at 11:28

## 2023-12-27 RX ADMIN — ENOXAPARIN SODIUM 40 MG: 40 INJECTION SUBCUTANEOUS at 08:41

## 2023-12-27 RX ADMIN — MAGNESIUM SULFATE HEPTAHYDRATE 2 G: 2 INJECTION, SOLUTION INTRAVENOUS at 13:17

## 2023-12-27 SDOH — SOCIAL STABILITY: SOCIAL INSECURITY: ARE THERE ANY APPARENT SIGNS OF INJURIES/BEHAVIORS THAT COULD BE RELATED TO ABUSE/NEGLECT?: NO

## 2023-12-27 SDOH — SOCIAL STABILITY: SOCIAL INSECURITY: HAS ANYONE EVER THREATENED TO HURT YOUR FAMILY OR YOUR PETS?: NO

## 2023-12-27 SDOH — SOCIAL STABILITY: SOCIAL INSECURITY: ABUSE: ADULT

## 2023-12-27 SDOH — SOCIAL STABILITY: SOCIAL INSECURITY: WERE YOU ABLE TO COMPLETE ALL THE BEHAVIORAL HEALTH SCREENINGS?: YES

## 2023-12-27 SDOH — SOCIAL STABILITY: SOCIAL INSECURITY: DO YOU FEEL ANYONE HAS EXPLOITED OR TAKEN ADVANTAGE OF YOU FINANCIALLY OR OF YOUR PERSONAL PROPERTY?: NO

## 2023-12-27 SDOH — SOCIAL STABILITY: SOCIAL INSECURITY: HAVE YOU HAD THOUGHTS OF HARMING ANYONE ELSE?: NO

## 2023-12-27 SDOH — SOCIAL STABILITY: SOCIAL INSECURITY: DO YOU FEEL UNSAFE GOING BACK TO THE PLACE WHERE YOU ARE LIVING?: NO

## 2023-12-27 SDOH — SOCIAL STABILITY: SOCIAL INSECURITY: DOES ANYONE TRY TO KEEP YOU FROM HAVING/CONTACTING OTHER FRIENDS OR DOING THINGS OUTSIDE YOUR HOME?: NO

## 2023-12-27 SDOH — SOCIAL STABILITY: SOCIAL INSECURITY: ARE YOU OR HAVE YOU BEEN THREATENED OR ABUSED PHYSICALLY, EMOTIONALLY, OR SEXUALLY BY ANYONE?: NO

## 2023-12-27 ASSESSMENT — ACTIVITIES OF DAILY LIVING (ADL)
BATHING: NEEDS ASSISTANCE
GROOMING: NEEDS ASSISTANCE
WALKS IN HOME: NEEDS ASSISTANCE
ASSISTIVE_DEVICE: EYEGLASSES;WALKER
HEARING - LEFT EAR: FUNCTIONAL
JUDGMENT_ADEQUATE_SAFELY_COMPLETE_DAILY_ACTIVITIES: YES
PATIENT'S MEMORY ADEQUATE TO SAFELY COMPLETE DAILY ACTIVITIES?: YES
LACK_OF_TRANSPORTATION: NO
HEARING - RIGHT EAR: FUNCTIONAL
FEEDING YOURSELF: NEEDS ASSISTANCE
TOILETING: NEEDS ASSISTANCE
ADEQUATE_TO_COMPLETE_ADL: YES
DRESSING YOURSELF: NEEDS ASSISTANCE

## 2023-12-27 ASSESSMENT — COGNITIVE AND FUNCTIONAL STATUS - GENERAL
PERSONAL GROOMING: A LITTLE
TURNING FROM BACK TO SIDE WHILE IN FLAT BAD: A LITTLE
CLIMB 3 TO 5 STEPS WITH RAILING: A LITTLE
TOILETING: A LITTLE
MOVING TO AND FROM BED TO CHAIR: A LITTLE
DRESSING REGULAR LOWER BODY CLOTHING: A LITTLE
TURNING FROM BACK TO SIDE WHILE IN FLAT BAD: A LITTLE
MOVING FROM LYING ON BACK TO SITTING ON SIDE OF FLAT BED WITH BEDRAILS: A LITTLE
STANDING UP FROM CHAIR USING ARMS: A LITTLE
MOVING TO AND FROM BED TO CHAIR: A LITTLE
STANDING UP FROM CHAIR USING ARMS: A LITTLE
MOBILITY SCORE: 18
DRESSING REGULAR UPPER BODY CLOTHING: A LITTLE
DAILY ACTIVITIY SCORE: 19
DAILY ACTIVITIY SCORE: 20
HELP NEEDED FOR BATHING: A LITTLE
MOBILITY SCORE: 18
DRESSING REGULAR UPPER BODY CLOTHING: A LITTLE
PATIENT BASELINE BEDBOUND: NO
PERSONAL GROOMING: A LITTLE
DRESSING REGULAR LOWER BODY CLOTHING: A LITTLE
MOVING FROM LYING ON BACK TO SITTING ON SIDE OF FLAT BED WITH BEDRAILS: A LITTLE
HELP NEEDED FOR BATHING: A LITTLE
CLIMB 3 TO 5 STEPS WITH RAILING: A LITTLE
WALKING IN HOSPITAL ROOM: A LITTLE
WALKING IN HOSPITAL ROOM: A LITTLE

## 2023-12-27 ASSESSMENT — PATIENT HEALTH QUESTIONNAIRE - PHQ9
SUM OF ALL RESPONSES TO PHQ9 QUESTIONS 1 & 2: 0
2. FEELING DOWN, DEPRESSED OR HOPELESS: NOT AT ALL
1. LITTLE INTEREST OR PLEASURE IN DOING THINGS: NOT AT ALL

## 2023-12-27 ASSESSMENT — ENCOUNTER SYMPTOMS
MUSCULOSKELETAL NEGATIVE: 1
SHORTNESS OF BREATH: 1
GASTROINTESTINAL NEGATIVE: 1
NEUROLOGICAL NEGATIVE: 1
PSYCHIATRIC NEGATIVE: 1
HEMATOLOGIC/LYMPHATIC NEGATIVE: 1
ACTIVITY CHANGE: 1
ALLERGIC/IMMUNOLOGIC NEGATIVE: 1
EYES NEGATIVE: 1
FATIGUE: 1
ENDOCRINE NEGATIVE: 1

## 2023-12-27 ASSESSMENT — LIFESTYLE VARIABLES
HOW OFTEN DO YOU HAVE A DRINK CONTAINING ALCOHOL: NEVER
HOW MANY STANDARD DRINKS CONTAINING ALCOHOL DO YOU HAVE ON A TYPICAL DAY: PATIENT DOES NOT DRINK
HOW OFTEN DO YOU HAVE 6 OR MORE DRINKS ON ONE OCCASION: NEVER
AUDIT-C TOTAL SCORE: 0
AUDIT-C TOTAL SCORE: 0
SKIP TO QUESTIONS 9-10: 1

## 2023-12-27 ASSESSMENT — HEART SCORE
TROPONIN: 1-3 TIMES NORMAL LIMIT
ECG: NON-SPECIFIC REPOLARIZATION DISTURBANCE
HEART SCORE: 5
AGE: 65+
HISTORY: SLIGHTLY SUSPICIOUS
RISK FACTORS: 1-2 RISK FACTORS

## 2023-12-27 ASSESSMENT — PAIN - FUNCTIONAL ASSESSMENT: PAIN_FUNCTIONAL_ASSESSMENT: 0-10

## 2023-12-27 ASSESSMENT — PAIN SCALES - GENERAL: PAINLEVEL_OUTOF10: 0 - NO PAIN

## 2023-12-27 NOTE — ED PROCEDURE NOTE
Procedure  Critical Care    Performed by: Dar Ferreira MD  Authorized by: Dar Ferreira MD    Critical care provider statement:     Critical care time (minutes):  45    Critical care time was exclusive of:  Separately billable procedures and treating other patients    Critical care was necessary to treat or prevent imminent or life-threatening deterioration of the following conditions:  Circulatory failure    Critical care was time spent personally by me on the following activities:  Ordering and performing treatments and interventions, ordering and review of laboratory studies, ordering and review of radiographic studies, pulse oximetry, re-evaluation of patient's condition, examination of patient, interpretation of cardiac output measurements, obtaining history from patient or surrogate and review of old charts    Care discussed with: admitting provider                 Dar Ferreira MD  12/26/23 7999

## 2023-12-27 NOTE — ED PROVIDER NOTES
HPI   Chief Complaint   Patient presents with    Chest Pain     Pt having chest pain all day. Pt's heart rate in the 30, atropine given in squad.       Katy is a 88-year-old woman somewhat limited historian who comes in with complaint of shortness of breath and chest discomfort started sometime tonight.  She was found by EMS to have a very slow irregular heart rate in the 40s.  She denies having this happen in the past.  She was given some atropine by EMS and that seemed to improve her symptoms.  She has multiple medical problems including cellulitis COVID-19 in the past exertional dyspnea heart palpitations.  She denies any fever chills.  No significant cough.  When asked about cardiac history she was not aware of any significant heart problems.                          Liz Coma Scale Score: 15   HEART Score: 5                Patient History   Past Medical History:   Diagnosis Date    Anemia     Atrial fibrillation (CMS/HCC)     Cellulitis 05/06/2023    Cough 05/06/2023    COVID-19 09/21/2022    COVID-19    Diverticulitis, colon 05/06/2023    Elevated erythrocyte sedimentation rate 05/06/2023    Exertional dyspnea 05/06/2023    Facial abscess 05/06/2023    GERD (gastroesophageal reflux disease)     Gout     Heart palpitations 05/06/2023    Hypertension     Supratherapeutic INR 05/06/2023    UTI (urinary tract infection) 05/06/2023    Vitamin D deficiency      Past Surgical History:   Procedure Laterality Date    OTHER SURGICAL HISTORY  06/06/2022    Tubal ligation    OTHER SURGICAL HISTORY  06/06/2022    Appendectomy     Family History   Problem Relation Name Age of Onset    Coronary artery disease Mother      Coronary artery disease Father      Coronary artery disease Brother      Diabetes Son       Social History     Tobacco Use    Smoking status: Former     Types: Cigarettes    Smokeless tobacco: Never   Substance Use Topics    Alcohol use: Not Currently    Drug use: Not Currently       Physical Exam   ED  Triage Vitals   Temp Pulse Resp BP   -- -- -- --      SpO2 Temp src Heart Rate Source Patient Position   -- -- -- --      BP Location FiO2 (%)     -- --       Physical Exam  Vitals reviewed.   Constitutional:       General: She is awake.      Appearance: Normal appearance.   HENT:      Head: Normocephalic.      Nose: Nose normal.   Cardiovascular:      Comments: Slow heart rate in the mid 40s no murmur appreciated  Pulmonary:      Effort: Pulmonary effort is normal.      Breath sounds: Normal breath sounds.   Abdominal:      Palpations: Abdomen is soft.   Musculoskeletal:      Cervical back: Normal range of motion.      Comments: Chronic lower extremity edema which is nonpitting   Skin:     General: Skin is warm.      Capillary Refill: Capillary refill takes less than 2 seconds.      Comments: Dry skin noted especially on the lower extremities  There is a fungal rash under her right breast slightly erythematous   Neurological:      Mental Status: She is alert.         ED Course & MDM   ED Course as of 12/27/23 0451   e Dec 26, 2023   2211 No acute distress.  Her heart rate is in the mid 40s she denies Chest pain or shortness of breath right now.  To be worked up in ED with chest x-ray and labs pacer pads placed.  Will try half an amp of atropine helped earlier. [RZ]   2259 Junctional rhythm at 45 bpm with right axis no obvious ischemia this is slightly different than August 31, 2023 rate was much faster.  No obvious ischemia [RZ]   Wed Dec 27, 2023   0130 I talked to the hospitalist who agrees with plan to hospitalize on telemetry.  I spoke to Dr. Kathleen per request of patient's daughter who normally sees Dr. Melchor.  Dr. Samaniego is covering.  See the patient in does not feel we need to put her any thinners or in the ICU. [RZ]      ED Course User Index  [RZ] Dar Ferreira MD         Diagnoses as of 12/27/23 0451   Bradycardia   Chest pain, unspecified type       Medical Decision  Making      Procedure  Procedures     Dar Ferreira MD  12/27/23 0131       Dar Ferreira MD  12/27/23 0452

## 2023-12-27 NOTE — CONSULTS
"Consults  History Of Present Illness:    Katy Galvin is an 88 y.o. female with history of atrial fibrillation (not on anticoagulation d/t GI bleed), and hypertension who was admitted for symptomatic bradycardia.     She states she has noticed an increase shortness of breath and having a \"funny\" and \"uncomfortable\" feeling in her chest with rest and activity. She states her palpations became worse last night and she started to become dizzy. She admits to current shortness of breath, palpations and becoming full quickly. Denies chest pain, dizziness or orthopnea. She states she does not take any medications at home.          Last Recorded Vitals:  Vitals:    12/27/23 0800 12/27/23 0900 12/27/23 1000 12/27/23 1100   BP: 132/75 104/80 146/70 (!) 168/48   Pulse: (!) 42 (!) 42 (!) 41 (!) 41   Resp: 22 14 (!) 27 14   Temp:   36.5 °C (97.7 °F)    TempSrc:       SpO2: 99% 96% 99% 96%   Weight:       Height:           Last Labs:  CBC - 12/26/2023: 10:22 PM  9.4 13.5 224    44.1      CMP - 12/26/2023: 10:22 PM  10.3 7.2 28 --- 2.0   _ 4.1 14 34      PTT - No results in last year.  _   _ _     Troponin I, High Sensitivity   Date/Time Value Ref Range Status   12/26/2023 11:33 PM 35 (H) 0 - 13 ng/L Final   12/26/2023 10:22 PM 42 (H) 0 - 13 ng/L Final     Troponin I   Date/Time Value Ref Range Status   08/31/2022 04:31 AM 27 (H) 0 - 13 ng/L Final     Comment:     .  Less than 99th percentile of normal range cutoff-  Female and children under 18 years old <14 ng/L; Male <21 ng/L: Negative  Repeat testing should be performed if clinically indicated.   .  Female and children under 18 years old 14-50 ng/L; Male 21-50 ng/L:  Consistent with possible cardiac damage and possible increased clinical   risk. Serial measurements may help to assess extent of myocardial damage.   .  >50 ng/L: Consistent with cardiac damage, increased clinical risk and  myocardial infarction. Serial measurements may help assess extent of   myocardial damage. "   .   NOTE: Children less than 1 year old may have higher baseline troponin   levels and results should be interpreted in conjunction with the overall   clinical context.   .  NOTE: Troponin I testing is performed using a different   testing methodology at [facility] than at other   system [facility]. Direct result comparisons should only   be made within the same method.     08/31/2022 03:06 AM 25 (H) 0 - 13 ng/L Final     Comment:     .  Less than 99th percentile of normal range cutoff-  Female and children under 18 years old <14 ng/L; Male <21 ng/L: Negative  Repeat testing should be performed if clinically indicated.   .  Female and children under 18 years old 14-50 ng/L; Male 21-50 ng/L:  Consistent with possible cardiac damage and possible increased clinical   risk. Serial measurements may help to assess extent of myocardial damage.   .  >50 ng/L: Consistent with cardiac damage, increased clinical risk and  myocardial infarction. Serial measurements may help assess extent of   myocardial damage.   .   NOTE: Children less than 1 year old may have higher baseline troponin   levels and results should be interpreted in conjunction with the overall   clinical context.   .  NOTE: Troponin I testing is performed using a different   testing methodology at [facility] than at other   [facility]. Direct result comparisons should only   be made within the same method.     07/08/2022 08:37 AM 27 (H) 0 - 13 ng/L Final     Comment:     .  Less than 99th percentile of normal range cutoff-  Female and children under 18 years old <14 ng/L; Male <21 ng/L: Negative  Repeat testing should be performed if clinically indicated.   .  Female and children under 18 years old 14-50 ng/L; Male 21-50 ng/L:  Consistent with possible cardiac damage and possible increased clinical   risk. Serial measurements may help to assess extent of myocardial damage.   .  >50 ng/L: Consistent with cardiac damage,  increased clinical risk and  myocardial infarction. Serial measurements may help assess extent of   myocardial damage.   .   NOTE: Children less than 1 year old may have higher baseline troponin   levels and results should be interpreted in conjunction with the overall   clinical context.   .  NOTE: Troponin I testing is performed using a different   testing methodology at [facility] than at other   system hospitals. Direct result comparisons should only   be made within the same method.       BNP   Date/Time Value Ref Range Status   12/26/2023 10:22  (H) 0 - 99 pg/mL Final   08/31/2022 03:06  (H) 0 - 99 pg/mL Final     Comment:     .  <100 pg/mL - Heart failure unlikely  100-299 pg/mL - Intermediate probability of acute heart  .               failure exacerbation. Correlate with clinical  .               context and patient history.    >=300 pg/mL - Heart Failure likely. Correlate with clinical  .               context and patient history.  BNP testing is performed using different testing   methodology at [facility] than at other   [facility] hospitals. Direct result comparisons should   only be made within the same method.     07/08/2022 07:25  (H) 0 - 99 pg/mL Final     Comment:     .  <100 pg/mL - Heart failure unlikely  100-299 pg/mL - Intermediate probability of acute heart  .               failure exacerbation. Correlate with clinical  .               context and patient history.    >=300 pg/mL - Heart Failure likely. Correlate with clinical  .               context and patient history.  BNP testing is performed using different testing   methodology at [facility] than at other   [facility] hospitals. Direct result comparisons should   only be made within the same method.          Past Medical History:  She has a past medical history of Anemia, Atrial fibrillation (CMS/[facility]), Cellulitis (05/06/2023), Cough (05/06/2023), COVID-19 (09/21/2022), Diverticulitis,  colon (05/06/2023), Elevated erythrocyte sedimentation rate (05/06/2023), Exertional dyspnea (05/06/2023), Facial abscess (05/06/2023), GERD (gastroesophageal reflux disease), Gout, Heart palpitations (05/06/2023), Hypertension, Supratherapeutic INR (05/06/2023), UTI (urinary tract infection) (05/06/2023), and Vitamin D deficiency.    Past Surgical History:  She has a past surgical history that includes Other surgical history (06/06/2022) and Other surgical history (06/06/2022).      Social History:  She reports that she has quit smoking. Her smoking use included cigarettes. She has never used smokeless tobacco. She reports that she does not currently use alcohol. She reports that she does not currently use drugs.    Family History:  Family History   Problem Relation Name Age of Onset    Coronary artery disease Mother      Coronary artery disease Father      Coronary artery disease Brother      Diabetes Son          Allergies:  Patient has no known allergies.    Inpatient Medications:  Scheduled medications   Medication Dose Route Frequency    aspirin  81 mg oral Daily    atorvastatin  80 mg oral Daily    atropine  0.5 mg intravenous Once    enoxaparin  40 mg subcutaneous q12h STACY    furosemide  40 mg intravenous BID    furosemide  40 mg intravenous Once     PRN medications   Medication    acetaminophen    Or    acetaminophen    Or    acetaminophen    atropine    HYDROmorphone    nitroglycerin    ondansetron    oxygen     Continuous Medications   Medication Dose Last Rate    [Held by provider] DOPamine  1-5 mcg/kg/min Stopped (12/27/23 1030)     Physical Exam  Vitals reviewed.   HENT:      Head: Normocephalic.      Nose: Nose normal.   Eyes:      Pupils: Pupils are equal, round, and reactive to light.   Cardiovascular:      Rate and Rhythm: irregular, bradycardic. +Murmur, +JVD, +1 BLE edema   Pulmonary:      Effort: Pulmonary effort is normal.      Breath sounds: diminished   Abdominal:      General: Abdomen is  distended      Palpations: Abdomen is soft.   Musculoskeletal:         General: Normal range of motion.      Cervical back: Normal range of motion.   Skin:     General: Skin is warm and dry.   Neurological:      General: No focal deficit present.      Mental Status: He is alert and oriented to person, place, and time.   Psychiatric:         Mood and Affect: Mood normal.         Behavior: Behavior normal.        Assessment/Plan   Katy Galvin is an 88 y.o. female with history of atrial fibrillation (not on anticoagulation d/t GI bleed), and hypertension who was admitted for symptomatic bradycardia.     EKG in the ED is Junctional, 45bpm. Atropine was given by EMS, and again in the ED (not states her symptoms improved but unsure if heart rate was changed). Trop 35, 42. Dopamine drip was started by the primary team. She is hypervolemic on exam, +JVD, BLE edema, abdominal fulness. EKG was obtained and she is in a junctional rhythm, 50bpm. On tele she is having a lot of bigeminy, heart rate 40-50, on the Dopamine drip running at 1mcg/kg/min.     We turned off her Dopamine drip and gave .5mg of IV Atropine with no improvement in heart rate. No decrease in heart rate with Dopamine off. Her daughter is at bedside and we discussed the need for a pacemaker, the patient is unsure if this is something she would want and would like to think about it. Echocardiogram done today showing a preserved EF, mitral and tricuspid regurgitation.     #Junctional Rhythm  #Acute on chronic decompensated heart failure    #Hypervolemia   #Elevated Troponin, not concerning for ACS    Plan:  Discontinue Dopamine drip  IV lasix   Strict I&Os  Keep K >4, Mg >2  Labs K+, TSH, BNP, Mg  Wean o2 as tolerated   Patient needs to make a dicision regarding whether she wants pacemaker or not         Maria Eugenia Clark, APRN-CNP

## 2023-12-27 NOTE — H&P
History Of Present Illness  Katy Galvin is an 88 y.o. female  with history of atrial fibrillation and hypertension presenting with SOB, dizziness and chest pain. Pt is somewhat of a limited historian due to dementia. She reports being in her USOH until a few days ago when she started becoming dyspneic on exertion. Daughter at bedside adds that pt has been complaining of feeling dizzy. She also mentioned having slight chest discomfort intermittently. She seemed worse tonight so family called EMS and she was brought to the ED. Her troponins were borderline elevated. EKG showed bradycardia and tele has repeatedly alarmed as her HR has dipped <40. She received a dose of Atropine in the ED.     Past Medical History  Past Medical History:   Diagnosis Date    Anemia     Atrial fibrillation (CMS/HCC)     Cellulitis 05/06/2023    Cough 05/06/2023    COVID-19 09/21/2022    COVID-19    Diverticulitis, colon 05/06/2023    Elevated erythrocyte sedimentation rate 05/06/2023    Exertional dyspnea 05/06/2023    Facial abscess 05/06/2023    GERD (gastroesophageal reflux disease)     Gout     Heart palpitations 05/06/2023    Hypertension     Supratherapeutic INR 05/06/2023    UTI (urinary tract infection) 05/06/2023    Vitamin D deficiency    Dementia    Past Surgical History  Past Surgical History:   Procedure Laterality Date    OTHER SURGICAL HISTORY  06/06/2022    Tubal ligation    OTHER SURGICAL HISTORY  06/06/2022    Appendectomy        Social History  She reports that she has quit smoking. Her smoking use included cigarettes. She has never used smokeless tobacco. She reports that she does not currently use alcohol. She reports that she does not currently use drugs.    Family History  Family History   Problem Relation Name Age of Onset    Coronary artery disease Mother      Coronary artery disease Father      Coronary artery disease Brother      Diabetes Son          Allergies  Patient has no known allergies.    Review of  Systems   Constitutional:  Positive for activity change and fatigue.   HENT: Negative.     Eyes: Negative.    Respiratory:  Positive for shortness of breath.    Cardiovascular:         See HPI   Gastrointestinal: Negative.    Endocrine: Negative.    Genitourinary: Negative.    Musculoskeletal: Negative.    Skin: Negative.    Allergic/Immunologic: Negative.    Neurological: Negative.    Hematological: Negative.    Psychiatric/Behavioral: Negative.          Physical Exam  Constitutional:       General: She is not in acute distress.     Appearance: She is obese. She is not ill-appearing, toxic-appearing or diaphoretic.   HENT:      Head: Normocephalic and atraumatic.      Nose: Nose normal.      Mouth/Throat:      Mouth: Mucous membranes are dry.      Pharynx: Oropharynx is clear. No oropharyngeal exudate or posterior oropharyngeal erythema.   Eyes:      General: No scleral icterus.        Right eye: No discharge.         Left eye: No discharge.      Conjunctiva/sclera: Conjunctivae normal.   Cardiovascular:      Heart sounds: No murmur heard.     Comments: S1 and S2 irregularly irregular and karen  Pulmonary:      Breath sounds: No wheezing, rhonchi or rales.   Abdominal:      General: There is distension.      Palpations: Abdomen is soft. There is no mass.      Tenderness: There is no abdominal tenderness. There is no right CVA tenderness, left CVA tenderness or guarding.   Musculoskeletal:      Cervical back: Neck supple.      Right lower leg: Edema present.      Left lower leg: Edema present.      Comments: 2+ BLE edema   Lymphadenopathy:      Cervical: No cervical adenopathy.   Skin:     Comments: Dry scaly skin in both LE   Neurological:      General: No focal deficit present.      Mental Status: She is alert and oriented to person, place, and time.   Psychiatric:         Mood and Affect: Mood normal.         Behavior: Behavior normal.          Last Recorded Vitals  Blood pressure 131/69, pulse 58, temperature  "37.4 °C (99.3 °F), temperature source Temporal, resp. rate 20, height 1.499 m (4' 11\"), weight 108 kg (238 lb 5.1 oz), SpO2 97 %.    Relevant Results   Latest Reference Range & Units 12/26/23 22:22 12/26/23 22:50 12/26/23 23:33   GLUCOSE 74 - 99 mg/dL 91     SODIUM 136 - 145 mmol/L 140     POTASSIUM 3.5 - 5.3 mmol/L 5.2     CHLORIDE 98 - 107 mmol/L 105     Bicarbonate 21 - 32 mmol/L 24     Anion Gap 10 - 20 mmol/L 16     Blood Urea Nitrogen 6 - 23 mg/dL 25 (H)     Creatinine 0.50 - 1.05 mg/dL 1.23 (H)     EGFR >60 mL/min/1.73m*2 43 (L)     Calcium 8.6 - 10.3 mg/dL 10.3     Albumin 3.4 - 5.0 g/dL 4.1     Alkaline Phosphatase 33 - 136 U/L 34     ALT 7 - 45 U/L 14     AST 9 - 39 U/L 28     Bilirubin Total 0.0 - 1.2 mg/dL 2.0 (H)     Total Protein 6.4 - 8.2 g/dL 7.2     MAGNESIUM 1.60 - 2.40 mg/dL 1.86     Troponin I, High Sensitivity 0 - 13 ng/L 42 (H)  35 (H)   WBC 4.4 - 11.3 x10*3/uL 9.4     nRBC 0.0 - 0.0 /100 WBCs 0.0     RBC 4.00 - 5.20 x10*6/uL 4.76     HEMOGLOBIN 12.0 - 16.0 g/dL 13.5     HEMATOCRIT 36.0 - 46.0 % 44.1     MCV 80 - 100 fL 93     MCH 26.0 - 34.0 pg 28.4     MCHC 32.0 - 36.0 g/dL 30.6 (L)     RED CELL DISTRIBUTION WIDTH 11.5 - 14.5 % 16.5 (H)     Platelets 150 - 450 x10*3/uL 224     Neutrophils % 40.0 - 80.0 % 74.8     Immature Granulocytes %, Automated 0.0 - 0.9 % 0.2     Lymphocytes % 13.0 - 44.0 % 15.9     Monocytes % 2.0 - 10.0 % 7.1     Eosinophils % 0.0 - 6.0 % 0.6     Basophils % 0.0 - 2.0 % 1.4     Neutrophils Absolute 1.60 - 5.50 x10*3/uL 7.02 (H)     Immature Granulocytes Absolute, Automated 0.00 - 0.50 x10*3/uL 0.02     Lymphocytes Absolute 0.80 - 3.00 x10*3/uL 1.49     Monocytes Absolute 0.05 - 0.80 x10*3/uL 0.67     Eosinophils Absolute 0.00 - 0.40 x10*3/uL 0.06     Basophils Absolute 0.00 - 0.10 x10*3/uL 0.13 (H)     Flu A Result Not Detected   Not Detected    Flu B Result Not Detected   Not Detected    RSV PCR Not Detected   Not Detected    Coronavirus 2019, PCR Not Detected   Not " Detected    POCT pH, Venous 7.33 - 7.43 pH 7.41     POCT pCO2, Venous 41 - 51 mm Hg 41     POCT pO2, Venous 35 - 45 mm Hg 38     POCT SO2, Venous 45 - 75 % 62     POCT Oxy Hemoglobin, Venous 45.0 - 75.0 % 60.5     POCT Hematocrit Calculated, Venous 36.0 - 46.0 % 39.0     POCT Sodium, Venous 136 - 145 mmol/L 138     POCT Potassium, Venous 3.5 - 5.3 mmol/L 5.4 (H)     POCT Chloride, Venous 98 - 107 mmol/L 106     POCT Ionized Calicum, Venous 1.10 - 1.33 mmol/L 1.29     POCT Glucose, Venous 74 - 99 mg/dL 104 (H)     POCT Lactate, Venous 0.4 - 2.0 mmol/L 2.6 (H)     POCT Base Excess, Venous -2.0 - 3.0 mmol/L 1.2     POCT HCO3 Calculated, Venous 22.0 - 26.0 mmol/L 26.0     POCT Hemoglobin, Venous 12.0 - 16.0 g/dL 12.9     POCT Anion Gap, Venous 10.0 - 25.0 mmol/L 11.0     FiO2 % 21     Patient Temperature  COMMENT ONLY     (H): Data is abnormally high  (L): Data is abnormally low    XR CHEST:    IMPRESSION:  1.  Cardiomegaly with bibasilar atelectasis and mild pulmonary  vascular congestion.     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      Rosita Martins MD

## 2023-12-27 NOTE — CARE PLAN
Palliative Care Social Work Note     Pt referred to [facility] via [facility].  See Palliative care NP note for details.     [facility] meeting 12/28 1630/1700.  Team updated.

## 2023-12-27 NOTE — CONSULTS
"PALLIATIVE MEDICINE CONSULT   Attending Physician: Helen Spears MD  Reason For Consult: Assistance with determining goals of care re symptomatic bradycardia    INTRODUCTIONS   Patient's capacity: Has waivering decision making capacity  Family present: Daughter Radha  Staff present: Juwan Keith NP and GENNA Rodriguez  Service: Palliative care was introduced as a resource for patients with serious illness to help with symptoms, assist with goals of care conversations, navigate complex decision making.  Support and empathy was provided throughout the encounter.    SUBJECTIVE   History of the Present Illness  Katy Galvin is a 88 y.o. female with pertinent PMH of AF ( no anticoagulant 2/2 GIB hx), HTN, CHF, dementia, morbid obesity. Hx obtained from daughter and record review due to pt being a  poor historian. Patient presented to the ED last night from home with daughter Betsy with a chief complaint of dizziness and shortness of breath that has been worsening over the past few days and has gotten worse. Last night, the symptoms got so bad and were accompanied by chest pain that pt asked her daughter to call 911. Testing in the ED revealed pt was bradycardic with symptomatic BP. Pt received a  dose of atropine and was admitted to the ICU for further evaluation and management. Dopamine was started and a pacemaker was recommended.  Pt has been indecisive about wanting the PPM done. Daughter cites that pt has no quality of life and does nothing and feels this is why the pt may not want to have a PPM placed to possibly lengthen her survival. The medical team is concerned that goals of care have not been determined and that pt is high risk of cardiac arrest without code status being confirmed.  Per pt and daughter, pt has a \"signed DNR.\"Palliative Medicine was consulted to assist determining GOC.     Review of Systems/Symptoms:    Pt does not want to answer more questions, so unable to fully get rest of the ROS " besides what is mentioned in the HPI.    Past Medical History   has a past medical history of Anemia, Atrial fibrillation (CMS/[facility]), Cellulitis (05/06/2023), Cough (05/06/2023), COVID-19 (09/21/2022), Diverticulitis, colon (05/06/2023), Elevated erythrocyte sedimentation rate (05/06/2023), Exertional dyspnea (05/06/2023), Facial abscess (05/06/2023), GERD (gastroesophageal reflux disease), Gout, Heart palpitations (05/06/2023), Hypertension, Supratherapeutic INR (05/06/2023), UTI (urinary tract infection) (05/06/2023), and Vitamin D deficiency.    Past Surgical History   has a past surgical history that includes Other surgical history (06/06/2022) and Other surgical history (06/06/2022).    Family Medical History  Family History   Problem Relation Name Age of Onset    Coronary artery disease Mother      Coronary artery disease Father      Coronary artery disease Brother      Diabetes Son         Home Medications  Reviewed    Allergies  No Known Allergies    Social History  The patient is  and has 4 living children. 2 have passed away. The patient lives with daughter Betsy. The main caregiver is daughter Betsy during the day, but no caregiver at night when Betsy is working. Jose Enrique Garcia at bedside denies that they are having problems caring for her, but now lynda pt would likely need 24/7 supervision/care, this may be more difficult for them to do. Former smoker. No  tobacco  or drug use. The patient spends most of the day Sedentary. Denies any safety concerns.     Mosque and Spirituality:  None noted    ADVANCED CARE PLANNING AND COUNSELING   Serious Illness Perception   Perspective:  daughter Radha; pt not wanting to answer more questions :    Impression of disease and stage: severe  Prognosis:poor  Concerns about the future: care at home if pt will need 24/7 care/supervision now  Hopes: comfort care at home with a cargiver or possibly the pt's other children may be able to help out a little  "more.  Attainability of hopes: unsure  Functional status:Moderate assistance  Health preferences and priorities at disease progression: Symptom control and to be at home with family  Rating of family knowledge about pt's disease, priorities and wishes:  Fair    Advanced Directive Documents  HPOA: No;  need to hear from majority of children to make major medical decisions if pt is unable  Radha Dia  [phone]  Syd Dia    Billy Dia  ( drives truck, so may not be available to answer phone the time)  Mukund Dia son  ( hard to reach 2/2 \"[place] in [place] in a [place].\")  Living will: No, however, pt would not want to be put on life sustaining machines for any amount of time    Emergency Contact Information  Extended Emergency Contact Information  Primary Emergency Contact: Joan Diayl  Home Phone: [phone]  Relation: Daughter  Secondary Emergency Contact: SYD DIA  Mobile Phone: [phone]  Relation: Daughter  Preferred language: English   needed? No    Resuscitation Assessment   Pt not interested in CPR or ventilator  Daughter Radha lim    Counseling Provided  Counseling provided on the current clinical condition, including diagnosis, prognosis, management strategies.  Additional counseling provided on weighing out bradycardia  and pt's other desire to get out of bed with no resuscitation decision at the time. Pt did eventually  demonstrate understanding in the moment of the high risk of hear heart stopping with getting out of bed.   Emphasized the importance of advanced directives and provided education on what the documents entail.   Provided education on palliative and hospice support services.  Counseling provided on resuscitation options with respect to QOL, disease state, and outcomes.  The patient/family verbalize understanding of this information.      OBJECTIVE   Inpatient Medications  aspirin, 81 mg, oral, Daily  atorvastatin, 80 " "mg, oral, Daily  atropine, 0.5 mg, intravenous, Once  enoxaparin, 40 mg, subcutaneous, q12h STACY  furosemide, 40 mg, intravenous, BID  furosemide, 40 mg, intravenous, Once  magnesium sulfate, 2 g, intravenous, Once      Continuous medications  [Held by provider] DOPamine, 1-5 mcg/kg/min, Last Rate: Stopped (12/27/23 1030)      PRN medications  PRN medications: acetaminophen **OR** acetaminophen **OR** acetaminophen, atropine, HYDROmorphone, nitroglycerin, ondansetron, oxygen       Last Recorded Vitals  Blood pressure (!) 127/94, pulse (!) 40, temperature 36.5 °C (97.7 °F), resp. rate 19, height 1.473 m (4' 10\"), weight 104 kg (230 lb 2.6 oz), SpO2 100 %.  7 Day Weight Change: Unable to Calculate   Body mass index is 48.1 kg/m².     Relevant Results  Lab Results   Component Value Date    WBC 9.4 12/26/2023    HGB 13.5 12/26/2023    HCT 44.1 12/26/2023    MCV 93 12/26/2023     12/26/2023      Lab Results   Component Value Date    GLUCOSE 91 12/26/2023    CALCIUM 10.3 12/26/2023     12/26/2023    K 4.9 12/27/2023    CO2 24 12/26/2023     12/26/2023    BUN 25 (H) 12/26/2023    CREATININE 1.23 (H) 12/26/2023      Lab Results   Component Value Date    ALT 14 12/26/2023    AST 28 12/26/2023    ALKPHOS 34 12/26/2023    BILITOT 2.0 (H) 12/26/2023      Lab Results   Component Value Date    ALBUMIN 4.1 12/26/2023      Serum creatinine: 1.23 mg/dL (H) 12/26/23 2222  Estimated creatinine clearance: 32.6 mL/min (A)     Relevant Imaging  ECG 12 lead    Result Date: 12/27/2023  Atrial fibrillation with slow ventricular response with premature ventricular or aberrantly conducted complexes Left axis deviation Abnormal ECG When compared with ECG of 26-DEC-2023 22:11, (unconfirmed) Atrial fibrillation has replaced Junctional rhythm QRS axis Shifted left    ECG 12 lead    Result Date: 12/27/2023  Junctional rhythm Right axis deviation Possible Anterior infarct , age undetermined Abnormal ECG When compared with ECG of " 31-AUG-2022 03:53, Previous ECG has undetermined rhythm, needs review QRS axis Shifted right Nonspecific T wave abnormality no longer evident in Lateral leads    XR chest 1 view    Result Date: 12/26/2023  Interpreted By:  Lauren Geiger, STUDY: XR CHEST 1 VIEW;  12/26/2023 10:27 pm   INDICATION: Signs/Symptoms:Chest Pain.   COMPARISON: Radiographs of the chest dated 07/08/2022.   ACCESSION NUMBER(S): [id number]   ORDERING CLINICIAN: SADIE WEBB   FINDINGS: AP radiograph of the chest was provided.   Patient is somewhat rotated to the left.   CARDIOMEDIASTINAL SILHOUETTE: Cardiomediastinal silhouette is enlarged, slightly increased from prior exam in July of 2022, possibly projectional.   LUNGS: Bibasilar atelectasis with pulmonary vascular congestion is present, without evidence of sizable pleural effusion or consolidation. No pneumothorax.   ABDOMEN: No remarkable upper abdominal findings.   BONES: No acute osseous changes.       1.  Cardiomegaly with bibasilar atelectasis and mild pulmonary vascular congestion.       MACRO: None   Signed by: Lauren Geiger 12/26/2023 10:58 PM Dictation workstation:   [id number]       Physical Exam  General:  obese, elderly woman siting up in bed in no apparent distress, well developed and well nourished, and alert  Skin:  dry   HEENT: MMD  Respiratory: unlabored respirations, no intercostal retractions or accessory muscle use on NC  Cardiovascular:  HR karen in the 30's and 40's RRR  Abdomen: obese abdomen  Urologic: Deferred  Musculoskeletal:  Generalized weakness   Neurologic:  Speech is Clear. Follows simple commands.   Mental status:   has some insight, but not full insight into her situation with new heart issue, generally calm  Extracorporeal:  No mechanical supports     ASSESSMENT AND PLAN   Impression  This is a seriously ill 88 year old with PMH of CHF, AF, HTN, moderate Dementia  who is hospitalized for symptomatic bradycardia. This has been  complicated by pt indecision re a pacemaker and  DC planning considerations.     Goals of Care: Undetermined; family and pt will be discussing survival v comfort and also PPM final decision  Code Status: Updated to DNR and DNI, no pacing  Prognosis: poor  Hospice Eligibility: Yes, without pacemaker: symptomatic bradycardia    Plan:  Continue basic supportive care within limitaitons  HWR consulted     DC considerations: Family considering taking pt home w hospice, but pt may need 24/7 supervision, and family may or may not be able to manage that    We will continue to follow    Thank you for asking Palliative Care to assist with care of this patient.  Please contact us for additional questions or concerns.    Update provided to:     MEDICAL COMPLEXITY    Medical complexity was high level due to due to complexity of problems, extensive data review, and high risk of death without treatment.     CONTACT INFORMATION   Shauna Echeverria CNP  Palliative Medicine  [place]

## 2023-12-27 NOTE — PROGRESS NOTES
"Katy Galvin is a 88 y.o. female on day 0 of admission presenting with Chest pain.    Subjective   Pt is upset she is not allow to get up.        Objective     Physical Exam  Vitals and nursing note reviewed.   Constitutional:       General: She is not in acute distress.     Appearance: Normal appearance. She is not ill-appearing or toxic-appearing.   HENT:      Head: Normocephalic and atraumatic.      Mouth/Throat:      Mouth: Mucous membranes are moist.   Eyes:      Extraocular Movements: Extraocular movements intact.      Conjunctiva/sclera: Conjunctivae normal.      Pupils: Pupils are equal, round, and reactive to light.   Cardiovascular:      Rate and Rhythm: Regular rhythm. Bradycardia present.      Heart sounds: No murmur heard.     No gallop.   Pulmonary:      Effort: Pulmonary effort is normal. No respiratory distress.      Breath sounds: Normal breath sounds. No wheezing, rhonchi or rales.   Abdominal:      General: Abdomen is flat. Bowel sounds are normal. There is no distension.      Palpations: Abdomen is soft. There is no mass.      Tenderness: There is no abdominal tenderness.   Musculoskeletal:         General: No swelling or tenderness. Normal range of motion.      Cervical back: Normal range of motion and neck supple.   Skin:     General: Skin is warm and dry.   Neurological:      Mental Status: She is alert and oriented to person, place, and time.      Motor: Weakness present.   Psychiatric:         Mood and Affect: Mood normal.         Behavior: Behavior normal.         Thought Content: Thought content normal.         Judgment: Judgment normal.         Last Recorded Vitals:  BP (!) 127/94   Pulse (!) 40   Temp 36.5 °C (97.7 °F)   Resp 19   Ht 1.473 m (4' 10\")   Wt 104 kg (230 lb 2.6 oz)   SpO2 100%   BMI 48.10 kg/m²      Scheduled medications:  aspirin, 81 mg, oral, Daily  atorvastatin, 80 mg, oral, Daily  atropine, 0.5 mg, intravenous, Once  enoxaparin, 40 mg, subcutaneous, q12h " STACY  furosemide, 40 mg, intravenous, BID  furosemide, 40 mg, intravenous, Once  magnesium sulfate, 2 g, intravenous, Once      Continuous medications:  [Held by provider] DOPamine, 1-5 mcg/kg/min, Last Rate: Stopped (12/27/23 1030)      PRN medications:  PRN medications: acetaminophen **OR** acetaminophen **OR** acetaminophen, atropine, HYDROmorphone, nitroglycerin, ondansetron, oxygen     Relevant Results:  Results for orders placed or performed during the hospital encounter of 12/26/23 (from the past 24 hour(s))   CBC and Auto Differential   Result Value Ref Range    WBC 9.4 4.4 - 11.3 x10*3/uL    nRBC 0.0 0.0 - 0.0 /100 WBCs    RBC 4.76 4.00 - 5.20 x10*6/uL    Hemoglobin 13.5 12.0 - 16.0 g/dL    Hematocrit 44.1 36.0 - 46.0 %    MCV 93 80 - 100 fL    MCH 28.4 26.0 - 34.0 pg    MCHC 30.6 (L) 32.0 - 36.0 g/dL    RDW 16.5 (H) 11.5 - 14.5 %    Platelets 224 150 - 450 x10*3/uL    Neutrophils % 74.8 40.0 - 80.0 %    Immature Granulocytes %, Automated 0.2 0.0 - 0.9 %    Lymphocytes % 15.9 13.0 - 44.0 %    Monocytes % 7.1 2.0 - 10.0 %    Eosinophils % 0.6 0.0 - 6.0 %    Basophils % 1.4 0.0 - 2.0 %    Neutrophils Absolute 7.02 (H) 1.60 - 5.50 x10*3/uL    Immature Granulocytes Absolute, Automated 0.02 0.00 - 0.50 x10*3/uL    Lymphocytes Absolute 1.49 0.80 - 3.00 x10*3/uL    Monocytes Absolute 0.67 0.05 - 0.80 x10*3/uL    Eosinophils Absolute 0.06 0.00 - 0.40 x10*3/uL    Basophils Absolute 0.13 (H) 0.00 - 0.10 x10*3/uL   Comprehensive Metabolic Panel   Result Value Ref Range    Glucose 91 74 - 99 mg/dL    Sodium 140 136 - 145 mmol/L    Potassium 5.2 3.5 - 5.3 mmol/L    Chloride 105 98 - 107 mmol/L    Bicarbonate 24 21 - 32 mmol/L    Anion Gap 16 10 - 20 mmol/L    Urea Nitrogen 25 (H) 6 - 23 mg/dL    Creatinine 1.23 (H) 0.50 - 1.05 mg/dL    eGFR 43 (L) >60 mL/min/1.73m*2    Calcium 10.3 8.6 - 10.3 mg/dL    Albumin 4.1 3.4 - 5.0 g/dL    Alkaline Phosphatase 34 33 - 136 U/L    Total Protein 7.2 6.4 - 8.2 g/dL    AST 28 9 - 39  U/L    Bilirubin, Total 2.0 (H) 0.0 - 1.2 mg/dL    ALT 14 7 - 45 U/L   Magnesium   Result Value Ref Range    Magnesium 1.86 1.60 - 2.40 mg/dL   BLOOD GAS VENOUS FULL PANEL   Result Value Ref Range    POCT pH, Venous 7.41 7.33 - 7.43 pH    POCT pCO2, Venous 41 41 - 51 mm Hg    POCT pO2, Venous 38 35 - 45 mm Hg    POCT SO2, Venous 62 45 - 75 %    POCT Oxy Hemoglobin, Venous 60.5 45.0 - 75.0 %    POCT Hematocrit Calculated, Venous 39.0 36.0 - 46.0 %    POCT Sodium, Venous 138 136 - 145 mmol/L    POCT Potassium, Venous 5.4 (H) 3.5 - 5.3 mmol/L    POCT Chloride, Venous 106 98 - 107 mmol/L    POCT Ionized Calicum, Venous 1.29 1.10 - 1.33 mmol/L    POCT Glucose, Venous 104 (H) 74 - 99 mg/dL    POCT Lactate, Venous 2.6 (H) 0.4 - 2.0 mmol/L    POCT Base Excess, Venous 1.2 -2.0 - 3.0 mmol/L    POCT HCO3 Calculated, Venous 26.0 22.0 - 26.0 mmol/L    POCT Hemoglobin, Venous 12.9 12.0 - 16.0 g/dL    POCT Anion Gap, Venous 11.0 10.0 - 25.0 mmol/L    Patient Temperature      FiO2 21 %   Troponin I, High Sensitivity, Initial   Result Value Ref Range    Troponin I, High Sensitivity 42 (H) 0 - 13 ng/L   B-type Natriuretic Peptide   Result Value Ref Range     (H) 0 - 99 pg/mL   ECG 12 lead   Result Value Ref Range    Ventricular Rate 45 BPM    Atrial Rate 48 BPM    QRS Duration 78 ms    QT Interval 434 ms    QTC Calculation(Bazett) 375 ms    R Axis 115 degrees    T Axis 50 degrees    QRS Count 7 beats    Q Onset 224 ms    T Offset 441 ms    QTC Fredericia 394 ms   Sars-CoV-2 and Influenza A/B PCR   Result Value Ref Range    Flu A Result Not Detected Not Detected    Flu B Result Not Detected Not Detected    Coronavirus 2019, PCR Not Detected Not Detected   RSV PCR   Result Value Ref Range    RSV PCR Not Detected Not Detected   Troponin, High Sensitivity, 1 Hour   Result Value Ref Range    Troponin I, High Sensitivity 35 (H) 0 - 13 ng/L   ECG 12 lead   Result Value Ref Range    Ventricular Rate 54 BPM    Atrial Rate 227 BPM     QRS Duration 86 ms    QT Interval 442 ms    QTC Calculation(Bazett) 419 ms    R Axis -39 degrees    T Axis 11 degrees    QRS Count 8 beats    Q Onset 225 ms    T Offset 446 ms    QTC Fredericia 426 ms   Transthoracic Echo (TTE) Complete   Result Value Ref Range    BSA 2.07 m2   Potassium   Result Value Ref Range    Potassium 4.9 3.5 - 5.3 mmol/L   Magnesium   Result Value Ref Range    Magnesium 1.74 1.60 - 2.40 mg/dL   TSH with reflex to Free T4 if abnormal   Result Value Ref Range    Thyroid Stimulating Hormone 1.46 0.44 - 3.98 mIU/L       ECG 12 lead    Result Date: 12/27/2023  Atrial fibrillation with slow ventricular response with premature ventricular or aberrantly conducted complexes Left axis deviation Abnormal ECG When compared with ECG of 26-DEC-2023 22:11, (unconfirmed) Atrial fibrillation has replaced Junctional rhythm QRS axis Shifted left    ECG 12 lead    Result Date: 12/27/2023  Junctional rhythm Right axis deviation Possible Anterior infarct , age undetermined Abnormal ECG When compared with ECG of 31-AUG-2022 03:53, Previous ECG has undetermined rhythm, needs review QRS axis Shifted right Nonspecific T wave abnormality no longer evident in Lateral leads    XR chest 1 view    Result Date: 12/26/2023  Interpreted By:  Lauren Geiger, STUDY: XR CHEST 1 VIEW;  12/26/2023 10:27 pm   INDICATION: Signs/Symptoms:Chest Pain.   COMPARISON: Radiographs of the chest dated 07/08/2022.   ACCESSION NUMBER(S): [id number]   ORDERING CLINICIAN: SADIE WEBB   FINDINGS: AP radiograph of the chest was provided.   Patient is somewhat rotated to the left.   CARDIOMEDIASTINAL SILHOUETTE: Cardiomediastinal silhouette is enlarged, slightly increased from prior exam in July of 2022, possibly projectional.   LUNGS: Bibasilar atelectasis with pulmonary vascular congestion is present, without evidence of sizable pleural effusion or consolidation. No pneumothorax.   ABDOMEN: No remarkable upper abdominal findings.    BONES: No acute osseous changes.       1.  Cardiomegaly with bibasilar atelectasis and mild pulmonary vascular congestion.       MACRO: None   Signed by: Lauren Geiger 12/26/2023 10:58 PM Dictation workstation:   [id number]            Assessment/Plan   Principal Problem:    Chest pain  Active Problems:    Bradycardia    HFrEF exacerbation  - Cardio following  - IV lasix  - statin/ASA    Bradycardia  - Cardio following: pt needs pacer  - pt does not know if she wants pacer, will consult palliative    DVT ppx: SCD  Dispo: monitor clinically, continue diuresis         Helen Spears MD  Hospitalist

## 2023-12-28 LAB
ALBUMIN SERPL BCP-MCNC: 3.6 G/DL (ref 3.4–5)
ALP SERPL-CCNC: 31 U/L (ref 33–136)
ALT SERPL W P-5'-P-CCNC: 13 U/L (ref 7–45)
ANION GAP SERPL CALC-SCNC: 18 MMOL/L (ref 10–20)
AST SERPL W P-5'-P-CCNC: 22 U/L (ref 9–39)
BASOPHILS # BLD AUTO: 0.11 X10*3/UL (ref 0–0.1)
BASOPHILS NFR BLD AUTO: 1.1 %
BILIRUB SERPL-MCNC: 2.4 MG/DL (ref 0–1.2)
BUN SERPL-MCNC: 32 MG/DL (ref 6–23)
CALCIUM SERPL-MCNC: 10 MG/DL (ref 8.6–10.3)
CHLORIDE SERPL-SCNC: 105 MMOL/L (ref 98–107)
CO2 SERPL-SCNC: 21 MMOL/L (ref 21–32)
CREAT SERPL-MCNC: 1.51 MG/DL (ref 0.5–1.05)
EOSINOPHIL # BLD AUTO: 0.06 X10*3/UL (ref 0–0.4)
EOSINOPHIL NFR BLD AUTO: 0.6 %
ERYTHROCYTE [DISTWIDTH] IN BLOOD BY AUTOMATED COUNT: 16.6 % (ref 11.5–14.5)
GFR SERPL CREATININE-BSD FRML MDRD: 33 ML/MIN/1.73M*2
GLUCOSE BLD MANUAL STRIP-MCNC: 111 MG/DL (ref 74–99)
GLUCOSE SERPL-MCNC: 111 MG/DL (ref 74–99)
HCT VFR BLD AUTO: 40 % (ref 36–46)
HGB BLD-MCNC: 12 G/DL (ref 12–16)
IMM GRANULOCYTES # BLD AUTO: 0.04 X10*3/UL (ref 0–0.5)
IMM GRANULOCYTES NFR BLD AUTO: 0.4 % (ref 0–0.9)
LEFT ATRIUM VOLUME AREA LENGTH INDEX BSA: 79.6
LEFT VENTRICLE INTERNAL DIMENSION DIASTOLE: 4.84 (ref 3.5–6)
LEFT VENTRICULAR OUTFLOW TRACT DIAMETER: 1.8
LYMPHOCYTES # BLD AUTO: 1.32 X10*3/UL (ref 0.8–3)
LYMPHOCYTES NFR BLD AUTO: 13.4 %
MCH RBC QN AUTO: 27.9 PG (ref 26–34)
MCHC RBC AUTO-ENTMCNC: 30 G/DL (ref 32–36)
MCV RBC AUTO: 93 FL (ref 80–100)
MITRAL VALVE E/A RATIO: 1.69
MONOCYTES # BLD AUTO: 0.67 X10*3/UL (ref 0.05–0.8)
MONOCYTES NFR BLD AUTO: 6.8 %
NEUTROPHILS # BLD AUTO: 7.63 X10*3/UL (ref 1.6–5.5)
NEUTROPHILS NFR BLD AUTO: 77.7 %
NRBC BLD-RTO: 0 /100 WBCS (ref 0–0)
PLATELET # BLD AUTO: 208 X10*3/UL (ref 150–450)
POTASSIUM SERPL-SCNC: 4.5 MMOL/L (ref 3.5–5.3)
PROT SERPL-MCNC: 6.3 G/DL (ref 6.4–8.2)
RBC # BLD AUTO: 4.3 X10*6/UL (ref 4–5.2)
RIGHT VENTRICLE PEAK SYSTOLIC PRESSURE: 62.6
SODIUM SERPL-SCNC: 139 MMOL/L (ref 136–145)
WBC # BLD AUTO: 9.8 X10*3/UL (ref 4.4–11.3)

## 2023-12-28 PROCEDURE — 80053 COMPREHEN METABOLIC PANEL: CPT | Performed by: STUDENT IN AN ORGANIZED HEALTH CARE EDUCATION/TRAINING PROGRAM

## 2023-12-28 PROCEDURE — 2500000004 HC RX 250 GENERAL PHARMACY W/ HCPCS (ALT 636 FOR OP/ED)

## 2023-12-28 PROCEDURE — 96372 THER/PROPH/DIAG INJ SC/IM: CPT | Performed by: INTERNAL MEDICINE

## 2023-12-28 PROCEDURE — 99232 SBSQ HOSP IP/OBS MODERATE 35: CPT | Performed by: INTERNAL MEDICINE

## 2023-12-28 PROCEDURE — 85025 COMPLETE CBC W/AUTO DIFF WBC: CPT | Performed by: STUDENT IN AN ORGANIZED HEALTH CARE EDUCATION/TRAINING PROGRAM

## 2023-12-28 PROCEDURE — 36415 COLL VENOUS BLD VENIPUNCTURE: CPT | Performed by: STUDENT IN AN ORGANIZED HEALTH CARE EDUCATION/TRAINING PROGRAM

## 2023-12-28 PROCEDURE — 82947 ASSAY GLUCOSE BLOOD QUANT: CPT

## 2023-12-28 PROCEDURE — 1200000002 HC GENERAL ROOM WITH TELEMETRY DAILY

## 2023-12-28 PROCEDURE — 99232 SBSQ HOSP IP/OBS MODERATE 35: CPT | Performed by: PHYSICIAN ASSISTANT

## 2023-12-28 PROCEDURE — 99232 SBSQ HOSP IP/OBS MODERATE 35: CPT | Performed by: STUDENT IN AN ORGANIZED HEALTH CARE EDUCATION/TRAINING PROGRAM

## 2023-12-28 PROCEDURE — 2500000004 HC RX 250 GENERAL PHARMACY W/ HCPCS (ALT 636 FOR OP/ED): Performed by: INTERNAL MEDICINE

## 2023-12-28 PROCEDURE — 2500000001 HC RX 250 WO HCPCS SELF ADMINISTERED DRUGS (ALT 637 FOR MEDICARE OP): Performed by: INTERNAL MEDICINE

## 2023-12-28 RX ADMIN — ASPIRIN 81 MG CHEWABLE TABLET 81 MG: 81 TABLET CHEWABLE at 08:33

## 2023-12-28 RX ADMIN — FUROSEMIDE 40 MG: 10 INJECTION, SOLUTION INTRAMUSCULAR; INTRAVENOUS at 20:41

## 2023-12-28 RX ADMIN — FUROSEMIDE 40 MG: 10 INJECTION, SOLUTION INTRAMUSCULAR; INTRAVENOUS at 08:33

## 2023-12-28 RX ADMIN — ENOXAPARIN SODIUM 40 MG: 40 INJECTION SUBCUTANEOUS at 20:42

## 2023-12-28 RX ADMIN — ATORVASTATIN CALCIUM 80 MG: 80 TABLET, FILM COATED ORAL at 08:33

## 2023-12-28 RX ADMIN — NYSTATIN 1 APPLICATION: 100000 POWDER TOPICAL at 20:44

## 2023-12-28 ASSESSMENT — COGNITIVE AND FUNCTIONAL STATUS - GENERAL
TURNING FROM BACK TO SIDE WHILE IN FLAT BAD: A LITTLE
STANDING UP FROM CHAIR USING ARMS: A LITTLE
CLIMB 3 TO 5 STEPS WITH RAILING: A LITTLE
WALKING IN HOSPITAL ROOM: A LITTLE
MOBILITY SCORE: 18
MOVING FROM LYING ON BACK TO SITTING ON SIDE OF FLAT BED WITH BEDRAILS: A LITTLE
MOVING TO AND FROM BED TO CHAIR: A LITTLE

## 2023-12-28 ASSESSMENT — PAIN SCALES - GENERAL
PAINLEVEL_OUTOF10: 0 - NO PAIN
PAINLEVEL_OUTOF10: 0 - NO PAIN

## 2023-12-28 ASSESSMENT — PAIN - FUNCTIONAL ASSESSMENT: PAIN_FUNCTIONAL_ASSESSMENT: 0-10

## 2023-12-28 NOTE — PROGRESS NOTES
Subjective Data:  Patient endorses fatigue, shortness of breath, occasional episodes of dizziness. Was unable to further qualify/quantify her symptoms. Was getting up out of bed with assistance at beginning of exam with difficulty and profound shortness of breath. ROS limited given pt cooperation.     Overnight Events:    Ongoing episodes of bradycardia with prolonged R-R interval.      Objective Data:  Last Recorded Vitals:  Vitals:    12/28/23 1000 12/28/23 1100 12/28/23 1200 12/28/23 1304   BP: (!) 119/99 (!) 128/113 (!) 113/47    BP Location:       Pulse: (!) 40 (!) 39 (!) 36    Resp: 21 23 19    Temp:       TempSrc:       SpO2: 97%  95%    Weight:    105 kg (231 lb 14.8 oz)   Height:           Last Labs:  CBC - 12/28/2023:  5:09 AM  9.8 12.0 208    40.0      CMP - 12/28/2023:  5:09 AM  10.0 6.3 22 --- 2.4   _ 3.6 13 31      PTT - No results in last year.  _   _ _     TROPHS   Date/Time Value Ref Range Status   12/26/2023 11:33 PM 35 0 - 13 ng/L Final   12/26/2023 10:22 PM 42 0 - 13 ng/L Final   08/31/2022 04:31 AM 27 0 - 13 ng/L Final     Comment:     .  Less than 99th percentile of normal range cutoff-  Female and children under 18 years old <14 ng/L; Male <21 ng/L: Negative  Repeat testing should be performed if clinically indicated.   .  Female and children under 18 years old 14-50 ng/L; Male 21-50 ng/L:  Consistent with possible cardiac damage and possible increased clinical   risk. Serial measurements may help to assess extent of myocardial damage.   .  >50 ng/L: Consistent with cardiac damage, increased clinical risk and  myocardial infarction. Serial measurements may help assess extent of   myocardial damage.   .   NOTE: Children less than 1 year old may have higher baseline troponin   levels and results should be interpreted in conjunction with the overall   clinical context.   .  NOTE: Troponin I testing is performed using a different   testing methodology at [facility] than at other    [facility]. Direct result comparisons should only   be made within the same method.     08/31/2022 03:06 AM 25 0 - 13 ng/L Final     Comment:     .  Less than 99th percentile of normal range cutoff-  Female and children under 18 years old <14 ng/L; Male <21 ng/L: Negative  Repeat testing should be performed if clinically indicated.   .  Female and children under 18 years old 14-50 ng/L; Male 21-50 ng/L:  Consistent with possible cardiac damage and possible increased clinical   risk. Serial measurements may help to assess extent of myocardial damage.   .  >50 ng/L: Consistent with cardiac damage, increased clinical risk and  myocardial infarction. Serial measurements may help assess extent of   myocardial damage.   .   NOTE: Children less than 1 year old may have higher baseline troponin   levels and results should be interpreted in conjunction with the overall   clinical context.   .  NOTE: Troponin I testing is performed using a different   testing methodology at [facility] than at other   [facility]. Direct result comparisons should only   be made within the same method.     07/08/2022 08:37 AM 27 0 - 13 ng/L Final     Comment:     .  Less than 99th percentile of normal range cutoff-  Female and children under 18 years old <14 ng/L; Male <21 ng/L: Negative  Repeat testing should be performed if clinically indicated.   .  Female and children under 18 years old 14-50 ng/L; Male 21-50 ng/L:  Consistent with possible cardiac damage and possible increased clinical   risk. Serial measurements may help to assess extent of myocardial damage.   .  >50 ng/L: Consistent with cardiac damage, increased clinical risk and  myocardial infarction. Serial measurements may help assess extent of   myocardial damage.   .   NOTE: Children less than 1 year old may have higher baseline troponin   levels and results should be interpreted in conjunction with the overall   clinical context.   .  NOTE: Troponin I  testing is performed using a different   testing methodology at [facility] than at other   [facility]. Direct result comparisons should only   be made within the same method.       BNP   Date/Time Value Ref Range Status   12/26/2023 10:22  0 - 99 pg/mL Final   08/31/2022 03:06  0 - 99 pg/mL Final     Comment:     .  <100 pg/mL - Heart failure unlikely  100-299 pg/mL - Intermediate probability of acute heart  .               failure exacerbation. Correlate with clinical  .               context and patient history.    >=300 pg/mL - Heart Failure likely. Correlate with clinical  .               context and patient history.  BNP testing is performed using different testing   methodology at [facility] than at other   [facility] hospitals. Direct result comparisons should   only be made within the same method.     07/08/2022 07:25  0 - 99 pg/mL Final     Comment:     .  <100 pg/mL - Heart failure unlikely  100-299 pg/mL - Intermediate probability of acute heart  .               failure exacerbation. Correlate with clinical  .               context and patient history.    >=300 pg/mL - Heart Failure likely. Correlate with clinical  .               context and patient history.  BNP testing is performed using different testing   methodology at [facility] than at other   [facility]. Direct result comparisons should   only be made within the same method.        Last I/O:  I/O last 3 completed shifts:  In: 54.9 (0.5 mL/kg) [P.O.:30; I.V.:24.9 (0.2 mL/kg)]  Out: 675 (6.5 mL/kg) [Urine:675 (0.2 mL/kg/hr)]  Weight: 104.2 kg     Past Cardiology Tests (Last 3 Years):  EKG:  ECG 12 lead 12/27/2023 (Preliminary)      ECG 12 lead 12/26/2023 (Preliminary)    Echo:  Transthoracic Echo (TTE) Complete 12/27/2023  CONCLUSIONS:   1. Left ventricular systolic function is normal with a 65% estimated ejection fraction.   2. Spectral Doppler shows an abnormal pattern of left  "ventricular diastolic filling.   3. The left atrium is severely dilated.   4. The right atrium is severely dilated.   5. Mild aortic valve regurgitation.   6. Severe mitral valve regurgitation.   7. Severe tricuspid regurgitation visualized.   8. The tricuspid valve annulus appears dilated.   9. Moderately elevated right ventricular systolic pressure.    Ejection Fractions:  No results found for: \"EF\"  Cath:  No results found for this or any previous visit from the past 1095 days.    Stress Test:  No results found for this or any previous visit from the past 1095 days.    Cardiac Imaging:  No results found for this or any previous visit from the past 1095 days.      Inpatient Medications:  Scheduled medications   Medication Dose Route Frequency    aspirin  81 mg oral Daily    atorvastatin  80 mg oral Daily    enoxaparin  40 mg subcutaneous q12h STACY    furosemide  40 mg intravenous BID    nystatin  1 Application Topical BID     PRN medications   Medication    acetaminophen    Or    acetaminophen    Or    acetaminophen    atropine    benzocaine-menthol    HYDROmorphone    nitroglycerin    ondansetron    oxygen     Continuous Medications   Medication Dose Last Rate    [Held by provider] DOPamine  1-5 mcg/kg/min Stopped (12/27/23 1030)       Physical Exam:  Physical Exam  Constitutional:       Appearance: She is ill-appearing and diaphoretic.   HENT:      Nose: Nose normal.      Mouth/Throat:      Mouth: Mucous membranes are moist.   Neck:      Comments: +JVD  Cardiovascular:      Rate and Rhythm: Bradycardia present.      Heart sounds: No murmur heard.  Pulmonary:      Comments: Increased work of breathing with scattered crackles.  Musculoskeletal:         General: Swelling present.      Right lower leg: Edema present.      Left lower leg: Edema present.   Neurological:      Mental Status: She is alert. Mental status is at baseline.            Assessment/Plan   Katy Galvin is an 88 y.o. female with history of atrial " "fibrillation (not on anticoagulation d/t GI bleed), and hypertension who was admitted for symptomatic bradycardia.      \"EKG in the ED is Junctional, 45bpm. Atropine was given by EMS, and again in the ED (not states her symptoms improved but unsure if heart rate was changed). Trop 35, 42. Dopamine drip was started by the primary team. She is hypervolemic on exam, +JVD, BLE edema, abdominal fulness. EKG was obtained and she is in a junctional rhythm, 50bpm. On tele she is having a lot of bigeminy, heart rate 40-50, on the Dopamine drip running at 1mcg/kg/min. We turned off her Dopamine drip and gave .5mg of IV Atropine with no improvement in heart rate. No decrease in heart rate with Dopamine off. Her daughter is at bedside and we discussed the need for a pacemaker, the patient is unsure if this is something she would want and would like to think about it. Echocardiogram done today showing a preserved EF, mitral and tricuspid regurgitation.\"    After further discussion with family patient decided against PPM placement and would like to proceed with hospice evaluation/care.      #Symptomatic bradycardia, Junctional Rhythm with long R-R intervals.   #Severe MR, Severe TR  #Acute on chronic decompensated diastolic heart failure  (HFpEF) in the setting of severe MR  #Elevated Troponin, not concerning for ACS     -Echocardiogram reviewed with normal LVSF, EF 65%, abnormal LV diastolic filling, Biatrial dilation, Severe MR, Severe TR, tricuspid annulus dilated, moderately elevated RVSP.  -Dobutamine, Atropine d/c  -Recommended eval for PPM, pt not interested. Transitioning to palliative care w/ consultation to hospice  -Still hypervolemic on exam, continue IV lasix for comfort.   -O2 for comfort  -Will sign off.     Peripheral IV 12/27/23 22 G Distal;Left;Posterior Forearm (Active)   Site Assessment Clean 12/28/23 0747   Dressing Status Dry 12/28/23 0747   Number of days: 1       Code Status:  DNR and No Intubation    I " spent  minutes in the professional and overall care of this patient.        Missy Bee PA-C

## 2023-12-28 NOTE — PROGRESS NOTES
"Katy Galvin is a 88 y.o. female on day 1 of admission presenting with Chest pain.    Subjective   Pt is feeling the same. She does not want any procedures.       Objective     Physical Exam  Vitals and nursing note reviewed.   Constitutional:       General: She is not in acute distress.     Appearance: Normal appearance. She is not ill-appearing or toxic-appearing.   HENT:      Head: Normocephalic and atraumatic.      Mouth/Throat:      Mouth: Mucous membranes are moist.   Eyes:      Extraocular Movements: Extraocular movements intact.      Conjunctiva/sclera: Conjunctivae normal.      Pupils: Pupils are equal, round, and reactive to light.   Cardiovascular:      Rate and Rhythm: Regular rhythm. Bradycardia present.      Heart sounds: No murmur heard.     No gallop.   Pulmonary:      Effort: Pulmonary effort is normal. No respiratory distress.      Breath sounds: Normal breath sounds. No wheezing, rhonchi or rales.   Abdominal:      General: Abdomen is flat. Bowel sounds are normal. There is no distension.      Palpations: Abdomen is soft. There is no mass.      Tenderness: There is no abdominal tenderness.   Musculoskeletal:         General: No swelling or tenderness. Normal range of motion.      Cervical back: Normal range of motion and neck supple.   Skin:     General: Skin is warm and dry.   Neurological:      Mental Status: She is alert and oriented to person, place, and time.      Motor: Weakness present.   Psychiatric:         Mood and Affect: Mood normal.         Behavior: Behavior normal.         Thought Content: Thought content normal.         Judgment: Judgment normal.         Last Recorded Vitals:  /61 (BP Location: Left arm)   Pulse (!) 39   Temp 36.6 °C (97.9 °F) (Temporal)   Resp 14   Ht 1.473 m (4' 10\")   Wt 104 kg (229 lb 11.5 oz)   SpO2 97%   BMI 48.01 kg/m²      Scheduled medications:  aspirin, 81 mg, oral, Daily  atorvastatin, 80 mg, oral, Daily  atropine, 0.5 mg, intravenous, " Once  enoxaparin, 40 mg, subcutaneous, q12h STACY  furosemide, 40 mg, intravenous, BID  nystatin, 1 Application, Topical, BID      Continuous medications:  [Held by provider] DOPamine, 1-5 mcg/kg/min, Last Rate: Stopped (12/27/23 1030)      PRN medications:  PRN medications: acetaminophen **OR** acetaminophen **OR** acetaminophen, atropine, benzocaine-menthol, HYDROmorphone, nitroglycerin, ondansetron, oxygen     Relevant Results:  Results for orders placed or performed during the hospital encounter of 12/26/23 (from the past 24 hour(s))   Comprehensive Metabolic Panel   Result Value Ref Range    Glucose 111 (H) 74 - 99 mg/dL    Sodium 139 136 - 145 mmol/L    Potassium 4.5 3.5 - 5.3 mmol/L    Chloride 105 98 - 107 mmol/L    Bicarbonate 21 21 - 32 mmol/L    Anion Gap 18 10 - 20 mmol/L    Urea Nitrogen 32 (H) 6 - 23 mg/dL    Creatinine 1.51 (H) 0.50 - 1.05 mg/dL    eGFR 33 (L) >60 mL/min/1.73m*2    Calcium 10.0 8.6 - 10.3 mg/dL    Albumin 3.6 3.4 - 5.0 g/dL    Alkaline Phosphatase 31 (L) 33 - 136 U/L    Total Protein 6.3 (L) 6.4 - 8.2 g/dL    AST 22 9 - 39 U/L    Bilirubin, Total 2.4 (H) 0.0 - 1.2 mg/dL    ALT 13 7 - 45 U/L   CBC and Auto Differential   Result Value Ref Range    WBC 9.8 4.4 - 11.3 x10*3/uL    nRBC 0.0 0.0 - 0.0 /100 WBCs    RBC 4.30 4.00 - 5.20 x10*6/uL    Hemoglobin 12.0 12.0 - 16.0 g/dL    Hematocrit 40.0 36.0 - 46.0 %    MCV 93 80 - 100 fL    MCH 27.9 26.0 - 34.0 pg    MCHC 30.0 (L) 32.0 - 36.0 g/dL    RDW 16.6 (H) 11.5 - 14.5 %    Platelets 208 150 - 450 x10*3/uL    Neutrophils % 77.7 40.0 - 80.0 %    Immature Granulocytes %, Automated 0.4 0.0 - 0.9 %    Lymphocytes % 13.4 13.0 - 44.0 %    Monocytes % 6.8 2.0 - 10.0 %    Eosinophils % 0.6 0.0 - 6.0 %    Basophils % 1.1 0.0 - 2.0 %    Neutrophils Absolute 7.63 (H) 1.60 - 5.50 x10*3/uL    Immature Granulocytes Absolute, Automated 0.04 0.00 - 0.50 x10*3/uL    Lymphocytes Absolute 1.32 0.80 - 3.00 x10*3/uL    Monocytes Absolute 0.67 0.05 - 0.80 x10*3/uL     Eosinophils Absolute 0.06 0.00 - 0.40 x10*3/uL    Basophils Absolute 0.11 (H) 0.00 - 0.10 x10*3/uL       ECG 12 lead    Result Date: 12/27/2023  Atrial fibrillation with slow ventricular response with premature ventricular or aberrantly conducted complexes Left axis deviation Abnormal ECG When compared with ECG of 26-DEC-2023 22:11, (unconfirmed) Atrial fibrillation has replaced Junctional rhythm QRS axis Shifted left    ECG 12 lead    Result Date: 12/27/2023  Junctional rhythm Right axis deviation Possible Anterior infarct , age undetermined Abnormal ECG When compared with ECG of 31-AUG-2022 03:53, Previous ECG has undetermined rhythm, needs review QRS axis Shifted right Nonspecific T wave abnormality no longer evident in Lateral leads    XR chest 1 view    Result Date: 12/26/2023  Interpreted By:  Lauren Geiger, STUDY: XR CHEST 1 VIEW;  12/26/2023 10:27 pm   INDICATION: Signs/Symptoms:Chest Pain.   COMPARISON: Radiographs of the chest dated 07/08/2022.   ACCESSION NUMBER(S): [id number]   ORDERING CLINICIAN: SADIE WEBB   FINDINGS: AP radiograph of the chest was provided.   Patient is somewhat rotated to the left.   CARDIOMEDIASTINAL SILHOUETTE: Cardiomediastinal silhouette is enlarged, slightly increased from prior exam in July of 2022, possibly projectional.   LUNGS: Bibasilar atelectasis with pulmonary vascular congestion is present, without evidence of sizable pleural effusion or consolidation. No pneumothorax.   ABDOMEN: No remarkable upper abdominal findings.   BONES: No acute osseous changes.       1.  Cardiomegaly with bibasilar atelectasis and mild pulmonary vascular congestion.       MACRO: None   Signed by: Lauren Geiger 12/26/2023 10:58 PM Dictation workstation:   [id number]            Assessment/Plan   Principal Problem:    Chest pain  Active Problems:    Bradycardia    HFrEF exacerbation  - Cardio following  - IV lasix  - statin/ASA    Bradycardia  - Cardio following: pt  needs pacer  - pt does want any procedures    DVT ppx: SCD  Code status: DNR/DNI  Dispo: monitor clinically, continue diuresis, awaiting hospice arrangements         Helen Spears MD  Hospitalist

## 2023-12-28 NOTE — PROGRESS NOTES
12/28/23 1139   Discharge Planning   Living Arrangements Children   Support Systems Children   Assistance Needed Called daughter Radha to obtain information, daughter states that patient is independent with most ADL's besides bathing. Meeting scheduled with Hospice later this afternoon.   Type of Residence Private residence   Who is requesting discharge planning? Provider   Home or Post Acute Services Other (Comment)  (Pending hospice meeting)   Patient expects to be discharged to: Home- pending hospice meeting   Does the patient need discharge transport arranged? Yes   RoundTrip coordination needed? Yes   Has discharge transport been arranged? No

## 2023-12-28 NOTE — CARE PLAN
The clinical goals for the shift include HR will remain above 35bpm and will sleep/rest.    Problem: Skin  Goal: Prevent/manage excess moisture  Outcome: Progressing  Flowsheets (Taken 12/27/2023 2331)  Prevent/manage excess moisture: Use wicking fabric (obtain order)  Goal: Promote skin healing  Outcome: Progressing  Flowsheets (Taken 12/27/2023 2331)  Promote skin healing: Assess skin/pad under line(s)/device(s)  Problem: Fall/Injury  Goal: Not fall by end of shift  Outcome: Progressing  Goal: Be free from injury by end of the shift  Outcome: Progressing  Goal: Verbalize understanding of personal risk factors for fall in the hospital  Outcome: Progressing  Problem: Pain  Goal: Walks with improved pain control throughout the shift  Outcome: Progressing  Goal: Participates in PT with improved pain control throughout the shift  Outcome: Progressing

## 2023-12-29 VITALS
DIASTOLIC BLOOD PRESSURE: 56 MMHG | RESPIRATION RATE: 12 BRPM | WEIGHT: 230.82 LBS | BODY MASS INDEX: 48.45 KG/M2 | SYSTOLIC BLOOD PRESSURE: 140 MMHG | HEIGHT: 58 IN | OXYGEN SATURATION: 100 % | HEART RATE: 32 BPM | TEMPERATURE: 97.9 F

## 2023-12-29 LAB
ALBUMIN SERPL BCP-MCNC: 3.8 G/DL (ref 3.4–5)
ALP SERPL-CCNC: 33 U/L (ref 33–136)
ALT SERPL W P-5'-P-CCNC: 14 U/L (ref 7–45)
ANION GAP SERPL CALC-SCNC: 17 MMOL/L (ref 10–20)
AST SERPL W P-5'-P-CCNC: 27 U/L (ref 9–39)
BASOPHILS # BLD AUTO: 0.11 X10*3/UL (ref 0–0.1)
BASOPHILS NFR BLD AUTO: 1.1 %
BILIRUB SERPL-MCNC: 2.4 MG/DL (ref 0–1.2)
BUN SERPL-MCNC: 36 MG/DL (ref 6–23)
CALCIUM SERPL-MCNC: 10 MG/DL (ref 8.6–10.3)
CHLORIDE SERPL-SCNC: 102 MMOL/L (ref 98–107)
CO2 SERPL-SCNC: 22 MMOL/L (ref 21–32)
CREAT SERPL-MCNC: 1.58 MG/DL (ref 0.5–1.05)
EOSINOPHIL # BLD AUTO: 0.06 X10*3/UL (ref 0–0.4)
EOSINOPHIL NFR BLD AUTO: 0.6 %
ERYTHROCYTE [DISTWIDTH] IN BLOOD BY AUTOMATED COUNT: 16.7 % (ref 11.5–14.5)
GFR SERPL CREATININE-BSD FRML MDRD: 32 ML/MIN/1.73M*2
GLUCOSE SERPL-MCNC: 76 MG/DL (ref 74–99)
HCT VFR BLD AUTO: 40.1 % (ref 36–46)
HGB BLD-MCNC: 11.9 G/DL (ref 12–16)
IMM GRANULOCYTES # BLD AUTO: 0.04 X10*3/UL (ref 0–0.5)
IMM GRANULOCYTES NFR BLD AUTO: 0.4 % (ref 0–0.9)
LYMPHOCYTES # BLD AUTO: 1.25 X10*3/UL (ref 0.8–3)
LYMPHOCYTES NFR BLD AUTO: 12.3 %
MCH RBC QN AUTO: 27.8 PG (ref 26–34)
MCHC RBC AUTO-ENTMCNC: 29.7 G/DL (ref 32–36)
MCV RBC AUTO: 94 FL (ref 80–100)
MONOCYTES # BLD AUTO: 0.85 X10*3/UL (ref 0.05–0.8)
MONOCYTES NFR BLD AUTO: 8.3 %
NEUTROPHILS # BLD AUTO: 7.88 X10*3/UL (ref 1.6–5.5)
NEUTROPHILS NFR BLD AUTO: 77.3 %
NRBC BLD-RTO: 0 /100 WBCS (ref 0–0)
PLATELET # BLD AUTO: 208 X10*3/UL (ref 150–450)
POTASSIUM SERPL-SCNC: 4.3 MMOL/L (ref 3.5–5.3)
PROT SERPL-MCNC: 6.7 G/DL (ref 6.4–8.2)
RBC # BLD AUTO: 4.28 X10*6/UL (ref 4–5.2)
SODIUM SERPL-SCNC: 137 MMOL/L (ref 136–145)
WBC # BLD AUTO: 10.2 X10*3/UL (ref 4.4–11.3)

## 2023-12-29 PROCEDURE — 80053 COMPREHEN METABOLIC PANEL: CPT | Performed by: STUDENT IN AN ORGANIZED HEALTH CARE EDUCATION/TRAINING PROGRAM

## 2023-12-29 PROCEDURE — 2500000004 HC RX 250 GENERAL PHARMACY W/ HCPCS (ALT 636 FOR OP/ED): Performed by: INTERNAL MEDICINE

## 2023-12-29 PROCEDURE — 99239 HOSP IP/OBS DSCHRG MGMT >30: CPT | Performed by: STUDENT IN AN ORGANIZED HEALTH CARE EDUCATION/TRAINING PROGRAM

## 2023-12-29 PROCEDURE — 2500000004 HC RX 250 GENERAL PHARMACY W/ HCPCS (ALT 636 FOR OP/ED)

## 2023-12-29 PROCEDURE — 36415 COLL VENOUS BLD VENIPUNCTURE: CPT | Performed by: STUDENT IN AN ORGANIZED HEALTH CARE EDUCATION/TRAINING PROGRAM

## 2023-12-29 PROCEDURE — 85025 COMPLETE CBC W/AUTO DIFF WBC: CPT | Performed by: STUDENT IN AN ORGANIZED HEALTH CARE EDUCATION/TRAINING PROGRAM

## 2023-12-29 PROCEDURE — 96372 THER/PROPH/DIAG INJ SC/IM: CPT | Performed by: INTERNAL MEDICINE

## 2023-12-29 PROCEDURE — 2500000001 HC RX 250 WO HCPCS SELF ADMINISTERED DRUGS (ALT 637 FOR MEDICARE OP): Performed by: INTERNAL MEDICINE

## 2023-12-29 RX ORDER — LORAZEPAM 0.5 MG/1
0.5 TABLET ORAL EVERY 6 HOURS PRN
Qty: 30 TABLET | Refills: 3 | Status: SHIPPED | OUTPATIENT
Start: 2023-12-29

## 2023-12-29 RX ORDER — FUROSEMIDE 20 MG/1
20 TABLET ORAL DAILY
Qty: 30 TABLET | Refills: 0 | Status: SHIPPED | OUTPATIENT
Start: 2023-12-29

## 2023-12-29 RX ORDER — NAPROXEN SODIUM 220 MG/1
81 TABLET, FILM COATED ORAL DAILY
Start: 2023-12-29

## 2023-12-29 RX ORDER — ACETAMINOPHEN 325 MG/1
650 TABLET ORAL EVERY 4 HOURS PRN
Qty: 30 TABLET | Refills: 0
Start: 2023-12-29

## 2023-12-29 RX ORDER — BISACODYL 10 MG/1
10 SUPPOSITORY RECTAL DAILY
Qty: 30 SUPPOSITORY | Refills: 0
Start: 2023-12-29 | End: 2024-01-28

## 2023-12-29 RX ORDER — HYDROMORPHONE HYDROCHLORIDE 2 MG/1
2 TABLET ORAL
Qty: 30 TABLET | Refills: 0 | Status: SHIPPED | OUTPATIENT
Start: 2023-12-29 | End: 2024-01-03

## 2023-12-29 RX ORDER — ONDANSETRON 4 MG/1
4 TABLET, FILM COATED ORAL EVERY 6 HOURS PRN
Qty: 30 TABLET | Refills: 0 | Status: SHIPPED | OUTPATIENT
Start: 2023-12-29

## 2023-12-29 RX ADMIN — HYDROMORPHONE HYDROCHLORIDE 0.5 MG: 1 INJECTION, SOLUTION INTRAMUSCULAR; INTRAVENOUS; SUBCUTANEOUS at 00:14

## 2023-12-29 RX ADMIN — ENOXAPARIN SODIUM 40 MG: 40 INJECTION SUBCUTANEOUS at 13:03

## 2023-12-29 RX ADMIN — FUROSEMIDE 40 MG: 10 INJECTION, SOLUTION INTRAMUSCULAR; INTRAVENOUS at 13:04

## 2023-12-29 RX ADMIN — ASPIRIN 81 MG CHEWABLE TABLET 81 MG: 81 TABLET CHEWABLE at 13:03

## 2023-12-29 RX ADMIN — ATORVASTATIN CALCIUM 80 MG: 80 TABLET, FILM COATED ORAL at 13:03

## 2023-12-29 ASSESSMENT — PAIN SCALES - GENERAL
PAINLEVEL_OUTOF10: 0 - NO PAIN
PAINLEVEL_OUTOF10: 8
PAINLEVEL_OUTOF10: 2

## 2023-12-29 ASSESSMENT — PAIN - FUNCTIONAL ASSESSMENT
PAIN_FUNCTIONAL_ASSESSMENT: 0-10
PAIN_FUNCTIONAL_ASSESSMENT: 0-10

## 2023-12-29 ASSESSMENT — PAIN DESCRIPTION - LOCATION: LOCATION: RIB CAGE

## 2023-12-29 ASSESSMENT — PAIN DESCRIPTION - ORIENTATION: ORIENTATION: LEFT

## 2023-12-29 NOTE — NURSING NOTE
RN Hospice Note    Katy Galvin is a Hospice Patient.   Hospice terminal diagnosis: heart disease  Physician: keny thompson   Visit type: discharge    Comments/recommendations: dme and medications ordered and will arrive to house by 2pm today..   community care  set for 4pm.  Discharge reviewed with dtrs mercy and ankur. Pt is confused and not able to sign own consents.  Remove iv before discharge     Discharge Planning:  Patient to be discharged to home    The following is to be completed:  Discharge order: done  State DNR signed by MD: done  Nursing facility referral/transfer form: na  Medication reconciliation: yes  PAS/RR or convalescent stay form: na  Prescriptions for al narcotics/new medications: faxed to [facility]   Transportation: community care 4pm   Other: remove iV before discharge     Plan of care reviewed with patient/family members mercy diaz. Son magno diaz also in agreemetn    Plan of care reviewed with hospital staff members: Ricci ge     Please notify [facility] of any changes in condition. Thank you.  Office: [phone] (8 am-6:30 pm M-F and 8 am-4:30 pm weekends and holidays)   [phone] (6:30 pm-8 am M-F and 4:30 pm-8 am weekends and holidays)    Liz Gregg RN

## 2023-12-29 NOTE — DISCHARGE SUMMARY
Discharge Diagnosis  bradycardia      Discharge Meds     Your medication list        START taking these medications        Instructions Last Dose Given Next Dose Due   acetaminophen 325 mg tablet  Commonly known as: Tylenol      Take 2 tablets (650 mg) by mouth every 4 hours if needed for moderate pain (4 - 6).       aspirin 81 mg chewable tablet      Chew 1 tablet (81 mg) once daily.       bisacodyl 10 mg suppository  Commonly known as: Dulcolax      Insert 1 suppository (10 mg) into the rectum once daily.       furosemide 20 mg tablet  Commonly known as: Lasix      Take 1 tablet (20 mg) by mouth once daily.       HYDROmorphone 2 mg tablet  Commonly known as: Dilaudid      Take 1 tablet (2 mg) by mouth every 3 hours if needed for severe pain (7 - 10) (pain or shortness of breath) for up to 5 days.       LORazepam 0.5 mg tablet  Commonly known as: Ativan      Take 1 tablet (0.5 mg) by mouth every 6 hours if needed for anxiety.       ondansetron 4 mg tablet  Commonly known as: Zofran      Take 1 tablet (4 mg) by mouth every 6 hours if needed for nausea or vomiting.       oxygen gas therapy  Commonly known as: O2      Inhale 1 each once every 24 hours.                 Where to Get Your Medications        You can get these medications from any pharmacy    Bring a paper prescription for each of these medications  furosemide 20 mg tablet  HYDROmorphone 2 mg tablet  LORazepam 0.5 mg tablet  ondansetron 4 mg tablet       Information about where to get these medications is not yet available    Ask your nurse or doctor about these medications  acetaminophen 325 mg tablet  aspirin 81 mg chewable tablet  bisacodyl 10 mg suppository  oxygen gas therapy         Test Results Pending At Discharge  Pending Labs       No current pending labs.            Hospital Course   Katy Galvin is an 88 y.o. female  with history of atrial fibrillation and hypertension presenting with SOB, dizziness and chest pain. She is treated for  HFrEF  exacerbation  - Cardio following  - IV lasix, change to PO lasix  - statin/ASA     Bradycardia  - Cardio following: pt needs pacer  - pt does want any procedures, palliative was consulted. After family meeting, pt/family opted for hospice.     Pt is hemodynamically stable for discharge at this time to home with home hospice.     The patient was discharged in satisfactory condition.   Medications and side effect profile reviewed with patient.  More than 30 minutes were spent in coordinating patient discharge       Pertinent Physical Exam At Time of Discharge  Physical Exam  Vitals and nursing note reviewed.   Constitutional:       General: She is not in acute distress.     Appearance: Normal appearance. She is not ill-appearing or toxic-appearing.   HENT:      Head: Normocephalic and atraumatic.      Mouth/Throat:      Mouth: Mucous membranes are moist.   Eyes:      Extraocular Movements: Extraocular movements intact.      Conjunctiva/sclera: Conjunctivae normal.      Pupils: Pupils are equal, round, and reactive to light.   Cardiovascular:      Rate and Rhythm: Regular rhythm. Bradycardia present.      Heart sounds: No murmur heard.     No gallop.   Pulmonary:      Effort: Pulmonary effort is normal. No respiratory distress.      Breath sounds: Normal breath sounds. No wheezing, rhonchi or rales.   Abdominal:      General: Abdomen is flat. Bowel sounds are normal. There is no distension.      Palpations: Abdomen is soft. There is no mass.      Tenderness: There is no abdominal tenderness.   Musculoskeletal:         General: No swelling or tenderness. Normal range of motion.      Cervical back: Normal range of motion and neck supple.   Skin:     General: Skin is warm and dry.   Neurological:      Mental Status: Aox2, confusion apparent      Motor: Weakness present.     Outpatient Follow-Up  No future appointments.      Helen Spears MD  Hospitalist

## 2023-12-29 NOTE — NURSING NOTE
RN Hospice Note    Katy Saez  has not agreed to hospice. INFORMATIONAL MEETING ONLY  Hospice terminal diagnosis: HEART DISEASE  Physician: TREY MATUTE, MICH ESPOSITO   Visit type: INFORMATIONAL MEETING     Comments/recommendations: MET with pt, her dtr Rupesh pascual with son Billy on phone. Pt stated very clearly that she didn't want a pacemaker but unable to move forward with decision. Family all encouraging hospice. Pt ox3 during meeting however pt confused now one hour later. I do not feel pt is a capable decision maker at this time. Family left bedside but willing to return at 830 am to have additional meeting.     Discharge Planning:  Patient to be discharged to home    The following is to be completed:  Discharge order: d   State DNR signed by MD: d  Nursing facility referral/transfer form: na  Medication reconciliation: na  PAS/RR or convalescent stay form: na  Prescriptions for al narcotics/new medications: na  Transportation: na  Other: informational meeting only    Plan of care reviewed with patient/family members ankur saez,   Plan of care reviewed with hospital staff members: leatha ge     Please notify [facility] of any changes in condition. Thank you.  Office: [phone] (8 am-6:30 pm M-F and 8 am-4:30 pm weekends and holidays)   [phone] (6:30 pm-8 am M-F and 4:30 pm-8 am weekends and holidays)    Liz Gregg RN

## 2023-12-29 NOTE — PROGRESS NOTES
12/29/23 1139   Discharge Planning   Living Arrangements Children   Support Systems Children   Assistance Needed Patient is from home with daughter, A&Ox2-3 at baseline, independent with ADL's with the exception of bathing at baseline. Will discharge home with hospice, DME and medications ordered by [facility] and to be delivered to patients home.   Type of Residence Private residence   Number of Stairs to Enter Residence 0   Number of Stairs Within Residence 0   Do you have animals or pets at home? No   Who is requesting discharge planning? Patient   Home or Post Acute Services   (Home with hospice)   Type of Home Care Services Hospice   Patient expects to be discharged to: Home with hospice   Does the patient need discharge transport arranged? Yes   RoundTrip coordination needed? Yes   Has discharge transport been arranged? Yes     12/29/23 at 1149: Patient to be discharged home with hospice. Verified that all arrangements were made by [facility] for DME, medications, and transport. [facility]CC to follow.

## 2023-12-31 LAB
ATRIAL RATE: 227 BPM
Q ONSET: 225 MS
QRS COUNT: 8 BEATS
QRS DURATION: 86 MS
QT INTERVAL: 442 MS
QTC CALCULATION(BAZETT): 419 MS
QTC FREDERICIA: 426 MS
R AXIS: -39 DEGREES
T AXIS: 11 DEGREES
T OFFSET: 446 MS
VENTRICULAR RATE: 54 BPM

## 2024-01-03 NOTE — SIGNIFICANT EVENT
Follow Up Phone Call    Outgoing phone call    Spoke to: Katy Galvin Relationship:familydaughter   Phone number: [phone]      Outcome: contacted patient/ family   Chief Complaint   Patient presents with    Chest Pain     Pt having chest pain all day. Pt's heart rate in the 30, atropine given in squad.          Diagnosis:Not applicable    Active with hospice homecare.

## 2024-01-10 LAB
ATRIAL RATE: 48 BPM
Q ONSET: 224 MS
QRS COUNT: 7 BEATS
QRS DURATION: 78 MS
QT INTERVAL: 434 MS
QTC CALCULATION(BAZETT): 375 MS
QTC FREDERICIA: 394 MS
R AXIS: 115 DEGREES
T AXIS: 50 DEGREES
T OFFSET: 441 MS
VENTRICULAR RATE: 45 BPM

## (undated) DEVICE — YANKAUER,BULB TIP,W/O VENT,RIGID,STERILE: Brand: MEDLINE

## (undated) DEVICE — 6 X 9  1.75MIL 4-WALL LABGUARD: Brand: MINIGRIP COMMERCIAL LLC

## (undated) DEVICE — CONTROL SYRINGE LUER-LOCK TIP: Brand: MONOJECT

## (undated) DEVICE — CHLORAPREP 26ML ORANGE

## (undated) DEVICE — CONTAINER SPEC COLL 960ML POLYPR TRIANG GRAD INTAKE/OUTPUT

## (undated) DEVICE — BLADE ES ELASTOMERIC COAT INSUL DURABLE BEND UPTO 90DEG

## (undated) DEVICE — PATIENT RETURN ELECTRODE, SINGLE-USE, CONTACT QUALITY MONITORING, ADULT, WITH 9FT CORD, FOR PATIENTS WEIGING OVER 33LBS. (15KG): Brand: MEGADYNE

## (undated) DEVICE — SPONGE GZ 4IN 4IN 4 PLY N WVN AVANT

## (undated) DEVICE — BINDER ABD SM MED 30 IN - 45 IN HT 12 IN

## (undated) DEVICE — KENDALL 450 SERIES MONITORING FOAM ELECTRODE - RECTANGULAR SHAPE ( 3/PK): Brand: KENDALL

## (undated) DEVICE — MARKER,SKIN,WI/RULER AND LABELS: Brand: MEDLINE

## (undated) DEVICE — SOLUTION IV IRRIG 500ML 0.9% SODIUM CHL 2F7123

## (undated) DEVICE — COVER,LIGHT HANDLE,FLX,1/PK: Brand: MEDLINE INDUSTRIES, INC.

## (undated) DEVICE — TUBING, SUCTION, 1/4" X 10', STRAIGHT: Brand: MEDLINE

## (undated) DEVICE — TOWEL,OR,DSP,ST,BLUE,STD,6/PK,12PK/CS: Brand: MEDLINE

## (undated) DEVICE — GOWN,SIRUS,NONRNF,SETINSLV,XL,20/CS: Brand: MEDLINE

## (undated) DEVICE — ELECTRODE NDL L2.8IN COAT LO PWR SET EDGE

## (undated) DEVICE — Device

## (undated) DEVICE — GLOVE ORANGE PI 7 1/2   MSG9075

## (undated) DEVICE — FORCEPS BX L240CM JAW DIA2.8MM L CAP W/ NDL MIC MESH TOOTH

## (undated) DEVICE — GOWN ISOLATN REG YEL M WT MULTIPLY SIDETIE LEV 2

## (undated) DEVICE — PACK,LAPAROTOMY,NO GOWNS: Brand: MEDLINE

## (undated) DEVICE — GOWN,SIRUS,FABRNF,XL,20/CS: Brand: MEDLINE

## (undated) DEVICE — MEDI-VAC NON-CONDUCTIVE SUCTION TUBING: Brand: CARDINAL HEALTH

## (undated) DEVICE — PAD,ABDOMINAL,5"X9",ST,LF,25/BX: Brand: MEDLINE INDUSTRIES, INC.

## (undated) DEVICE — Device: Brand: DEFENDO VALVE AND CONNECTOR KIT

## (undated) DEVICE — ELECTRODE PT RET AD L9FT HI MOIST COND ADH HYDRGEL CORDED

## (undated) DEVICE — STANDARD HYPODERMIC NEEDLE,POLYPROPYLENE HUB: Brand: MONOJECT

## (undated) DEVICE — MEDI-VAC YANKAUER SUCTION HANDLE W/BULBOUS TIP: Brand: CARDINAL HEALTH

## (undated) DEVICE — ELECTROSURGICAL PENCIL BUTTON SWITCH E-Z CLEAN COATED BLADE ELECTRODE 10 FT (3 M) CORD HOLSTER: Brand: MEGADYNE

## (undated) DEVICE — NDL CNTR 40CT FM MAG: Brand: MEDLINE INDUSTRIES, INC.

## (undated) DEVICE — LUBRICANT SURG JELLY ST BACTER TUBE 4.25OZ

## (undated) DEVICE — GAUZE,SPONGE,4"X4",16PLY,STRL,LF,10/TRAY: Brand: MEDLINE

## (undated) DEVICE — DOUBLE BASIN SET: Brand: MEDLINE INDUSTRIES, INC.

## (undated) DEVICE — SET SURG INSTR DISSECT

## (undated) DEVICE — GAUZE,SPONGE,4"X4",16PLY,XRAY,STRL,LF: Brand: MEDLINE

## (undated) DEVICE — INTENDED FOR TISSUE SEPARATION, AND OTHER PROCEDURES THAT REQUIRE A SHARP SURGICAL BLADE TO PUNCTURE OR CUT.: Brand: BARD-PARKER ® STAINLESS STEEL BLADES

## (undated) DEVICE — MASK,FACE,MAXFLUIDPROTECT,SHIELD/ERLPS: Brand: MEDLINE

## (undated) DEVICE — KIT BEDSIDE REVITAL OX 500ML

## (undated) DEVICE — BLOCK BITE 60FR CAREGUARD

## (undated) DEVICE — SYRINGE IRRIG 60ML SFT PLIABLE BLB EZ TO GRP 1 HND USE W/